# Patient Record
Sex: MALE | Race: WHITE | URBAN - METROPOLITAN AREA
[De-identification: names, ages, dates, MRNs, and addresses within clinical notes are randomized per-mention and may not be internally consistent; named-entity substitution may affect disease eponyms.]

---

## 2018-06-08 PROBLEM — L40.8 INVERSE PSORIASIS: Status: ACTIVE | Noted: 2018-06-08

## 2018-12-03 ENCOUNTER — OFFICE VISIT (OUTPATIENT)
Dept: PRIMARY CARE CLINIC | Age: 68
End: 2018-12-03
Payer: COMMERCIAL

## 2018-12-03 VITALS
HEART RATE: 99 BPM | OXYGEN SATURATION: 96 % | SYSTOLIC BLOOD PRESSURE: 138 MMHG | BODY MASS INDEX: 29.15 KG/M2 | DIASTOLIC BLOOD PRESSURE: 62 MMHG | WEIGHT: 180.6 LBS

## 2018-12-03 DIAGNOSIS — E11.9 TYPE 2 DIABETES MELLITUS WITHOUT COMPLICATION, WITHOUT LONG-TERM CURRENT USE OF INSULIN (HCC): ICD-10-CM

## 2018-12-03 DIAGNOSIS — E11.9 TYPE 2 DIABETES MELLITUS WITHOUT COMPLICATION, WITHOUT LONG-TERM CURRENT USE OF INSULIN (HCC): Primary | ICD-10-CM

## 2018-12-03 DIAGNOSIS — E78.49 OTHER HYPERLIPIDEMIA: ICD-10-CM

## 2018-12-03 DIAGNOSIS — M17.10 ARTHRITIS OF KNEE: Primary | ICD-10-CM

## 2018-12-03 DIAGNOSIS — R19.5 POSITIVE FIT (FECAL IMMUNOCHEMICAL TEST): ICD-10-CM

## 2018-12-03 DIAGNOSIS — I10 ESSENTIAL HYPERTENSION: ICD-10-CM

## 2018-12-03 LAB
CREATININE URINE POCT: 100
HBA1C MFR BLD: 6.3 %
MICROALBUMIN/CREAT 24H UR: 10 MG/G{CREAT}
MICROALBUMIN/CREAT UR-RTO: <30

## 2018-12-03 PROCEDURE — 82044 UR ALBUMIN SEMIQUANTITATIVE: CPT | Performed by: FAMILY MEDICINE

## 2018-12-03 PROCEDURE — 83037 HB GLYCOSYLATED A1C HOME DEV: CPT | Performed by: FAMILY MEDICINE

## 2018-12-03 PROCEDURE — 99214 OFFICE O/P EST MOD 30 MIN: CPT | Performed by: FAMILY MEDICINE

## 2018-12-03 PROCEDURE — 36416 COLLJ CAPILLARY BLOOD SPEC: CPT | Performed by: FAMILY MEDICINE

## 2018-12-03 RX ORDER — METFORMIN HYDROCHLORIDE 500 MG/1
500 TABLET, EXTENDED RELEASE ORAL DAILY
Qty: 90 TABLET | Refills: 2 | Status: SHIPPED | OUTPATIENT
Start: 2018-12-03 | End: 2019-09-24 | Stop reason: SDUPTHER

## 2018-12-03 RX ORDER — NAPROXEN 500 MG/1
500 TABLET ORAL 2 TIMES DAILY PRN
Qty: 180 TABLET | Refills: 2 | Status: SHIPPED | OUTPATIENT
Start: 2018-12-03 | End: 2021-07-12 | Stop reason: SDUPTHER

## 2018-12-03 RX ORDER — SIMVASTATIN 40 MG
TABLET ORAL
Qty: 90 TABLET | Refills: 2 | Status: SHIPPED | OUTPATIENT
Start: 2018-12-03 | End: 2019-10-09 | Stop reason: SDUPTHER

## 2018-12-03 ASSESSMENT — ENCOUNTER SYMPTOMS: RESPIRATORY NEGATIVE: 1

## 2018-12-03 NOTE — PATIENT INSTRUCTIONS
shingles. Shingles vaccine (recombinant)  Recombinant shingles vaccine was approved by FDA in 2017 for the prevention of shingles. In clinical trials, it was more than 90% effective in preventing shingles. It can also reduce the likelihood of PHN. Two doses, 2 to 6 months apart, are recommended for adults 48 and older. This vaccine is also recommended for people who have already gotten the live shingles vaccine (Zostavax). There is no live virus in this vaccine. Some people should not get this vaccine  Tell your vaccine provider if you:  · Have any severe, life-threatening allergies. A person who has ever had a life-threatening allergic reaction after a dose of recombinant shingles vaccine, or has a severe allergy to any component of this vaccine, may be advised not to be vaccinated. Ask your health care provider if you want information about vaccine components. · Are pregnant or breastfeeding. There is not much information about use of recombinant shingles vaccine in pregnant or nursing women. Your healthcare provider might recommend delaying vaccination. · Are not feeling well. If you have a mild illness, such as a cold, you can probably get the vaccine today. If you are moderately or severely ill, you should probably wait until you recover. Your doctor can advise you. Risks of a vaccine reaction  With any medicine, including vaccines, there is a chance of reactions. After recombinant shingles vaccination, a person might experience:  · Pain, redness, soreness, or swelling at the site of the injection  · Headache, muscle aches, fever, shivering, fatigue  In clinical trials, most people got a sore arm with mild or moderate pain after vaccination, and some also had redness and swelling where they got the shot. Some people felt tired, had muscle pain, a headache, shivering, fever, stomach pain, or nausea.  About 1 out of 6 people who got recombinant zoster vaccine experienced side effects that prevented them from doing regular activities. Symptoms went away on their own in about 2 to 3 days. Side effects were more common in younger people. You should still get the second dose of recombinant zoster vaccine even if you had one of these reactions after the first dose. Other things that could happen after this vaccine:  · People sometimes faint after medical procedures, including vaccination. Sitting or lying down for about 15 minutes can help prevent fainting and injuries caused by a fall. Tell your provider if you feel dizzy or have vision changes or ringing in the ears. · Some people get shoulder pain that can be more severe and longer-lasting than routine soreness that can follow injections. This happens very rarely. · Any medication can cause a severe allergic reaction. Such reactions to a vaccine are estimated at about 1 in a million doses, and would happen within a few minutes to a few hours after the vaccination. As with any medicine, there is a very remote chance of a vaccine causing a serious injury or death. The safety of vaccines is always being monitored. For more information, visit: www.cdc.gov/vaccinesafety/  What if there is a serious problem? What should I look for? · Look for anything that concerns you, such as signs of a severe allergic reaction, very high fever, or unusual behavior. Signs of a severe allergic reaction can include hives, swelling of the face and throat, difficulty breathing, a fast heartbeat, dizziness, and weakness. These would usually start a few minutes to a few hours after the vaccination. What should I do? · If you think it is a severe allergic reaction or other emergency that can't wait, call 9-1-1 or get to the nearest hospital. Otherwise, call your health care provider. · Afterward, the reaction should be reported to the Vaccine Adverse Event Reporting System (VAERS).  Your doctor should file this report, or you can do it yourself through the VAERS website at

## 2018-12-03 NOTE — PROGRESS NOTES
[address] [place] [address] PRIMARY CARE  [id number] [facility]  Dept: [phone]  Dept Fax: [phone]    Halley Woodruff is a 80 y.o. male who presents today for his medical conditions/complaintsas noted below. Halley Woodruff is c/o of   Chief Complaint   Patient presents with    Diabetes     Last a1c 7/30/18 6.1, microalbumin 12/13/17, lipid 9/1/18. No vision changes/neuropathy         HPI:     Diabetes   He presents for his follow-up diabetic visit. He has type 2 diabetes mellitus. His disease course has been stable. There are no hypoglycemic associated symptoms. There are no diabetic associated symptoms. There are no hypoglycemic complications. There are no diabetic complications. Risk factors for coronary artery disease include diabetes mellitus and male sex. Current diabetic treatment includes oral agent (monotherapy). He is compliant with treatment all of the time. His weight is stable. He is following a generally healthy diet. He never participates in exercise. There is no change in his home blood glucose trend. His breakfast blood glucose range is generally 130-140 mg/dl. (130-150) An ACE inhibitor/angiotensin II receptor blocker is being taken.        Hemoglobin A1C (%)   Date Value   07/30/2018 6.1   03/29/2018 6.1   12/13/2017 7.0             ( goal A1C is < 7)   No results found for: LABMICR  LDL Calculated (mg/dL)   Date Value   09/01/2018 89   12/05/2017 81   12/03/2016 98       (goal LDL is <100)   No results found for: AST, ALT, BUN  BP Readings from Last 3 Encounters:   12/03/18 138/62   07/30/18 126/62   06/08/18 (!) 150/76          (goal 140/90)    Past Medical History:   Diagnosis Date    H/O hepatitis         Social History   Substance Use Topics    Smoking status: Former Smoker     Packs/day: 0.10     Years: 1.00     Quit date: 1/1/1981    Smokeless tobacco: Never Used    Alcohol use No      Current Outpatient Prescriptions   Medication Sig

## 2019-03-06 DIAGNOSIS — E11.9 TYPE 2 DIABETES MELLITUS WITHOUT COMPLICATION (HCC): ICD-10-CM

## 2019-03-08 RX ORDER — GLUCOSAM/CHON-MSM1/C/MANG/BOSW 500-416.6
TABLET ORAL
Qty: 100 EACH | Refills: 3 | Status: SHIPPED | OUTPATIENT
Start: 2019-03-08 | End: 2020-04-06

## 2019-04-03 ENCOUNTER — OFFICE VISIT (OUTPATIENT)
Dept: PRIMARY CARE CLINIC | Age: 69
End: 2019-04-03
Payer: COMMERCIAL

## 2019-04-03 VITALS
DIASTOLIC BLOOD PRESSURE: 80 MMHG | WEIGHT: 182.2 LBS | HEART RATE: 94 BPM | SYSTOLIC BLOOD PRESSURE: 160 MMHG | BODY MASS INDEX: 29.28 KG/M2 | HEIGHT: 66 IN | OXYGEN SATURATION: 96 %

## 2019-04-03 DIAGNOSIS — E11.9 TYPE 2 DIABETES MELLITUS WITHOUT COMPLICATION, WITHOUT LONG-TERM CURRENT USE OF INSULIN (HCC): ICD-10-CM

## 2019-04-03 DIAGNOSIS — E78.49 OTHER HYPERLIPIDEMIA: ICD-10-CM

## 2019-04-03 DIAGNOSIS — I10 ESSENTIAL HYPERTENSION: ICD-10-CM

## 2019-04-03 LAB — HBA1C MFR BLD: 6.9 %

## 2019-04-03 PROCEDURE — 99214 OFFICE O/P EST MOD 30 MIN: CPT | Performed by: FAMILY MEDICINE

## 2019-04-03 PROCEDURE — 83036 HEMOGLOBIN GLYCOSYLATED A1C: CPT | Performed by: FAMILY MEDICINE

## 2019-04-03 RX ORDER — BENAZEPRIL HYDROCHLORIDE 20 MG/1
20 TABLET ORAL DAILY
Qty: 30 TABLET | Refills: 3 | Status: SHIPPED | OUTPATIENT
Start: 2019-04-03 | End: 2019-07-03 | Stop reason: SDUPTHER

## 2019-04-03 ASSESSMENT — ENCOUNTER SYMPTOMS: RESPIRATORY NEGATIVE: 1

## 2019-04-03 NOTE — PROGRESS NOTES
Outpatient Medications   Medication Sig Dispense Refill    benazepril (LOTENSIN) 20 MG tablet Take 1 tablet by mouth daily 30 tablet 3    TRUEPLUS LANCETS 30G MISC USE AS DIRECTED TO  TEST  DAILY 100 each 3    metFORMIN (GLUCOPHAGE-XR) 500 MG extended release tablet Take 1 tablet by mouth daily 90 tablet 2    simvastatin (ZOCOR) 40 MG tablet TAKE ONE TABLET BY MOUTH NIGHTLY 90 tablet 2    naproxen (NAPROSYN) 500 MG tablet Take 1 tablet by mouth 2 times daily as needed for Pain 180 tablet 2    TRUE METRIX BLOOD GLUCOSE TEST strip USE  STRIP TO CHECK GLUCOSE ONCE DAILY 50 strip 5    amLODIPine-benazepril (LOTREL) 10-20 MG per capsule TAKE ONE CAPSULE BY MOUTH ONCE DAILY 90 capsule 3    Multiple Vitamin (MULTI VITAMIN DAILY) TABS Take 1 tablet by mouth daily       No current facility-administered medications for this visit. Allergies   Allergen Reactions    Cialis [Tadalafil]      Leg pain/cramps    Ranitidine      Leg spasms       Health Maintenance   Topic Date Due    AAA screen  1950    DTaP/Tdap/Td vaccine (1 - Tdap) 01/01/1970    Shingles Vaccine (1 of 2) 01/01/2001    Diabetic retinal exam  04/05/2018    Diabetic foot exam  03/29/2019    Colon Cancer Screen FIT/FOBT  08/15/2019    Lipid screen  09/01/2019    Potassium monitoring  09/01/2019    Creatinine monitoring  09/01/2019    A1C test (Diabetic or Prediabetic)  12/03/2019    Flu vaccine  Completed    Pneumococcal 65+ years Vaccine  Completed    Hepatitis C screen  Completed       Subjective:     Review of Systems   Respiratory: Negative. Cardiovascular: Negative. Gastrointestinal:        Uses metamucil fiber in am for bowels   works part time. Had colonoscopy.      Objective:     BP (!) 160/80 (Site: Left Upper Arm, Position: Sitting, Cuff Size: Medium Adult)   Pulse 94   Ht 5' 5.5\" (1.664 m)   Wt 182 lb 3.2 oz (82.6 kg)   SpO2 96%   BMI 29.86 kg/m²   Physical Exam   Constitutional: He is oriented to person,

## 2019-04-03 NOTE — PATIENT INSTRUCTIONS
Patient Education        Tdap (Tetanus, Diphtheria, Pertussis) Vaccine: What You Need to Know  Why get vaccinated? Tetanus, diphtheria, and pertussis are very serious diseases. Tdap vaccine can protect us from these diseases. And Tdap vaccine given to pregnant women can protect  babies against pertussis. Tetanus (lockjaw) is rare in the [facility] today. It causes painful muscle tightening and stiffness, usually all over the body. · It can lead to tightening of muscles in the head and neck so you can't open your mouth, swallow, or sometimes even breathe. Tetanus kills about 1 out of 10 people who are infected even after receiving the best medical care. Diphtheria is also rare in the [place] today. It can cause a thick coating to form in the back of the throat. · It can lead to breathing problems, heart failure, paralysis, and death. Pertussis (whooping cough) causes severe coughing spells, which can cause difficulty breathing, vomiting, and disturbed sleep. · It can also lead to weight loss, incontinence, and rib fractures. Up to 2 in 100 adolescents and 5 in 100 adults with pertussis are hospitalized or have complications, which could include pneumonia or death. These diseases are caused by bacteria. Diphtheria and pertussis are spread from person to person through secretions from coughing or sneezing. Tetanus enters the body through cuts, scratches, or wounds. Before vaccines, as many as 200,000 cases of diphtheria, 200,000 cases of pertussis, and hundreds of cases of tetanus were reported in the [place] each year. Since vaccination began, reports of cases for tetanus and diphtheria have dropped by about 99% and for pertussis by about 80%. Tdap vaccine  The Tdap vaccine can protect adolescents and adults from tetanus, diphtheria, and pertussis. One dose of Tdap is routinely given at age 6 or 15. People who did not get Tdap at that age should get it as soon as possible.   Tdap is especially important for health care professionals and anyone having close contact with a baby younger than 12 months. Pregnant women should get a dose of Tdap during every pregnancy, to protect the  from pertussis. Infants are most at risk for severe, life-threatening complications from pertussis. Another vaccine, called Td, protects against tetanus and diphtheria, but not pertussis. A Td booster should be given every 10 years. Tdap may be given as one of these boosters if you have never gotten Tdap before. Tdap may also be given after a severe cut or burn to prevent tetanus infection. Your doctor or the person giving you the vaccine can give you more information. Tdap may safely be given at the same time as other vaccines. Some people should not get this vaccine  · A person who has ever had a life-threatening allergic reaction after a previous dose of any diphtheria-, tetanus-, or pertussis-containing vaccine, OR has a severe allergy to any part of this vaccine, should not get Tdap vaccine. Tell the person giving the vaccine about any severe allergies. · Anyone who had coma or long repeated seizures within 7 days after a childhood dose of DTP or DTaP, or a previous dose of Tdap, should not get Tdap, unless a cause other than the vaccine was found. They can still get Td. · Talk to your doctor if you:  ? Have seizures or another nervous system problem. ? Had severe pain or swelling after any vaccine containing diphtheria, tetanus, or pertussis. ? Ever had a condition called Guillain-Barré Syndrome (GBS). ? Aren't feeling well on the day the shot is scheduled. Risks  With any medicine, including vaccines, there is a chance of side effects. These are usually mild and go away on their own. Serious reactions are also possible but are rare. Most people who get Tdap vaccine do not have any problems with it.   Mild problems following Tdap  (Did not interfere with activities)  · Pain where the shot was given (about 3 in 4 adolescents or 2 in 3 adults)  · Redness or swelling where the shot was given (about 1 person in 5)  · Mild fever of at least 100.4°F (up to about 1 in 25 adolescents or 1 in 100 adults)  · Headache (about 3 or 4 people in 10)  · Tiredness (about 1 person in 3 or 4)  · Nausea, vomiting, diarrhea, stomachache (up to 1 in 4 adolescents or 1 in 10 adults)  · Chills, sore joints (about 1 person in 10)  · Body aches (about 1 person in 3 or 4)  · Rash, swollen glands (uncommon)  Moderate problems following Tdap  (Interfered with activities, but did not require medical attention)  · Pain where the shot was given (up to 1 in 5 or 6)  · Redness or swelling where the shot was given (up to about 1 in 16 adolescents or 1 in 12 adults)  · Fever over 102°F (about 1 in 100 adolescents or 1 in 250 adults)  · Headache (about 1 in 7 adolescents or 1 in 10 adults)  · Nausea, vomiting, diarrhea, stomachache (up to 1 to 3 people in 100)  · Swelling of the entire arm where the shot was given (up to about 1 in 500)  Severe problems following Tdap  (Unable to perform usual activities; required medical attention)  · Swelling, severe pain, bleeding and redness in the arm where the shot was given (rare)  Problems that could happen after any vaccine:  · People sometimes faint after a medical procedure, including vaccination. Sitting or lying down for about 15 minutes can help prevent fainting, and injuries caused by a fall. Tell your doctor if you feel dizzy or have vision changes or ringing in the ears. · Some people get severe pain in the shoulder and have difficulty moving the arm where a shot was given. This happens very rarely. · Any medication can cause a severe allergic reaction. Such reactions from a vaccine are very rare, estimated at fewer than 1 in a million doses, and would happen within a few minutes to a few hours after the vaccination.   As with any medicine, there is a very remote chance of a vaccine Human Services  Centers for Disease Control and Prevention  Many Vaccine Information Statements are available in [place] and other languages. See www.immunize.org/vis. Muchas hojas de información sobre vacunas están disponibles en español y en otros idiomas. Visite www.immunize.org/vis. Care instructions adapted under license by [facility] ([facility]). If you have questions about a medical condition or this instruction, always ask your healthcare professional. [place] [address] any warranty or liability for your use of this information. Patient Education        Recombinant Zoster (Shingles) Vaccine, RZV: What you need to know  Why get vaccinated? Shingles (also called herpes zoster, or just zoster) is a painful skin rash, often with blisters. Shingles is caused by the varicella zoster virus, the same virus that causes chickenpox. After you have chickenpox, the virus stays in your body and can cause shingles later in life. You can't catch shingles from another person. However, a person who has never had chickenpox (or chickenpox vaccine) could get chickenpox from someone with shingles. A shingles rash usually appears on one side of the face or body and heals within 2 to 4 weeks. Its main symptom is pain, which can be severe. Other symptoms can include fever, headache, chills, and upset stomach. Very rarely, a shingles infection can lead to pneumonia, hearing problems, blindness, brain inflammation (encephalitis), or death. For about 1 person in 5, severe pain can continue even long after the rash has cleared up. This long-lasting pain is called post-herpetic neuralgia (PHN). Shingles is far more common in people 48years of age and older than in younger people, and the risk increases with age. It is also more common in people whose immune system is weakened because of a disease such as cancer, or by drugs such as steroids or chemotherapy.  At least 1 million people a year in the [place] get shingles. Shingles vaccine (recombinant)  Recombinant shingles vaccine was approved by FDA in 2017 for the prevention of shingles. In clinical trials, it was more than 90% effective in preventing shingles. It can also reduce the likelihood of PHN. Two doses, 2 to 6 months apart, are recommended for adults 48 and older. This vaccine is also recommended for people who have already gotten the live shingles vaccine (Zostavax). There is no live virus in this vaccine. Some people should not get this vaccine  Tell your vaccine provider if you:  · Have any severe, life-threatening allergies. A person who has ever had a life-threatening allergic reaction after a dose of recombinant shingles vaccine, or has a severe allergy to any component of this vaccine, may be advised not to be vaccinated. Ask your health care provider if you want information about vaccine components. · Are pregnant or breastfeeding. There is not much information about use of recombinant shingles vaccine in pregnant or nursing women. Your healthcare provider might recommend delaying vaccination. · Are not feeling well. If you have a mild illness, such as a cold, you can probably get the vaccine today. If you are moderately or severely ill, you should probably wait until you recover. Your doctor can advise you. Risks of a vaccine reaction  With any medicine, including vaccines, there is a chance of reactions. After recombinant shingles vaccination, a person might experience:  · Pain, redness, soreness, or swelling at the site of the injection  · Headache, muscle aches, fever, shivering, fatigue  In clinical trials, most people got a sore arm with mild or moderate pain after vaccination, and some also had redness and swelling where they got the shot. Some people felt tired, had muscle pain, a headache, shivering, fever, stomach pain, or nausea.  About 1 out of 6 people who got recombinant zoster vaccine experienced side effects that prevented them from doing regular activities. Symptoms went away on their own in about 2 to 3 days. Side effects were more common in younger people. You should still get the second dose of recombinant zoster vaccine even if you had one of these reactions after the first dose. Other things that could happen after this vaccine:  · People sometimes faint after medical procedures, including vaccination. Sitting or lying down for about 15 minutes can help prevent fainting and injuries caused by a fall. Tell your provider if you feel dizzy or have vision changes or ringing in the ears. · Some people get shoulder pain that can be more severe and longer-lasting than routine soreness that can follow injections. This happens very rarely. · Any medication can cause a severe allergic reaction. Such reactions to a vaccine are estimated at about 1 in a million doses, and would happen within a few minutes to a few hours after the vaccination. As with any medicine, there is a very remote chance of a vaccine causing a serious injury or death. The safety of vaccines is always being monitored. For more information, visit: www.cdc.gov/vaccinesafety/  What if there is a serious problem? What should I look for? · Look for anything that concerns you, such as signs of a severe allergic reaction, very high fever, or unusual behavior. Signs of a severe allergic reaction can include hives, swelling of the face and throat, difficulty breathing, a fast heartbeat, dizziness, and weakness. These would usually start a few minutes to a few hours after the vaccination. What should I do? · If you think it is a severe allergic reaction or other emergency that can't wait, call 9-1-1 or get to the nearest hospital. Otherwise, call your health care provider. · Afterward, the reaction should be reported to the Vaccine Adverse Event Reporting System (VAERS).  Your doctor should file this report, or you can do it yourself through the VAERS website at www.[employer]. [facility].gov, or by calling 6-453.747.6271. [employer] does not give medical advice. .  How can I learn more? · Ask your health care provider. He or she can give you the vaccine package insert or suggest other sources of information. · Call your local or state health department. · Contact the Centers for Disease Control and Prevention (CDC):  ? Call 2-976.324.5814 (1-800-CDC-INFO) or  ? Visit CDC's website at www.cdc.gov/vaccines  Vaccine Information Statement (Interim)  Recombinant Zoster Vaccine  2/12/2018  [facility] for Disease Control and Prevention  Many Vaccine Information Statements are available in French and other languages. See www.immunize.org/vis. Hojas de Información Sobre Vacunas están disponibles en Español y en muchos otros idiomas. Visite Mad[place].si   Care instructions adapted under license by [facility] ([facility]). If you have questions about a medical condition or this instruction, always ask your healthcare professional. [address] any warranty or liability for your use of this information.

## 2019-05-09 ENCOUNTER — TELEPHONE (OUTPATIENT)
Dept: PRIMARY CARE CLINIC | Age: 69
End: 2019-05-09

## 2019-06-05 DIAGNOSIS — E11.9 TYPE 2 DIABETES MELLITUS WITHOUT COMPLICATION (HCC): ICD-10-CM

## 2019-06-05 RX ORDER — CALCIUM CITRATE/VITAMIN D3 200MG-6.25
TABLET ORAL
Qty: 50 STRIP | Refills: 5 | Status: SHIPPED | OUTPATIENT
Start: 2019-06-05 | End: 2020-03-06

## 2019-06-05 RX ORDER — NAPROXEN 500 MG/1
TABLET ORAL
Qty: 180 TABLET | Refills: 2 | Status: SHIPPED | OUTPATIENT
Start: 2019-06-05 | End: 2019-07-03

## 2019-06-05 RX ORDER — AMLODIPINE BESYLATE AND BENAZEPRIL HYDROCHLORIDE 10; 20 MG/1; MG/1
CAPSULE ORAL
Qty: 90 CAPSULE | Refills: 3 | Status: SHIPPED
Start: 2019-06-05 | End: 2020-03-09 | Stop reason: SDUPTHER

## 2019-07-03 ENCOUNTER — OFFICE VISIT (OUTPATIENT)
Dept: PRIMARY CARE CLINIC | Age: 69
End: 2019-07-03
Payer: COMMERCIAL

## 2019-07-03 VITALS
BODY MASS INDEX: 29.99 KG/M2 | WEIGHT: 183 LBS | HEART RATE: 76 BPM | DIASTOLIC BLOOD PRESSURE: 60 MMHG | OXYGEN SATURATION: 97 % | SYSTOLIC BLOOD PRESSURE: 134 MMHG

## 2019-07-03 DIAGNOSIS — E11.9 TYPE 2 DIABETES MELLITUS WITHOUT COMPLICATION, WITHOUT LONG-TERM CURRENT USE OF INSULIN (HCC): Primary | ICD-10-CM

## 2019-07-03 DIAGNOSIS — I10 ESSENTIAL HYPERTENSION: ICD-10-CM

## 2019-07-03 LAB — HBA1C MFR BLD: 7 %

## 2019-07-03 PROCEDURE — 99214 OFFICE O/P EST MOD 30 MIN: CPT | Performed by: FAMILY MEDICINE

## 2019-07-03 PROCEDURE — 83036 HEMOGLOBIN GLYCOSYLATED A1C: CPT | Performed by: FAMILY MEDICINE

## 2019-07-03 RX ORDER — BENAZEPRIL HYDROCHLORIDE 20 MG/1
20 TABLET ORAL DAILY
Qty: 30 TABLET | Refills: 5 | Status: SHIPPED | OUTPATIENT
Start: 2019-07-03 | End: 2020-01-31

## 2019-07-03 ASSESSMENT — PATIENT HEALTH QUESTIONNAIRE - PHQ9
SUM OF ALL RESPONSES TO PHQ QUESTIONS 1-9: 0
2. FEELING DOWN, DEPRESSED OR HOPELESS: 0
SUM OF ALL RESPONSES TO PHQ QUESTIONS 1-9: 0
1. LITTLE INTEREST OR PLEASURE IN DOING THINGS: 0
SUM OF ALL RESPONSES TO PHQ9 QUESTIONS 1 & 2: 0

## 2019-07-03 ASSESSMENT — ENCOUNTER SYMPTOMS: RESPIRATORY NEGATIVE: 1

## 2019-07-03 NOTE — PATIENT INSTRUCTIONS
(lockjaw) causes painful tightening of the muscles, usually all over the body. It can lead to \"locking\" of the jaw so the victim cannot open the mouth or swallow. Tetanus leads to death in about 1 out of 10 cases. Diphtheria causes a thick coating in the nose, throat, and airways. It can lead to breathing problems, paralysis, heart failure, or death. Pertussis (whooping cough) causes coughing so severe that it interferes with eating, drinking, or breathing. These spells can last for weeks and can lead to pneumonia, seizures (convulsions), brain damage, and death. Diphtheria and pertussis are spread from person to person. Tetanus enters the body through a cut or wound. The diphtheria, tetanus acellular, and pertussis adult vaccine (also called Tdap) is used to help prevent these diseases in people who are at least 8years old. Most people in this age group require only one Tdap shot for protection against these diseases. Tdap vaccine is especially important for healthcare workers or people who have close contact with a baby younger than 13 months old. This vaccine works by exposing you to a small dose of the bacteria or a protein from the bacteria, which causes the body to develop immunity to the disease. This vaccine will not treat an active infection that has already developed in the body. Like any vaccine, the Tdap vaccine may not provide protection from disease in every person. What should I discuss with my healthcare provider before receiving this vaccine? You should not receive this vaccine if:  · you had a life-threatening allergic reaction to any vaccine that contains tetanus, diphtheria, or pertussis; or  · you had a neurologic disorder affecting your brain (such as loss of consciousness or a prolonged seizure) within 7 days after having a previous pertussis vaccine.   You may not be able to receive a Tdap vaccine if you have ever received a similar vaccine that caused any of the following:  · a is usually given as a one-time injection. Unless your doctor's tells you otherwise, you will not need a booster vaccine. Tdap vaccine is usually given once every 10 years. What happens if I miss a dose? Since the Tdap vaccine is usually given only once, you are not likely to miss a dose. What happens if I overdose? An overdose of this vaccine is unlikely to occur. What should I avoid before or after receiving this vaccine? Follow your doctor's instructions about any restrictions on food, beverages, or activity after receiving a Tdap vaccine. What are the possible side effects of this vaccine? Keep track of any and all side effects you have after receiving this vaccine. If you ever need to receive a booster dose, you will need to tell your doctor if the previous shot caused any side effects. You should not receive a booster vaccine if you had a life threatening allergic reaction after the first shot. Becoming infected with diphtheria, pertussis, or tetanus is much more dangerous to your health than receiving this vaccine. However, like any medicine, this vaccine can cause side effects but the risk of serious side effects is extremely low. Get emergency medical help if you have signs of an allergic reaction: hives; difficult breathing; swelling of your face, lips, tongue, or throat. Call your doctor at once if you have any of these side effects within 7 days after receiving Tdap vaccine:  · numbness, weakness, or tingling in your feet and legs;  · problems with walking or coordination;  · sudden pain in your arms or shoulders;  · a light-headed feeling, like you might pass out;  · vision problems, ringing in your ears;  · seizure (black-out or convulsions); or  · redness, swelling, bleeding, or severe pain where the shot was given. Common side effects may include:  · mild pain or tenderness where the shot was given;  · headache or tiredness;  · body aches; or  · mild nausea, diarrhea, or vomiting.   This is not a complete list of side effects and others may occur. Call your doctor for medical advice about side effects. You may report vaccine side effects to the [address] [address] and Human Services at [phone]. What other drugs will affect tetanus, diphtheria, acellular pertussis vaccine? Before receiving this vaccine, tell your doctor about all other vaccines you have recently received. Also tell the doctor if you have recently received drugs or treatments that can weaken the immune system, including:  · an oral, nasal, inhaled, or injectable steroid medicine;  · medications to treat psoriasis, rheumatoid arthritis, or other autoimmune disorders; or  · medicines to treat or prevent organ transplant rejection. If you are using any of these medications, you may not be able to receive the vaccine, or may need to wait until the other treatments are finished. This list is not complete. Other drugs may interact with this vaccine, including prescription and over-the-counter medicines, vitamins, and herbal products. Not all possible interactions are listed in this medication guide. Where can I get more information? Your doctor or pharmacist can provide more information about this vaccine. Additional information is available from your local health department or the Centers for Disease Control and Prevention. Remember, keep this and all other medicines out of the reach of children, never share your medicines with others, and use this medication only for the indication prescribed. Every effort has been made to ensure that the information provided by [address] is accurate, up-to-date, and complete, but no guarantee is made to that effect. Drug information contained herein may be time sensitive.  [facility] information has been compiled for use by healthcare practitioners and consumers in the [place] and therefore Christine does not warrant that uses outside of the [place] are appropriate, unless specifically indicated otherwise. [facility] drug information does not endorse drugs, diagnose patients or recommend therapy. [facility] drug information is an informational resource designed to assist licensed healthcare practitioners in caring for their patients and/or to serve consumers viewing this service as a supplement to, and not a substitute for, the expertise, skill, knowledge and judgment of healthcare practitioners. The absence of a warning for a given drug or drug combination in no way should be construed to indicate that the drug or drug combination is safe, effective or appropriate for any given patient. [facility] does not assume any responsibility for any aspect of healthcare administered with the aid of information [facility] provides. The information contained herein is not intended to cover all possible uses, directions, precautions, warnings, drug interactions, allergic reactions, or adverse effects. If you have questions about the drugs you are taking, check with your doctor, nurse or pharmacist.  Copyright 5380-2419 [address]. Version: 3.01. Revision date: 8/19/2016. Care instructions adapted under license by [facility] ([facility]). If you have questions about a medical condition or this instruction, always ask your healthcare professional. [place] [address] any warranty or liability for your use of this information. Patient Education        Recombinant Zoster (Shingles) Vaccine, RZV: What you need to know  Why get vaccinated? Shingles (also called herpes zoster, or just zoster) is a painful skin rash, often with blisters. Shingles is caused by the varicella zoster virus, the same virus that causes chickenpox. After you have chickenpox, the virus stays in your body and can cause shingles later in life. You can't catch shingles from another person.  However, a person who has never had chickenpox (or chickenpox vaccine) could get chickenpox from someone with have a mild illness, such as a cold, you can probably get the vaccine today. If you are moderately or severely ill, you should probably wait until you recover. Your doctor can advise you. Risks of a vaccine reaction  With any medicine, including vaccines, there is a chance of reactions. After recombinant shingles vaccination, a person might experience:  · Pain, redness, soreness, or swelling at the site of the injection  · Headache, muscle aches, fever, shivering, fatigue  In clinical trials, most people got a sore arm with mild or moderate pain after vaccination, and some also had redness and swelling where they got the shot. Some people felt tired, had muscle pain, a headache, shivering, fever, stomach pain, or nausea. About 1 out of 6 people who got recombinant zoster vaccine experienced side effects that prevented them from doing regular activities. Symptoms went away on their own in about 2 to 3 days. Side effects were more common in younger people. You should still get the second dose of recombinant zoster vaccine even if you had one of these reactions after the first dose. Other things that could happen after this vaccine:  · People sometimes faint after medical procedures, including vaccination. Sitting or lying down for about 15 minutes can help prevent fainting and injuries caused by a fall. Tell your provider if you feel dizzy or have vision changes or ringing in the ears. · Some people get shoulder pain that can be more severe and longer-lasting than routine soreness that can follow injections. This happens very rarely. · Any medication can cause a severe allergic reaction. Such reactions to a vaccine are estimated at about 1 in a million doses, and would happen within a few minutes to a few hours after the vaccination. As with any medicine, there is a very remote chance of a vaccine causing a serious injury or death. The safety of vaccines is always being monitored.  For more information, visit: www.cdc.gov/vaccinesafety/  What if there is a serious problem? What should I look for? · Look for anything that concerns you, such as signs of a severe allergic reaction, very high fever, or unusual behavior. Signs of a severe allergic reaction can include hives, swelling of the face and throat, difficulty breathing, a fast heartbeat, dizziness, and weakness. These would usually start a few minutes to a few hours after the vaccination. What should I do? · If you think it is a severe allergic reaction or other emergency that can't wait, call 9-1-1 or get to the nearest hospital. Otherwise, call your health care provider. · Afterward, the reaction should be reported to the Vaccine Adverse Event Reporting System (VAERS). Your doctor should file this report, or you can do it yourself through the VAERS website at www.vaers. [facility].gov, or by calling 9-778.995.6238. VAERS does not give medical advice. .  How can I learn more? · Ask your health care provider. He or she can give you the vaccine package insert or suggest other sources of information. · Call your local or state health department. · Contact the Centers for Disease Control and Prevention (CDC):  ? Call 9-295.697.6121 (0-437-XWO-INFO) or  ? Visit CDC's website at www.cdc.gov/vaccines  Vaccine Information Statement (Interim)  Recombinant Zoster Vaccine  2/12/2018  [facility] for Disease Control and Prevention  Many Vaccine Information Statements are available in [place] and other languages. See www.immunize.org/vis. Hojas de Información Sobre Vacunas están disponibles en Español y en muchos otros idiomas. Visite Madelin.si   Care instructions adapted under license by [facility] ([facility]). If you have questions about a medical condition or this instruction, always ask your healthcare professional. [place] [address] any warranty or liability for your use of this information.

## 2019-07-03 NOTE — PROGRESS NOTES
[address] [place] [address] PRIMARY CARE  [id number] [facility]  Dept: [phone]  Dept Fax: [phone]    Candis Jones is a 77 y.o. male who presents today for his medical conditions/complaintsas noted below. Candis Jones is c/o of   Chief Complaint   Patient presents with    Diabetes    Hypertension         HPI:     Diabetes   He presents for his follow-up diabetic visit. He has type 2 diabetes mellitus. His disease course has been stable. Pertinent negatives for hypoglycemia include no dizziness. Pertinent negatives for diabetes include no chest pain. There are no hypoglycemic complications. There are no diabetic complications. Risk factors for coronary artery disease include diabetes mellitus, male sex and hypertension. Current diabetic treatment includes oral agent (monotherapy). He is compliant with treatment all of the time. His weight is stable. He is following a generally healthy diet. He participates in exercise intermittently. There is no change in his home blood glucose trend. His breakfast blood glucose range is generally 110-130 mg/dl. An ACE inhibitor/angiotensin II receptor blocker is being taken. Eye exam is not current.        Hemoglobin A1C (%)   Date Value   2019 7.0   2019 6.9   2018 6.3         ( goal A1C is < 7)   No results found for: LABMICR  LDL Calculated (mg/dL)   Date Value   2018 89   2017 81   2016 98       (goal LDL is <100)   No results found for: AST, ALT, BUN  BP Readings from Last 3 Encounters:   19 134/60   19 (!) 160/80   18 138/62          (snay709/[id number])    Past Medical History:   Diagnosis Date    H/O hepatitis         Social History     Tobacco Use    Smoking status: Former Smoker     Packs/day: 0.10     Years: 1.00     Pack years: 0.10     Last attempt to quit: 1981     Years since quittin.5    Smokeless tobacco: Never Used   Substance Use Topics    Alcohol Procedures    Comprehensive Metabolic Panel, Fasting     Standing Status:   Future     Standing Expiration Date:   7/3/2020    Lipid Panel     Standing Status:   Future     Standing Expiration Date:   7/3/2020     Order Specific Question:   Is Patient Fasting?/# of Hours     Answer:   yes    POCT glycosylated hemoglobin (Hb A1C)    IN COLLECTION CAPILLARY BLOOD SPECIMEN     No orders of the defined types were placed in this encounter. Patient given educational materials - see patient instructions. Do diabetic foot checks at home. Discussed use, benefit, and side effects of prescribed medications. All patient questions answered. Pt voiced understanding. Reviewed health maintenance. Instructed to continue current medications, diet and exercise. Patient agreed with treatment plan. Follow up as directed.        Electronically signed by Ronni Gibson MD on 7/3/2019 at 11:39 AM

## 2019-07-16 LAB
ALBUMIN SERPL-MCNC: NORMAL G/DL
ALP BLD-CCNC: NORMAL U/L
ALT SERPL-CCNC: NORMAL U/L
AST SERPL-CCNC: NORMAL U/L
BILIRUB SERPL-MCNC: NORMAL MG/DL (ref 0.1–1.4)
BUN BLDV-MCNC: NORMAL MG/DL
CALCIUM SERPL-MCNC: NORMAL MG/DL
CHLORIDE BLD-SCNC: NORMAL MMOL/L
CHOLESTEROL, TOTAL: 149 MG/DL
CHOLESTEROL/HDL RATIO: 3.7
CO2: NORMAL MMOL/L
CREAT SERPL-MCNC: NORMAL MG/DL
GLUCOSE FASTING: 122 MG/DL
HDLC SERPL-MCNC: 40 MG/DL (ref 35–70)
LDL CHOLESTEROL CALCULATED: 83 MG/DL (ref 0–160)
POTASSIUM SERPL-SCNC: NORMAL MMOL/L
SODIUM BLD-SCNC: NORMAL MMOL/L
TOTAL PROTEIN: NORMAL G/DL (ref 6.4–8.2)
TRIGL SERPL-MCNC: 131 MG/DL
VLDLC SERPL CALC-MCNC: 26 MG/DL

## 2019-07-18 DIAGNOSIS — E11.9 TYPE 2 DIABETES MELLITUS WITHOUT COMPLICATION, WITHOUT LONG-TERM CURRENT USE OF INSULIN (HCC): ICD-10-CM

## 2019-07-18 DIAGNOSIS — I10 ESSENTIAL HYPERTENSION: ICD-10-CM

## 2019-09-06 ENCOUNTER — TELEPHONE (OUTPATIENT)
Dept: PRIMARY CARE CLINIC | Age: 69
End: 2019-09-06

## 2019-09-12 ENCOUNTER — OFFICE VISIT (OUTPATIENT)
Dept: PRIMARY CARE CLINIC | Age: 69
End: 2019-09-12
Payer: COMMERCIAL

## 2019-09-12 VITALS
DIASTOLIC BLOOD PRESSURE: 58 MMHG | HEART RATE: 94 BPM | SYSTOLIC BLOOD PRESSURE: 136 MMHG | OXYGEN SATURATION: 98 % | WEIGHT: 175.8 LBS | BODY MASS INDEX: 28.81 KG/M2

## 2019-09-12 DIAGNOSIS — R39.12 WEAK URINARY STREAM: Primary | ICD-10-CM

## 2019-09-12 DIAGNOSIS — R35.0 URINARY FREQUENCY: ICD-10-CM

## 2019-09-12 DIAGNOSIS — Z12.5 SCREENING FOR PROSTATE CANCER: ICD-10-CM

## 2019-09-12 DIAGNOSIS — R10.9 LEFT SIDED ABDOMINAL PAIN: ICD-10-CM

## 2019-09-12 PROCEDURE — 99214 OFFICE O/P EST MOD 30 MIN: CPT | Performed by: PHYSICIAN ASSISTANT

## 2019-09-12 RX ORDER — TAMSULOSIN HYDROCHLORIDE 0.4 MG/1
0.4 CAPSULE ORAL DAILY
Qty: 30 CAPSULE | Refills: 11 | Status: CANCELLED | OUTPATIENT
Start: 2019-09-12

## 2019-09-12 RX ORDER — TAMSULOSIN HYDROCHLORIDE 0.4 MG/1
CAPSULE ORAL
Refills: 0 | COMMUNITY
Start: 2019-09-06 | End: 2019-10-07 | Stop reason: SDUPTHER

## 2019-09-12 NOTE — PROGRESS NOTES
Patient given educationalmaterials - see patient instructions. Discussed use, benefit, and side effectsof prescribed medications. All patient questions answered. Pt voiced understanding. Reviewed health maintenance. Instructed to continue current medications, diet andexercise. Patient agreed with treatment plan. Follow up as directed.      Electronicallysigned by Radha Ingarm PA-C on 9/13/2019 at 1:07 AM

## 2019-09-13 ASSESSMENT — ENCOUNTER SYMPTOMS
NAUSEA: 0
CONSTIPATION: 0
DIARRHEA: 0
ABDOMINAL PAIN: 1
VOMITING: 0
BLOOD IN STOOL: 0

## 2019-09-23 DIAGNOSIS — R39.12 WEAK URINARY STREAM: ICD-10-CM

## 2019-09-23 DIAGNOSIS — Z12.5 SCREENING FOR PROSTATE CANCER: ICD-10-CM

## 2019-09-24 DIAGNOSIS — E11.9 TYPE 2 DIABETES MELLITUS WITHOUT COMPLICATION, WITHOUT LONG-TERM CURRENT USE OF INSULIN (HCC): ICD-10-CM

## 2019-09-24 RX ORDER — METFORMIN HYDROCHLORIDE 500 MG/1
TABLET, EXTENDED RELEASE ORAL
Qty: 90 TABLET | Refills: 2 | Status: SHIPPED | OUTPATIENT
Start: 2019-09-24 | End: 2020-06-19

## 2019-10-07 ENCOUNTER — OFFICE VISIT (OUTPATIENT)
Dept: UROLOGY | Age: 69
End: 2019-10-07
Payer: COMMERCIAL

## 2019-10-07 VITALS
DIASTOLIC BLOOD PRESSURE: 72 MMHG | HEIGHT: 66 IN | WEIGHT: 175.71 LBS | HEART RATE: 99 BPM | SYSTOLIC BLOOD PRESSURE: 138 MMHG | TEMPERATURE: 98.1 F | BODY MASS INDEX: 28.24 KG/M2

## 2019-10-07 DIAGNOSIS — N40.1 HYPERTROPHY OF PROSTATE WITH URINARY OBSTRUCTION: Primary | ICD-10-CM

## 2019-10-07 DIAGNOSIS — R35.1 NOCTURIA: ICD-10-CM

## 2019-10-07 DIAGNOSIS — N13.8 HYPERTROPHY OF PROSTATE WITH URINARY OBSTRUCTION: Primary | ICD-10-CM

## 2019-10-07 DIAGNOSIS — R39.12 WEAK URINARY STREAM: ICD-10-CM

## 2019-10-07 DIAGNOSIS — R35.0 FREQUENCY OF URINATION: ICD-10-CM

## 2019-10-07 PROCEDURE — 99204 OFFICE O/P NEW MOD 45 MIN: CPT | Performed by: UROLOGY

## 2019-10-07 RX ORDER — TAMSULOSIN HYDROCHLORIDE 0.4 MG/1
0.4 CAPSULE ORAL DAILY
Qty: 30 CAPSULE | Refills: 5 | Status: SHIPPED | OUTPATIENT
Start: 2019-10-07 | End: 2020-04-14

## 2019-10-07 ASSESSMENT — ENCOUNTER SYMPTOMS
SHORTNESS OF BREATH: 0
EYE REDNESS: 0
BACK PAIN: 1
ABDOMINAL PAIN: 0
EYE PAIN: 0
VOMITING: 0
COUGH: 0
CONSTIPATION: 0
WHEEZING: 0
NAUSEA: 0
DIARRHEA: 0

## 2019-10-09 DIAGNOSIS — E11.9 TYPE 2 DIABETES MELLITUS WITHOUT COMPLICATION, WITHOUT LONG-TERM CURRENT USE OF INSULIN (HCC): ICD-10-CM

## 2019-10-09 DIAGNOSIS — E78.49 OTHER HYPERLIPIDEMIA: ICD-10-CM

## 2019-10-09 RX ORDER — SIMVASTATIN 40 MG
TABLET ORAL
Qty: 90 TABLET | Refills: 2 | Status: SHIPPED | OUTPATIENT
Start: 2019-10-09 | End: 2020-07-21

## 2019-11-05 ENCOUNTER — OFFICE VISIT (OUTPATIENT)
Dept: PRIMARY CARE CLINIC | Age: 69
End: 2019-11-05
Payer: COMMERCIAL

## 2019-11-05 VITALS
BODY MASS INDEX: 28.5 KG/M2 | WEIGHT: 176.6 LBS | OXYGEN SATURATION: 97 % | DIASTOLIC BLOOD PRESSURE: 62 MMHG | HEART RATE: 93 BPM | SYSTOLIC BLOOD PRESSURE: 124 MMHG

## 2019-11-05 DIAGNOSIS — Z23 NEEDS FLU SHOT: ICD-10-CM

## 2019-11-05 DIAGNOSIS — I10 ESSENTIAL HYPERTENSION: ICD-10-CM

## 2019-11-05 DIAGNOSIS — E11.21 TYPE 2 DIABETES WITH NEPHROPATHY (HCC): Primary | ICD-10-CM

## 2019-11-05 LAB — HBA1C MFR BLD: 6.8 %

## 2019-11-05 PROCEDURE — 83036 HEMOGLOBIN GLYCOSYLATED A1C: CPT | Performed by: FAMILY MEDICINE

## 2019-11-05 PROCEDURE — 90653 IIV ADJUVANT VACCINE IM: CPT | Performed by: FAMILY MEDICINE

## 2019-11-05 PROCEDURE — G0008 ADMIN INFLUENZA VIRUS VAC: HCPCS | Performed by: FAMILY MEDICINE

## 2019-11-05 PROCEDURE — 99214 OFFICE O/P EST MOD 30 MIN: CPT | Performed by: FAMILY MEDICINE

## 2019-11-05 ASSESSMENT — ENCOUNTER SYMPTOMS: RESPIRATORY NEGATIVE: 1

## 2019-11-08 ENCOUNTER — TELEPHONE (OUTPATIENT)
Dept: PRIMARY CARE CLINIC | Age: 69
End: 2019-11-08

## 2019-12-30 ENCOUNTER — OFFICE VISIT (OUTPATIENT)
Dept: PRIMARY CARE CLINIC | Age: 69
End: 2019-12-30
Payer: COMMERCIAL

## 2019-12-30 VITALS
WEIGHT: 179.8 LBS | OXYGEN SATURATION: 97 % | HEART RATE: 86 BPM | SYSTOLIC BLOOD PRESSURE: 140 MMHG | BODY MASS INDEX: 28.9 KG/M2 | HEIGHT: 66 IN | DIASTOLIC BLOOD PRESSURE: 80 MMHG

## 2019-12-30 DIAGNOSIS — Z00.00 ROUTINE GENERAL MEDICAL EXAMINATION AT A HEALTH CARE FACILITY: Primary | ICD-10-CM

## 2019-12-30 PROCEDURE — G0439 PPPS, SUBSEQ VISIT: HCPCS | Performed by: NURSE PRACTITIONER

## 2019-12-30 ASSESSMENT — PATIENT HEALTH QUESTIONNAIRE - PHQ9
SUM OF ALL RESPONSES TO PHQ QUESTIONS 1-9: 0
SUM OF ALL RESPONSES TO PHQ QUESTIONS 1-9: 0

## 2020-03-09 RX ORDER — AMLODIPINE BESYLATE AND BENAZEPRIL HYDROCHLORIDE 10; 40 MG/1; MG/1
1 CAPSULE ORAL DAILY
Qty: 90 CAPSULE | Refills: 1 | Status: SHIPPED | OUTPATIENT
Start: 2020-03-09 | End: 2020-09-17

## 2020-04-06 RX ORDER — GLUCOSAM/CHON-MSM1/C/MANG/BOSW 500-416.6
TABLET ORAL
Qty: 100 EACH | Refills: 0 | Status: SHIPPED | OUTPATIENT
Start: 2020-04-06 | End: 2020-06-08 | Stop reason: SDUPTHER

## 2020-04-14 RX ORDER — TAMSULOSIN HYDROCHLORIDE 0.4 MG/1
CAPSULE ORAL
Qty: 90 CAPSULE | Refills: 0 | Status: SHIPPED | OUTPATIENT
Start: 2020-04-14 | End: 2020-07-27

## 2020-06-08 ENCOUNTER — OFFICE VISIT (OUTPATIENT)
Dept: PRIMARY CARE CLINIC | Age: 70
End: 2020-06-08
Payer: COMMERCIAL

## 2020-06-08 VITALS
HEIGHT: 66 IN | HEART RATE: 96 BPM | SYSTOLIC BLOOD PRESSURE: 146 MMHG | TEMPERATURE: 98.2 F | WEIGHT: 178.6 LBS | RESPIRATION RATE: 16 BRPM | DIASTOLIC BLOOD PRESSURE: 74 MMHG | OXYGEN SATURATION: 96 % | BODY MASS INDEX: 28.7 KG/M2

## 2020-06-08 LAB
CREATININE URINE POCT: NORMAL
HBA1C MFR BLD: 6.6 %
MICROALBUMIN/CREAT 24H UR: NORMAL MG/G{CREAT}
MICROALBUMIN/CREAT UR-RTO: NORMAL

## 2020-06-08 PROCEDURE — 82044 UR ALBUMIN SEMIQUANTITATIVE: CPT | Performed by: FAMILY MEDICINE

## 2020-06-08 PROCEDURE — 99214 OFFICE O/P EST MOD 30 MIN: CPT | Performed by: FAMILY MEDICINE

## 2020-06-08 PROCEDURE — 83036 HEMOGLOBIN GLYCOSYLATED A1C: CPT | Performed by: FAMILY MEDICINE

## 2020-06-08 RX ORDER — GLUCOSAM/CHON-MSM1/C/MANG/BOSW 500-416.6
TABLET ORAL
Qty: 100 EACH | Refills: 3 | Status: SHIPPED | OUTPATIENT
Start: 2020-06-08

## 2020-06-08 ASSESSMENT — PATIENT HEALTH QUESTIONNAIRE - PHQ9
SUM OF ALL RESPONSES TO PHQ QUESTIONS 1-9: 0
SUM OF ALL RESPONSES TO PHQ9 QUESTIONS 1 & 2: 0
2. FEELING DOWN, DEPRESSED OR HOPELESS: 0
1. LITTLE INTEREST OR PLEASURE IN DOING THINGS: 0
SUM OF ALL RESPONSES TO PHQ QUESTIONS 1-9: 0

## 2020-06-08 ASSESSMENT — ENCOUNTER SYMPTOMS: RESPIRATORY NEGATIVE: 1

## 2020-06-08 NOTE — PROGRESS NOTES
Medications   Medication Sig Dispense Refill    TRUEplus Lancets 30G MISC USE AS DIRECTED ONCE DAILY 100 each 3    tamsulosin (FLOMAX) 0.4 MG capsule Take 1 capsule by mouth once daily 90 capsule 0    amLODIPine-benazepril (LOTREL) 10-40 MG per capsule Take 1 capsule by mouth daily 90 capsule 1    TRUE METRIX BLOOD GLUCOSE TEST strip USE  STRIP TO CHECK GLUCOSE ONCE DAILY 50 each 3    simvastatin (ZOCOR) 40 MG tablet TAKE 1 TABLET BY MOUTH NIGHTLY 90 tablet 2    metFORMIN (GLUCOPHAGE-XR) 500 MG extended release tablet TAKE 1 TABLET BY MOUTH ONCE DAILY 90 tablet 2    naproxen (NAPROSYN) 500 MG tablet Take 1 tablet by mouth 2 times daily as needed for Pain 180 tablet 2    Multiple Vitamin (MULTI VITAMIN DAILY) TABS Take 1 tablet by mouth daily       No current facility-administered medications for this visit. Allergies   Allergen Reactions    Cialis [Tadalafil]      Leg pain/cramps    Ranitidine      Leg spasms       Health Maintenance   Topic Date Due    DTaP/Tdap/Td vaccine (1 - Tdap) 01/01/1970    Shingles Vaccine (1 of 2) 01/01/2001    Diabetic foot exam  04/03/2020    Lipid screen  07/16/2020    Potassium monitoring  07/16/2020    Creatinine monitoring  07/16/2020    A1C test (Diabetic or Prediabetic)  11/05/2020    Diabetic retinal exam  12/09/2020    Annual Wellness Visit (AWV)  12/30/2020    Colon cancer screen colonoscopy  02/07/2029    Flu vaccine  Completed    Pneumococcal 65+ years Vaccine  Completed    AAA screen  Completed    Hepatitis C screen  Completed    Hepatitis A vaccine  Aged Out    Hib vaccine  Aged Out    Meningococcal (ACWY) vaccine  Aged Out       Subjective:      Review of Systems   Constitutional: Negative. HENT: Positive for postnasal drip. Respiratory: Negative. Cardiovascular: Negative.        Works part time for [employer]      Objective:     BP (!) 146/74   Pulse 96   Temp 98.2 °F (36.8 °C)   Resp 16   Ht 5' 5.5\" (1.664 m)   Wt 178 lb

## 2020-06-19 ENCOUNTER — TELEPHONE (OUTPATIENT)
Dept: PRIMARY CARE CLINIC | Age: 70
End: 2020-06-19

## 2020-06-19 NOTE — TELEPHONE ENCOUNTER
Patient calling and states he received a letter about Metformin XR being recalled. Please send in alternative for this.      WM in [place]
Pt notified.
Will send in regular metformin
hgb 6.8 hct 23.6

## 2020-06-20 LAB
ALBUMIN SERPL-MCNC: NORMAL G/DL
ALP BLD-CCNC: NORMAL U/L
ALT SERPL-CCNC: NORMAL U/L
AST SERPL-CCNC: NORMAL U/L
BILIRUB SERPL-MCNC: NORMAL MG/DL
BUN BLDV-MCNC: NORMAL MG/DL
CALCIUM SERPL-MCNC: NORMAL MG/DL
CHLORIDE BLD-SCNC: NORMAL MMOL/L
CHOLESTEROL, TOTAL: 125 MG/DL
CHOLESTEROL/HDL RATIO: 2.9
CO2: NORMAL
CREAT SERPL-MCNC: NORMAL MG/DL
GLUCOSE FASTING: 128 MG/DL
HDLC SERPL-MCNC: 43 MG/DL (ref 35–70)
LDL CHOLESTEROL CALCULATED: 61 MG/DL (ref 0–160)
POTASSIUM SERPL-SCNC: NORMAL MMOL/L
SODIUM BLD-SCNC: NORMAL MMOL/L
TOTAL PROTEIN: NORMAL
TRIGL SERPL-MCNC: 106 MG/DL
VLDLC SERPL CALC-MCNC: 21 MG/DL

## 2020-07-27 RX ORDER — TAMSULOSIN HYDROCHLORIDE 0.4 MG/1
CAPSULE ORAL
Qty: 90 CAPSULE | Refills: 0 | Status: SHIPPED | OUTPATIENT
Start: 2020-07-27 | End: 2020-11-02

## 2020-08-10 ENCOUNTER — OFFICE VISIT (OUTPATIENT)
Dept: UROLOGY | Age: 70
End: 2020-08-10
Payer: COMMERCIAL

## 2020-08-10 VITALS — TEMPERATURE: 97.8 F

## 2020-08-10 PROCEDURE — 99214 OFFICE O/P EST MOD 30 MIN: CPT | Performed by: UROLOGY

## 2020-08-10 ASSESSMENT — ENCOUNTER SYMPTOMS
NAUSEA: 0
EYE REDNESS: 0
VOMITING: 0
EYE PAIN: 0
SHORTNESS OF BREATH: 0
ABDOMINAL PAIN: 0
DIARRHEA: 0
BACK PAIN: 0
WHEEZING: 0
COUGH: 0
CONSTIPATION: 0

## 2020-08-10 NOTE — PROGRESS NOTES
[facility]  [facility] UROLOGY SPECIALISTS - [facility]  [address] [address]  Dept:  Kayleigh Wilkinson [facility] Urology Office Note - Established    Patient:  Odelia Schaumann  YOB: 1950  Date: 8/10/2020    The patient is a 72 y.o. male who presents todayfor evaluation of the following problems:   Chief Complaint   Patient presents with    6 Month Follow-Up       HPI  BPH/LUTS:  Patient is here today for lower urinary tract symptoms which started a few year(s) ago. Recently the urinary symptoms are: show no change  Current medical Rx for BPH/LUTS: flomax  Lower urinary tract symptoms: decreased urinary stream and nocturia, 2 times per night  Pt reasonably content with symptom control with flomax. Summary of old records: N/A    Additional History: N/A    Procedures Today: N/A    Urinalysis today:  No results found for this visit on 08/10/20.   Last several PSA's:  No results found for: PSA  Last total testosterone:  No results found for: TESTOSTERONE    AUA Symptom Score (8/10/2020):  INCOMPLETE EMPTYING: How often have you had the sensation of not emptying your bladder?: Not at all  FREQUENCY: How often do you have to urinate less than every two hours?: Less than 1 to 5 times  INTERMITTENCY: How often have you found you stopped and started again several times when you urinated?: Not at all  URGENCY: How often have you found it difficult to postpone urination?: Not at all  WEAK STREAM: How often have you had a weak urinary stream?: Not at all  STRAINING: How often have you had to strain to start  urination?: Not at all  NOCTURIA: How many times did you typically get up at night to uriniate?: 2 Times  TOTAL I-PSS SCORE[de-identified] 3  How would you feel if you were to spend the rest of your life with your urinary condition?: Mostly Satisfied    Last BUN and creatinine:  No results found for: BUN  No results found for: CREATININE    Additional Lab/Culture results: none    Imaging Reviewed during this Office Visit: none  (results were independently reviewed by physician and radiology report verified)    PAST MEDICAL, FAMILY AND SOCIAL HISTORY UPDATE:  Past Medical History:   Diagnosis Date    H/O hepatitis      Past Surgical History:   Procedure Laterality Date    EYE SURGERY Bilateral 2011    cataract    TONSILLECTOMY       Family History   Problem Relation Age of Onset    Diabetes Mother     Other Mother         renal disease    Diabetes Father     Diabetes Sister     Diabetes Maternal Aunt     Kidney stones Brother      Outpatient Medications Marked as Taking for the 8/10/20 encounter (Office Visit) with Livia Granado MD   Medication Sig Dispense Refill    tamsulosin (FLOMAX) 0.4 MG capsule Take 1 capsule by mouth once daily 90 capsule 0    simvastatin (ZOCOR) 40 MG tablet Take 1 tablet by mouth nightly 90 tablet 1    metFORMIN (GLUCOPHAGE) 500 MG tablet Take 1 tablet by mouth daily (with breakfast) 90 tablet 1    TRUEplus Lancets 30G MISC USE AS DIRECTED ONCE DAILY 100 each 3    amLODIPine-benazepril (LOTREL) 10-40 MG per capsule Take 1 capsule by mouth daily 90 capsule 1    TRUE METRIX BLOOD GLUCOSE TEST strip USE  STRIP TO CHECK GLUCOSE ONCE DAILY 50 each 3    naproxen (NAPROSYN) 500 MG tablet Take 1 tablet by mouth 2 times daily as needed for Pain 180 tablet 2    Multiple Vitamin (MULTI VITAMIN DAILY) TABS Take 1 tablet by mouth daily         Cialis [tadalafil] and Ranitidine  Social History     Tobacco Use   Smoking Status Former Smoker    Packs/day: 0.10    Years: 1.00    Pack years: 0.10    Last attempt to quit: 1981    Years since quittin.6   Smokeless Tobacco Never Used     (Ifpatient a smoker, smoking cessation counseling offered)    Social History     Substance and Sexual Activity   Alcohol Use No    Alcohol/week: 0.0 standard drinks       REVIEW OF SYSTEMS:  Review of Systems    Physical Exam:      Vitals:    08/10/20 0817   Temp: 97.8 °F (36.6 °C)     There is no height or weight on file to calculate BMI. Patient is a 72 y.o. male in no acute distress and alert and oriented to person, place and time. Physical Exam  Constitutional: Patient in no acute distress. Neuro: Alert and oriented to person, place and time. Psych: Mood normal, affect normal  Skin: No rash noted  HEENT: Head: Normocephalic andatraumatic  Conjunctivae and EOM are normal. Pupils are equal, round  Nose:Normal  Right External Ear: Normal; Left External Ear: Normal  Mouth: Mucosa Moist  Neck: Supple  Lungs: Respiratory effort is normal  Cardiovascular: Warm & Pink  Abdomen: Soft, non-tender, non-distended with no CVA,  No flank tenderness,  Or hepatosplenomegaly       Assessment and Plan      1. Hypertrophy of prostate with urinary obstruction    2. Weak urinary stream    3. Nocturia    4. Prostate cancer screening           Plan:   F/u 6 mo with psa  Cont flomax      Return in about 6 months (around 2/10/2021) for psa. Prescriptions Ordered:  No orders of the defined types were placed in this encounter. Orders Placed:  Orders Placed This Encounter   Procedures    PSA, Diagnostic     Standing Status:   Future     Standing Expiration Date:   8/10/2021           Haroon Cervantes MD    Agree with the ROS entered by the MA.

## 2020-09-14 ENCOUNTER — OFFICE VISIT (OUTPATIENT)
Dept: PRIMARY CARE CLINIC | Age: 70
End: 2020-09-14
Payer: COMMERCIAL

## 2020-09-14 VITALS
WEIGHT: 177 LBS | SYSTOLIC BLOOD PRESSURE: 120 MMHG | OXYGEN SATURATION: 98 % | HEART RATE: 65 BPM | DIASTOLIC BLOOD PRESSURE: 60 MMHG | TEMPERATURE: 97.2 F | BODY MASS INDEX: 29.01 KG/M2

## 2020-09-14 PROCEDURE — G0008 ADMIN INFLUENZA VIRUS VAC: HCPCS | Performed by: FAMILY MEDICINE

## 2020-09-14 PROCEDURE — 99214 OFFICE O/P EST MOD 30 MIN: CPT | Performed by: FAMILY MEDICINE

## 2020-09-14 PROCEDURE — 90694 VACC AIIV4 NO PRSRV 0.5ML IM: CPT | Performed by: FAMILY MEDICINE

## 2020-09-14 ASSESSMENT — PATIENT HEALTH QUESTIONNAIRE - PHQ9
SUM OF ALL RESPONSES TO PHQ QUESTIONS 1-9: 0
2. FEELING DOWN, DEPRESSED OR HOPELESS: 0
SUM OF ALL RESPONSES TO PHQ QUESTIONS 1-9: 0
SUM OF ALL RESPONSES TO PHQ9 QUESTIONS 1 & 2: 0
1. LITTLE INTEREST OR PLEASURE IN DOING THINGS: 0

## 2020-09-14 ASSESSMENT — ENCOUNTER SYMPTOMS: RESPIRATORY NEGATIVE: 1

## 2020-09-14 NOTE — PROGRESS NOTES
Duane T Bancroftis a 72 y.o. male who presents today for his medical conditions/complaints as notedbelow. Chief Complaint   Patient presents with    Diabetes     3mth DM, HA1C, last A1c 6/8/20 6.6    Injections     Flu Vaccine    Medication Refill         HPI:   Diabetes   He presents for his follow-up diabetic visit. He has type 2 diabetes mellitus. His disease course has been stable. There are no hypoglycemic associated symptoms. Pertinent negatives for hypoglycemia include no dizziness. There are no diabetic associated symptoms. There are no hypoglycemic complications. Symptoms are stable. Diabetic complications include nephropathy. Risk factors for coronary artery disease include male sex, diabetes mellitus and hypertension. Current diabetic treatment includes oral agent (monotherapy). He is compliant with treatment all of the time. His weight is stable. He is following a generally healthy diet. There is no change in his home blood glucose trend. His breakfast blood glucose range is generally 130-140 mg/dl. (110-130-140) An ACE inhibitor/angiotensin II receptor blocker is being taken.        LDL Calculated (mg/dL)   Date Value   06/20/2020 61   07/16/2019 83   09/01/2018 89       (goal LDL is <100)   No results found for: AST, ALT, BUN  BP Readings from Last 3 Encounters:   09/14/20 120/60   06/08/20 (!) 146/74   12/30/19 (!) 140/80          (goal 120/80)    Past Medical History:   Diagnosis Date    H/O hepatitis       Past Surgical History:   Procedure Laterality Date    EYE SURGERY Bilateral 2011    cataract    TONSILLECTOMY         Family History   Problem Relation Age of Onset    Diabetes Mother     Other Mother         renal disease    Diabetes Father     Diabetes Sister     Diabetes Maternal Aunt     Kidney stones Brother        Social History     Tobacco Use    Smoking status: Former Smoker     Packs/day: 0.10     Years: 1.00     Pack years: 0.10     Last attempt to quit: 1/1/1981 Years since quittin.7    Smokeless tobacco: Never Used   Substance Use Topics    Alcohol use: No     Alcohol/week: 0.0 standard drinks      Current Outpatient Medications   Medication Sig Dispense Refill    tamsulosin (FLOMAX) 0.4 MG capsule Take 1 capsule by mouth once daily 90 capsule 0    simvastatin (ZOCOR) 40 MG tablet Take 1 tablet by mouth nightly 90 tablet 1    metFORMIN (GLUCOPHAGE) 500 MG tablet Take 1 tablet by mouth daily (with breakfast) 90 tablet 1    TRUEplus Lancets 30G MISC USE AS DIRECTED ONCE DAILY 100 each 3    amLODIPine-benazepril (LOTREL) 10-40 MG per capsule Take 1 capsule by mouth daily 90 capsule 1    TRUE METRIX BLOOD GLUCOSE TEST strip USE  STRIP TO CHECK GLUCOSE ONCE DAILY 50 each 3    naproxen (NAPROSYN) 500 MG tablet Take 1 tablet by mouth 2 times daily as needed for Pain 180 tablet 2    Multiple Vitamin (MULTI VITAMIN DAILY) TABS Take 1 tablet by mouth daily       No current facility-administered medications for this visit. Allergies   Allergen Reactions    Cialis [Tadalafil]      Leg pain/cramps    Ranitidine      Leg spasms       Health Maintenance   Topic Date Due    DTaP/Tdap/Td vaccine (1 - Tdap) 1970    Shingles Vaccine (1 of 2) 2001    Diabetic retinal exam  2020    Annual Wellness Visit (AWV)  2020    Diabetic foot exam  2021    A1C test (Diabetic or Prediabetic)  2021    Lipid screen  2021    Potassium monitoring  2021    Creatinine monitoring  2021    Colon cancer screen colonoscopy  2029    Flu vaccine  Completed    Pneumococcal 65+ years Vaccine  Completed    AAA screen  Completed    Hepatitis C screen  Completed    Hepatitis A vaccine  Aged Out    Hib vaccine  Aged Out    Meningococcal (ACWY) vaccine  Aged Out       Subjective:      Review of Systems   Respiratory: Negative. Cardiovascular: Negative. Neurological: Negative for dizziness and light-headedness. hypertension     3. Immunization due  INFLUENZA, QUADV, ADJUVANTED, 72 YRS =, IM, PF, PREFILL SYR, 0.5ML (FLUAD)        Plan:      Return in about 5 months (around 2/14/2021) for diabetes mellitus. Orders Placed This Encounter   Procedures    INFLUENZA, QUADV, ADJUVANTED, 72 YRS =, IM, PF, PREFILL SYR, 0.5ML (FLUAD)     No orders of the defined types were placed in this encounter. Patient given educational materials - see patient instructions. Discussed use, benefit, and side effects of prescribed medications. All patient questions answered. Pt voiced understanding. Reviewed health maintenance. Instructed to continue current medications, diet. Patient agreed with treatment plan. Follow up as directed.      Electronicallysigned by Marco Mckenna MD on 9/14/2020 at 6:18 PM

## 2020-11-02 RX ORDER — TAMSULOSIN HYDROCHLORIDE 0.4 MG/1
CAPSULE ORAL
Qty: 90 CAPSULE | Refills: 1 | Status: SHIPPED | OUTPATIENT
Start: 2020-11-02 | End: 2021-05-07

## 2021-01-11 ENCOUNTER — OFFICE VISIT (OUTPATIENT)
Dept: PRIMARY CARE CLINIC | Age: 71
End: 2021-01-11
Payer: COMMERCIAL

## 2021-01-11 VITALS
BODY MASS INDEX: 29.6 KG/M2 | WEIGHT: 184.2 LBS | HEART RATE: 88 BPM | DIASTOLIC BLOOD PRESSURE: 60 MMHG | HEIGHT: 66 IN | OXYGEN SATURATION: 97 % | TEMPERATURE: 98.3 F | SYSTOLIC BLOOD PRESSURE: 130 MMHG

## 2021-01-11 DIAGNOSIS — E11.9 TYPE 2 DIABETES MELLITUS WITHOUT COMPLICATION, UNSPECIFIED WHETHER LONG TERM INSULIN USE (HCC): ICD-10-CM

## 2021-01-11 DIAGNOSIS — Z00.00 ROUTINE GENERAL MEDICAL EXAMINATION AT A HEALTH CARE FACILITY: Primary | ICD-10-CM

## 2021-01-11 DIAGNOSIS — E11.21 TYPE 2 DIABETES WITH NEPHROPATHY (HCC): ICD-10-CM

## 2021-01-11 DIAGNOSIS — G89.29 CHRONIC PAIN OF LEFT KNEE: ICD-10-CM

## 2021-01-11 DIAGNOSIS — M25.562 CHRONIC PAIN OF LEFT KNEE: ICD-10-CM

## 2021-01-11 LAB — HBA1C MFR BLD: 6.8 %

## 2021-01-11 PROCEDURE — G0439 PPPS, SUBSEQ VISIT: HCPCS | Performed by: FAMILY MEDICINE

## 2021-01-11 PROCEDURE — 83036 HEMOGLOBIN GLYCOSYLATED A1C: CPT | Performed by: FAMILY MEDICINE

## 2021-01-11 SDOH — ECONOMIC STABILITY: FOOD INSECURITY: WITHIN THE PAST 12 MONTHS, YOU WORRIED THAT YOUR FOOD WOULD RUN OUT BEFORE YOU GOT MONEY TO BUY MORE.: NEVER TRUE

## 2021-01-11 SDOH — ECONOMIC STABILITY: FOOD INSECURITY: WITHIN THE PAST 12 MONTHS, THE FOOD YOU BOUGHT JUST DIDN'T LAST AND YOU DIDN'T HAVE MONEY TO GET MORE.: NEVER TRUE

## 2021-01-11 SDOH — ECONOMIC STABILITY: INCOME INSECURITY: HOW HARD IS IT FOR YOU TO PAY FOR THE VERY BASICS LIKE FOOD, HOUSING, MEDICAL CARE, AND HEATING?: NOT VERY HARD

## 2021-01-11 ASSESSMENT — PATIENT HEALTH QUESTIONNAIRE - PHQ9
SUM OF ALL RESPONSES TO PHQ QUESTIONS 1-9: 0
2. FEELING DOWN, DEPRESSED OR HOPELESS: 0
SUM OF ALL RESPONSES TO PHQ QUESTIONS 1-9: 0
SUM OF ALL RESPONSES TO PHQ9 QUESTIONS 1 & 2: 0

## 2021-01-11 NOTE — PATIENT INSTRUCTIONS
Personalized Preventive Plan for Shane Schwartz - 1/11/2021  Medicare offers a range of preventive health benefits. Some of the tests and screenings are paid in full while other may be subject to a deductible, co-insurance, and/or copay. Some of these benefits include a comprehensive review of your medical history including lifestyle, illnesses that may run in your family, and various assessments and screenings as appropriate. After reviewing your medical record and screening and assessments performed today your provider may have ordered immunizations, labs, imaging, and/or referrals for you. A list of these orders (if applicable) as well as your Preventive Care list are included within your After Visit Summary for your review. Other Preventive Recommendations:    · A preventive eye exam performed by an eye specialist is recommended every 1-2 years to screen for glaucoma; cataracts, macular degeneration, and other eye disorders. · A preventive dental visit is recommended every 6 months. · Try to get at least 150 minutes of exercise per week or 10,000 steps per day on a pedometer . · Order or download the FREE \"Exercise & Physical Activity: Your Everyday Guide\" from [employer]. Call 2-941.599.8804 or search [employer] online. · You need 4288-9525 mg of calcium and 7487-1699 IU of vitamin D per day. It is possible to meet your calcium requirement with diet alone, but a vitamin D supplement is usually necessary to meet this goal.  · When exposed to the sun, use a sunscreen that protects against both UVA and UVB radiation with an SPF of 30 or greater. Reapply every 2 to 3 hours or after sweating, drying off with a towel, or swimming. · Always wear a seat belt when traveling in a car. Always wear a helmet when riding a bicycle or motorcycle.   Patient Education        Diabetes Foot Health: Care Instructions  Your Care Instructions When you have diabetes, your feet need extra care and attention. Diabetes can damage the nerve endings and blood vessels in your feet, making you less likely to notice when your feet are injured. Diabetes also limits your body's ability to fight infection and get blood to areas that need it. If you get a minor foot injury, it could become an ulcer or a serious infection. With good foot care, you can prevent most of these problems. Caring for your feet can be quick and easy. Most of the care can be done when you are bathing or getting ready for bed. Follow-up care is a key part of your treatment and safety. Be sure to make and go to all appointments, and call your doctor if you are having problems. It's also a good idea to know your test results and keep a list of the medicines you take. How can you care for yourself at home? Keep your blood sugar close to normal by watching what and how much you eat, monitoring blood sugar, taking medicines if prescribed, and getting regular exercise. Do not smoke. Smoking affects blood flow and can make foot problems worse. If you need help quitting, talk to your doctor about stop-smoking programs and medicines. These can increase your chances of quitting for good. Eat a diet that is low in fats. High fat intake can cause fat to build up in your blood vessels and decrease blood flow. Inspect your feet daily for blisters, cuts, cracks, or sores. If you cannot see well, use a mirror or have someone help you. Take care of your feet:  Wash your feet every day. Use warm (not hot) water. Check the water temperature with your wrists or other part of your body, not your feet. Dry your feet well. Pat them dry. Do not rub the skin on your feet too hard. Dry well between your toes. If the skin on your feet stays moist, bacteria or a fungus can grow, which can lead to infection. Keep your skin soft. Use moisturizing skin cream to keep the skin on your feet soft and prevent calluses and cracks. But do not put the cream between your toes, and stop using any cream that causes a rash. Clean underneath your toenails carefully. Do not use a sharp object to clean underneath your toenails. Use the blunt end of a nail file or other rounded tool. Trim and file your toenails straight across to prevent ingrown toenails. Use a nail clipper, not scissors. Use an emery board to smooth the edges. Change socks daily. Socks without seams are best, because seams often rub the feet. You can find socks for people with diabetes from specialty catalogs. Look inside your shoes every day for things like gravel or torn linings, which could cause blisters or sores. Buy shoes that fit well:  Look for shoes that have plenty of space around the toes. This helps prevent bunions and blisters. Try on shoes while wearing the kind of socks you will usually wear with the shoes. Avoid plastic shoes. They may rub your feet and cause blisters. Good shoes should be made of materials that are flexible and breathable, such as leather or cloth. Break in new shoes slowly by wearing them for no more than an hour a day for several days. Take extra time to check your feet for red areas, blisters, or other problems after you wear new shoes. Do not go barefoot. Do not wear sandals, and do not wear shoes with very thin soles. Thin soles are easy to puncture. They also do not protect your feet from hot pavement or cold weather. Have your doctor check your feet during each visit. If you have a foot problem, see your doctor. Do not try to treat an early foot problem at home. Home remedies or treatments that you can buy without a prescription (such as corn removers) can be harmful. Always get early treatment for foot problems. A minor irritation can lead to a major problem if not properly cared for early. When should you call for help? Call your doctor now or seek immediate medical care if:    You have a foot sore, an ulcer or break in the skin that is not healing after 4 days, bleeding corns or calluses, or an ingrown toenail.     You have blue or black areas, which can mean bruising or blood flow problems.     You have peeling skin or tiny blisters between your toes or cracking or oozing of the skin.     You have a fever for more than 24 hours and a foot sore.     You have new numbness or tingling in your feet that does not go away after you move your feet or change positions.     You have unexplained or unusual swelling of the foot or ankle. Watch closely for changes in your health, and be sure to contact your doctor if:    You cannot do proper foot care. Where can you learn more? Go to https://ConteXtreampeaniyaheb.BombBomb. org and sign in to your Max[employer]te account. Enter A739 in the [employer] box to learn more about \"Diabetes Foot Health: Care Instructions. \"     If you do not have an account, please click on the \"Sign Up Now\" link. Current as of: December 20, 2019               Content Version: 12.6  © 5624-4191 [employer]. Care instructions adapted under license by [facility] ([facility]). If you have questions about a medical condition or this instruction, always ask your healthcare professional. [place] [address] any warranty or liability for your use of this information. Patient Education        Well Visit, Over 72: Care Instructions  Your Care Instructions     Physical exams can help you stay healthy. Your doctor has checked your overall health and may have suggested ways to take good care of yourself. He or she also may have recommended tests. At home, you can help prevent illness with healthy eating, regular exercise, and other steps. Hearing. Tell your doctor if you notice any change in your hearing. You can have tests to find out how well you hear. Colon cancer tests. Keep having colon cancer tests as your doctor recommends. You can have one of several types of tests. Heart attack and stroke risk. At least every 4 to 6 years, you should have your risk for heart attack and stroke assessed. Your doctor uses factors such as your age, blood pressure, cholesterol, and whether you smoke or have diabetes to show what your risk for a heart attack or stroke is over the next 10 years. Osteoporosis. Talk to your doctor about whether you should have a bone density test to find out whether you have thinning bones. Ask your doctor if you need to take a calcium plus vitamin D supplement. You may be able to get enough calcium and vitamin D through your diet. For women  Pap test and pelvic exam. You may no longer need a Pap test. Talk with your doctor about whether to stop or continue to have Pap tests. Breast exam and mammogram. Ask how often you should have a mammogram, which is an X-ray of your breasts. A mammogram can spot breast cancer before it can be felt and when it is easiest to treat. Thyroid disease. Talk to your doctor about whether to have your thyroid checked as part of a regular physical exam. Women have an increased chance of a thyroid problem. For men  Prostate exam. Talk to your doctor about whether you should have a blood test (called a PSA test) for prostate cancer. Experts recommend that you discuss the benefits and risks of the test with your doctor before you decide whether to have this test. Some experts say that men ages 79 and older no longer need testing. Abdominal aortic aneurysm. Ask your doctor whether you should have a test to check for an aneurysm. You may need a test if you ever smoked or if your parent, brother, sister, or child has had an aneurysm. When should you call for help? Watch closely for changes in your health, and be sure to contact your doctor if you have any problems or symptoms that concern you. Where can you learn more? Go to https://chpeguilhermeewbob.Kore Virtual [email or web address] org and sign in to your Infinancials account. Enter E299 in the [facility] box to learn more about \"Well Visit, Over 65: Care Instructions. \"     If you do not have an account, please click on the \"Sign Up Now\" link. Current as of: May 27, 2020               Content Version: 12.6  © 2623-8525 [employer]. Care instructions adapted under license by [address] If you have questions about a medical condition or this instruction, always ask your healthcare professional. [address] any warranty or liability for your use of this information. Patient Education        Learning About Living Mayra Oneill  What is a living will? A living will, also called a declaration, is a legal form. It tells your family and your doctor your wishes when you can't speak for yourself. It's used by the health professionals who will treat you as you near the end of your life or if you get seriously hurt or ill. If you put your wishes in writing, your loved ones and others will know what kind of care you want. They won't need to guess. This can ease your mind and be helpful to others. And you can change or cancel your living will at any time. A living will is not the same as an estate or property will. An estate will explains what you want to happen with your money and property after you die. How do you use it? A living will is used to describe the kinds of treatment or life support you want as you near the end of your life or if you get seriously hurt or ill. Keep these facts in mind about living vargas. Your living will is used only if you can't speak or make decisions for yourself. Most often, one or more doctors must certify that you can't speak or decide for yourself before your living will takes effect. If you get better and can speak for yourself again, you can accept or refuse any treatment. It doesn't matter what you said in your living will. Some states may limit your right to refuse treatment in certain cases. For example, you may need to clearly state in your living will that you don't want artificial hydration and nutrition, such as being fed through a tube. Is a living will a legal document? A living will is a legal document. Each state has its own laws about living vargas. And a living will may be called something else in your state. Here are some things to know about living vargas. You don't need an  to complete a living will. But legal advice can be helpful if your state's laws are unclear. It can also help if your health history is complicated or your family can't agree on what should be in your living will. You can change your living will at any time. Some people find that their wishes about end-of-life care change as their health changes. If you make big changes to your living will, complete a new form. If you move to another state, make sure that your living will is legal in the state where you now live. In most cases, doctors will respect your wishes even if you have a form from a different state. You might use a universal form that has been approved by many states. This kind of form can sometimes be filled out and stored online. Your digital copy will then be available wherever you have a connection to the internet. The doctors and nurses who need to treat you can find it right away. Your state may offer an online registry. This is another place where you can store your living will online. It's a good idea to get your living will notarized. This means using a person called a  to watch two people sign, or witness, your living will. What should you know when you create a living will? Here are some questions to ask yourself as you make your living will:  Do you know enough about life support methods that might be used? If not, talk to your doctor so you know what might be done if you can't breathe on your own, your heart stops, or you can't swallow. What things would you still want to be able to do after you receive life-support methods? Would you want to be able to walk? To speak? To eat on your own? To live without the help of machines? Do you want certain Restorationism practices performed if you become very ill? If you have a choice, where do you want to be cared for? In your home? At a hospital or nursing home? If you have a choice at the end of your life, where would you prefer to die? At home? In a hospital or nursing home? Somewhere else? Would you prefer to be buried or cremated? Do you want your organs to be donated after you die? What should you do with your living will? Make sure that your family members and your health care agent have copies of your living will (also called a declaration). Give your doctor a copy of your living will. Ask him or her to keep it as part of your medical record. If you have more than one doctor, make sure that each one has a copy. Put a copy of your living will where it can be easily found. For example, some people may put a copy on their refrigerator door. If you are using a digital copy, be sure your doctor, family members, and health care agent know how to find and access it. Where can you learn more? Go to https://chpeguilhermeewbob.[email or web address] org and sign in to your [employer] account. Enter Z954 in the [employer]See box to learn more about \"Learning About Living Perroabbe. \" If you do not have an account, please click on the \"Sign Up Now\" link. Current as of: December 9, 2019               Content Version: 12.6  © 2006-2020 [employer], [employer]. Care instructions adapted under license by [facility] ([facility]). If you have questions about a medical condition or this instruction, always ask your healthcare professional. [address] any warranty or liability for your use of this information. Patient Education        Knee Arthritis: Exercises  Introduction  Here are some examples of exercises for you to try. The exercises may be suggested for a condition or for rehabilitation. Start each exercise slowly. Ease off the exercises if you start to have pain. You will be told when to start these exercises and which ones will work best for you. How to do the exercises  Knee flexion with heel slide   Lie on your back with your knees bent. Slide your heel back by bending your affected knee as far as you can. Then hook your other foot around your ankle to help pull your heel even farther back. Hold for about 6 seconds, then rest for up to 10 seconds. Repeat 8 to 12 times. Switch legs and repeat steps 1 through 4, even if only one knee is sore. Quad [employer] with your affected leg straight and supported on the floor or a firm bed. Place a small, rolled-up towel under your knee. Your other leg should be bent, with that foot flat on the floor. Tighten the thigh muscles of your affected leg by pressing the back of your knee down into the towel. Hold for about 6 seconds, then rest for up to 10 seconds. Repeat 8 to 12 times. Switch legs and repeat steps 1 through 4, even if only one knee is sore.     Straight-leg raises to the front Lie on your back with your good knee bent so that your foot rests flat on the floor. Your affected leg should be straight. Make sure that your low back has a normal curve. You should be able to slip your hand in between the floor and the small of your back, with your palm touching the floor and your back touching the back of your hand. Tighten the thigh muscles in your affected leg by pressing the back of your knee flat down to the floor. Hold your knee straight. Keeping the thigh muscles tight and your leg straight, lift your affected leg up so that your heel is about 12 inches off the floor. Hold for about 6 seconds, then lower slowly. Relax for up to 10 seconds between repetitions. Repeat 8 to 12 times. Switch legs and repeat steps 1 through 5, even if only one knee is sore. Active knee flexion   Lie on your stomach with your knees straight. If your kneecap is uncomfortable, roll up a washcloth and put it under your leg just above your kneecap. Lift the foot of your affected leg by bending the knee so that you bring the foot up toward your buttock. If this motion hurts, try it without bending your knee quite as far. This may help you avoid any painful motion. Slowly move your leg up and down. Repeat 8 to 12 times. Switch legs and repeat steps 1 through 4, even if only one knee is sore. Quadriceps stretch (facedown)   Lie flat on your stomach, and rest your face on the floor. Wrap a towel or belt strap around the lower part of your affected leg. Then use the towel or belt strap to slowly pull your heel toward your buttock until you feel a stretch. Hold for about 15 to 30 seconds, then relax your leg against the towel or belt strap. Repeat 2 to 4 times. Switch legs and repeat steps 1 through 4, even if only one knee is sore. Stationary exercise bike   If you do not have a stationary exercise bike at home, you can find one to ride at your local health club or community center. Adjust the height of the bike seat so that your knee is slightly bent when your leg is extended downward. If your knee hurts when the pedal reaches the top, you can raise the seat so that your knee does not bend as much. Start slowly. At first, try to do 5 to 10 minutes of cycling with little to no resistance. Then increase your time and the resistance bit by bit until you can do 20 to 30 minutes without pain. If you start to have pain, rest your knee until your pain gets back to the level that is normal for you. Or cycle for less time or with less effort. Follow-up care is a key part of your treatment and safety. Be sure to make and go to all appointments, and call your doctor if you are having problems. It's also a good idea to know your test results and keep a list of the medicines you take. Where can you learn more? Go to [email or web address]://[email or web address].Cylon [email or web address] org and sign in to your Pricing Assistant account. Enter C159 in the [employer] box to learn more about \"Knee Arthritis: Exercises. \"     If you do not have an account, please click on the \"Sign Up Now\" link. Current as of: March 2, 2020               Content Version: 12.6  © 9308-3866 [employer]orporated. Care instructions adapted under license by [facility] ([facility]). If you have questions about a medical condition or this instruction, always ask your healthcare professional. [place] [address] any warranty or liability for your use of this information.

## 2021-01-11 NOTE — PROGRESS NOTES
renal disease    Diabetes Father     Diabetes Sister     Diabetes Maternal Aunt     Kidney stones Brother        CareTeam (Including outside providers/suppliers regularly involved in providing care):   Patient Care Team:  Shelli Solis MD as PCP - General (Family Medicine)  Shelli Solis MD as PCP - [facility] Empaneled Provider    Wt Readings from Last 3 Encounters:   01/11/21 184 lb 3.2 oz (83.6 kg)   09/14/20 177 lb (80.3 kg)   06/08/20 178 lb 9.6 oz (81 kg)     Vitals:    01/11/21 1046   BP: (!) 144/62   Pulse: 88   Temp: 98.3 °F (36.8 °C)   SpO2: 97%   Weight: 184 lb 3.2 oz (83.6 kg)   Height: 5' 5.5\" (1.664 m)     Body mass index is 30.19 kg/m². Based upon direct observation of the patient, evaluation of cognition reveals recent and remote memory intact. Physical Exam  Vitals signs and nursing note reviewed. Constitutional:       General: He is not in acute distress. Appearance: He is well-developed. He is not ill-appearing. HENT:      Head: Normocephalic and atraumatic. Right Ear: External ear normal.      Left Ear: External ear normal.   Eyes:      General: No scleral icterus. Right eye: No discharge. Left eye: No discharge. Conjunctiva/sclera: Conjunctivae normal.      Pupils: Pupils are equal, round, and reactive to light. Neck:      Thyroid: No thyromegaly. Trachea: No tracheal deviation. Cardiovascular:      Rate and Rhythm: Normal rate and regular rhythm. Heart sounds: Normal heart sounds. Pulmonary:      Effort: Pulmonary effort is normal. No respiratory distress. Breath sounds: Normal breath sounds. No wheezing. Musculoskeletal:      Right lower leg: No edema. Left lower leg: No edema. Lymphadenopathy:      Cervical: No cervical adenopathy. Skin:     General: Skin is warm. Findings: No rash. Neurological:      Mental Status: He is alert and oriented to person, place, and time.    Psychiatric:

## 2021-01-11 NOTE — TELEPHONE ENCOUNTER
OV 1/11/21    metFORMIN (GLUCOPHAGE) 500 MG tablet     [facility], [address] [facility]

## 2021-01-26 DIAGNOSIS — G89.29 CHRONIC PAIN OF LEFT KNEE: ICD-10-CM

## 2021-01-26 DIAGNOSIS — M25.562 CHRONIC PAIN OF LEFT KNEE: ICD-10-CM

## 2021-02-15 ENCOUNTER — OFFICE VISIT (OUTPATIENT)
Dept: UROLOGY | Age: 71
End: 2021-02-15
Payer: COMMERCIAL

## 2021-02-15 VITALS — HEART RATE: 68 BPM | SYSTOLIC BLOOD PRESSURE: 128 MMHG | DIASTOLIC BLOOD PRESSURE: 58 MMHG | TEMPERATURE: 98.2 F

## 2021-02-15 DIAGNOSIS — Z12.5 PROSTATE CANCER SCREENING: ICD-10-CM

## 2021-02-15 DIAGNOSIS — N40.1 HYPERTROPHY OF PROSTATE WITH URINARY OBSTRUCTION: Primary | ICD-10-CM

## 2021-02-15 DIAGNOSIS — R35.1 NOCTURIA: ICD-10-CM

## 2021-02-15 DIAGNOSIS — N13.8 HYPERTROPHY OF PROSTATE WITH URINARY OBSTRUCTION: Primary | ICD-10-CM

## 2021-02-15 DIAGNOSIS — R39.12 WEAK URINARY STREAM: ICD-10-CM

## 2021-02-15 PROCEDURE — 99214 OFFICE O/P EST MOD 30 MIN: CPT | Performed by: UROLOGY

## 2021-02-15 ASSESSMENT — ENCOUNTER SYMPTOMS
COUGH: 0
ABDOMINAL PAIN: 0
COLOR CHANGE: 0
EYE PAIN: 0
VOMITING: 0
NAUSEA: 0
SHORTNESS OF BREATH: 0
BACK PAIN: 0
WHEEZING: 0
EYE REDNESS: 0

## 2021-02-15 NOTE — PROGRESS NOTES
[address] [address]  Dept: [facility] Urology Office Note - Established    Patient:  Hayder Mckee  YOB: 1950  Date: 2/15/2021    The patient is a 80 y.o. male who presents todayfor evaluation of the following problems:   Chief Complaint   Patient presents with    Benign Prostatic Hypertrophy     6 mo psa care everywhere        HPI  This is a very pleasant 20-year-old gentleman who has a history of BPH. He does have some lower urinary tract symptoms including a weak stream, frequency and nocturia 2-3 times per night. His symptoms are well controlled with Flomax. His PSA 0.47. This is stable over the last several years. He has no family history of prostate cancer. Presently he is very satisfied with his urinary symptom control. Summary of old records: N/A    Additional History: N/A    Procedures Today: N/A    Urinalysis today:  No results found for this visit on 02/15/21. Last several PSA's:  No results found for: PSA  Last total testosterone:  No results found for: TESTOSTERONE    AUA Symptom Score (2/15/2021):   INCOMPLETE EMPTYING: How often have you had the sensation of not emptying your bladder?: Not at all  FREQUENCY: How often do you have to urinate less than every two hours?: Not at all  INTERMITTENCY: How often have you found you stopped and started again several times when you urinated?: Not at all  URGENCY: How often have you found it difficult to postpone urination?: Not at all  WEAK STREAM: How often have you had a weak urinary stream?: Not at all  STRAINING: How often have you had to strain to start  urination?: Not at all  NOCTURIA: How many times did you typically get up at night to uriniate?: 2 Times  TOTAL I-PSS SCORE[de-identified] 2  How would you feel if you were to spend the rest of your life with your urinary condition?: Mostly Satisfied    Last BUN and creatinine: No results found for: BUN  No results found for: CREATININE    Additional Lab/Culture results: none    Imaging Reviewed during this Office Visit: none  (results were independently reviewed by physician and radiology report verified)    PAST MEDICAL, FAMILY AND SOCIAL HISTORY UPDATE:  Past Medical History:   Diagnosis Date    H/O hepatitis      Past Surgical History:   Procedure Laterality Date    EYE SURGERY Bilateral 2011    cataract    TONSILLECTOMY       Family History   Problem Relation Age of Onset    Diabetes Mother     Other Mother         renal disease    Diabetes Father     Diabetes Sister     Diabetes Maternal Aunt     Kidney stones Brother      Outpatient Medications Marked as Taking for the 2/15/21 encounter (Office Visit) with Nidhi Naqvi MD   Medication Sig Dispense Refill    simvastatin (ZOCOR) 40 MG tablet Take 1 tablet by mouth nightly 90 tablet 1    metFORMIN (GLUCOPHAGE) 500 MG tablet Take 1 tablet by mouth daily (with breakfast) 90 tablet 2    tamsulosin (FLOMAX) 0.4 MG capsule Take 1 capsule by mouth once daily 90 capsule 1    TRUE METRIX BLOOD GLUCOSE TEST strip USE  STRIP TO CHECK GLUCOSE ONCE DAILY 50 each 5    amLODIPine-benazepril (LOTREL) 10-40 MG per capsule Take 1 capsule by mouth once daily 90 capsule 1    TRUEplus Lancets 30G MISC USE AS DIRECTED ONCE DAILY 100 each 3    naproxen (NAPROSYN) 500 MG tablet Take 1 tablet by mouth 2 times daily as needed for Pain 180 tablet 2    Multiple Vitamin (MULTI VITAMIN DAILY) TABS Take 1 tablet by mouth daily         Cialis [tadalafil] and Ranitidine  Social History     Tobacco Use   Smoking Status Former Smoker    Packs/day: 0.10    Years: 1.00    Pack years: 0.10    Quit date: 1981    Years since quittin.1   Smokeless Tobacco Never Used     (Ifpatient a smoker, smoking cessation counseling offered)    Social History     Substance and Sexual Activity   Alcohol Use No    Alcohol/week: 0.0 standard drinks REVIEW OF SYSTEMS:  Review of Systems    Physical Exam:      Vitals:    02/15/21 0813   BP: (!) 128/58   Pulse: 68   Temp: 98.2 °F (36.8 °C)     There is no height or weight on file to calculate BMI. Patient is a 80 y.o. male in no acute distress and alert and oriented to person, place and time. Physical Exam  Constitutional: Patient in no acute distress. Neuro: Alert and oriented to person, place and time. Psych: Mood normal, affect normal  Skin: No rash noted  HEENT: Head: Normocephalic andatraumatic  Conjunctivae and EOM are normal. Pupils are equal, round  Nose:Normal  Right External Ear: Normal; Left External Ear: Normal  Mouth: Mucosa Moist  Neck: Supple  Lungs: Respiratory effort is normal  Cardiovascular: Warm & Pink  Abdomen: Soft, non-tender, non-distended with no CVA,  No flank tenderness,  Or hepatosplenomegaly       Assessment and Plan      1. Hypertrophy of prostate with urinary obstruction    2. Weak urinary stream    3. Nocturia    4. Prostate cancer screening           Plan:   Cont flomax  F/u 1 year psa      No follow-ups on file. Prescriptions Ordered:  No orders of the defined types were placed in this encounter. Orders Placed:  No orders of the defined types were placed in this encounter. Fritz Lawrence MD    Agree with the ROS entered by the MA.

## 2021-05-06 DIAGNOSIS — R35.0 FREQUENCY OF URINATION: ICD-10-CM

## 2021-05-06 DIAGNOSIS — N13.8 HYPERTROPHY OF PROSTATE WITH URINARY OBSTRUCTION: ICD-10-CM

## 2021-05-06 DIAGNOSIS — N40.1 HYPERTROPHY OF PROSTATE WITH URINARY OBSTRUCTION: ICD-10-CM

## 2021-05-06 DIAGNOSIS — R39.12 WEAK URINARY STREAM: ICD-10-CM

## 2021-05-06 DIAGNOSIS — R35.1 NOCTURIA: ICD-10-CM

## 2021-05-07 RX ORDER — TAMSULOSIN HYDROCHLORIDE 0.4 MG/1
CAPSULE ORAL
Qty: 90 CAPSULE | Refills: 3 | Status: SHIPPED | OUTPATIENT
Start: 2021-05-07 | End: 2022-03-07 | Stop reason: SDUPTHER

## 2021-07-12 ENCOUNTER — OFFICE VISIT (OUTPATIENT)
Dept: PRIMARY CARE CLINIC | Age: 71
End: 2021-07-12
Payer: COMMERCIAL

## 2021-07-12 VITALS
DIASTOLIC BLOOD PRESSURE: 58 MMHG | WEIGHT: 180.6 LBS | HEART RATE: 77 BPM | BODY MASS INDEX: 29.6 KG/M2 | SYSTOLIC BLOOD PRESSURE: 132 MMHG | OXYGEN SATURATION: 97 %

## 2021-07-12 DIAGNOSIS — M25.562 CHRONIC PAIN OF LEFT KNEE: ICD-10-CM

## 2021-07-12 DIAGNOSIS — I10 ESSENTIAL HYPERTENSION: ICD-10-CM

## 2021-07-12 DIAGNOSIS — G89.29 CHRONIC PAIN OF LEFT KNEE: ICD-10-CM

## 2021-07-12 DIAGNOSIS — M17.12 ARTHRITIS OF LEFT KNEE: ICD-10-CM

## 2021-07-12 DIAGNOSIS — M17.10 ARTHRITIS OF KNEE: ICD-10-CM

## 2021-07-12 DIAGNOSIS — E11.21 TYPE 2 DIABETES WITH NEPHROPATHY (HCC): Primary | ICD-10-CM

## 2021-07-12 LAB
CREATININE URINE POCT: 100
HBA1C MFR BLD: 6.6 %
MICROALBUMIN/CREAT 24H UR: 10 MG/G{CREAT}
MICROALBUMIN/CREAT UR-RTO: <30

## 2021-07-12 PROCEDURE — 83036 HEMOGLOBIN GLYCOSYLATED A1C: CPT | Performed by: FAMILY MEDICINE

## 2021-07-12 PROCEDURE — 82044 UR ALBUMIN SEMIQUANTITATIVE: CPT | Performed by: FAMILY MEDICINE

## 2021-07-12 PROCEDURE — 99214 OFFICE O/P EST MOD 30 MIN: CPT | Performed by: FAMILY MEDICINE

## 2021-07-12 RX ORDER — NAPROXEN 500 MG/1
500 TABLET ORAL 2 TIMES DAILY PRN
Qty: 180 TABLET | Refills: 2 | Status: SHIPPED | OUTPATIENT
Start: 2021-07-12 | End: 2022-06-15

## 2021-07-12 RX ORDER — LANCETS 30 GAUGE
1 EACH MISCELLANEOUS DAILY
Qty: 100 EACH | Refills: 5 | Status: SHIPPED | OUTPATIENT
Start: 2021-07-12

## 2021-07-12 RX ORDER — GLUCOSAMINE HCL/CHONDROITIN SU 500-400 MG
CAPSULE ORAL
Qty: 50 STRIP | Refills: 3 | Status: SHIPPED | OUTPATIENT
Start: 2021-07-12 | End: 2021-07-19 | Stop reason: SDUPTHER

## 2021-07-12 ASSESSMENT — ENCOUNTER SYMPTOMS: RESPIRATORY NEGATIVE: 1

## 2021-07-12 NOTE — PROGRESS NOTES
[address] [facility] PRIMARY CARE  [address] [facility] [address]  Dept: [phone]    Joel Ferreira is a 80 y.o. male Established patient, who presents today for his medical conditions/complaintsas noted below. Chief Complaint   Patient presents with    Diabetes       HPI:     HPI  Sugars 120-180  No low sugar reactions  Glucometer is wearing out. Left knee pain tre medially    Reviewed prior notes None  Reviewed previous Labs    LDL Calculated (mg/dL)   Date Value   2020 61   2019 83   2018 89       (goal LDL is <100)   Hemoglobin A1C (%)   Date Value   2021 6.6     BP Readings from Last 3 Encounters:   21 (!) 132/58   02/15/21 (!) 128/58   21 130/60          (goal 120/80)    Past Medical History:   Diagnosis Date    H/O hepatitis       Past Surgical History:   Procedure Laterality Date    EYE SURGERY Bilateral     cataract    TONSILLECTOMY         Family History   Problem Relation Age of Onset    Diabetes Mother     Other Mother         renal disease    Diabetes Father     Diabetes Sister     Diabetes Maternal Aunt     Kidney stones Brother        Social History     Tobacco Use    Smoking status: Former Smoker     Packs/day: 0.10     Years: 1.00     Pack years: 0.10     Quit date: 1981     Years since quittin.5    Smokeless tobacco: Never Used   Substance Use Topics    Alcohol use: No     Alcohol/week: 0.0 standard drinks      Current Outpatient Medications   Medication Sig Dispense Refill    Lancets MISC 1 each by Does not apply route daily 100 each 5    blood glucose monitor strips Test one times a day & as needed for symptoms of irregular blood glucose.  Type 2 DM not on insulin 50 strip 3    naproxen (NAPROSYN) 500 MG tablet Take 1 tablet by mouth 2 times daily as needed for Pain 180 tablet 2    blood glucose test strips (TRUE METRIX BLOOD GLUCOSE TEST) strip USE AS DIRECTED ONCE DAILY 50 each scleral icterus. Right eye: No discharge. Left eye: No discharge. Conjunctiva/sclera: Conjunctivae normal.      Pupils: Pupils are equal, round, and reactive to light. Neck:      Thyroid: No thyromegaly. Trachea: No tracheal deviation. Cardiovascular:      Rate and Rhythm: Normal rate and regular rhythm. Pulses:           Dorsalis pedis pulses are 2+ on the right side and 2+ on the left side. Posterior tibial pulses are 2+ on the right side and 2+ on the left side. Heart sounds: Normal heart sounds. Pulmonary:      Effort: Pulmonary effort is normal. No respiratory distress. Breath sounds: Normal breath sounds. No wheezing. Musculoskeletal:      Right lower leg: No edema. Left lower leg: No edema. Comments: Left knee is swollen especially medially. No effusion, OA changes   Feet:      Right foot:      Protective Sensation: 6 sites tested. 6 sites sensed. Skin integrity: Skin integrity normal.      Left foot:      Protective Sensation: 6 sites tested. 6 sites sensed. Skin integrity: Skin integrity normal.   Lymphadenopathy:      Cervical: No cervical adenopathy. Skin:     General: Skin is warm. Findings: No rash. Neurological:      Mental Status: He is alert and oriented to person, place, and time. Psychiatric:         Mood and Affect: Mood normal.         Behavior: Behavior normal.         Thought Content: Thought content normal.         Assessment:       Diagnosis Orders   1. Type 2 diabetes with nephropathy (HCC)  [place] COLLECTION CAPILLARY BLOOD SPECIMEN    POCT glycosylated hemoglobin (Hb A1C)    POCT microalbumin    Lipid Panel    Comprehensive Metabolic Panel, Fasting     DIABETES FOOT EXAM    DME Order for Glucometer as OP    Lancets MISC    blood glucose monitor strips   2. Essential hypertension  Lipid Panel    Comprehensive Metabolic Panel, Fasting   3.  Chronic pain of left knee  External Referral To Orthopedic Surgery 4. Arthritis of left knee  External Referral To Orthopedic Surgery   5. Arthritis of knee  naproxen (NAPROSYN) 500 MG tablet        Plan:      Return in about 4 months (around 2021) for diabetes mellitus. Orders Placed This Encounter   Procedures    Lipid Panel     Standing Status:   Future     Standing Expiration Date:   2022     Order Specific Question:   Is Patient Fasting?/# of Hours     Answer:   yes    Comprehensive Metabolic Panel, Fasting     Standing Status:   Future     Standing Expiration Date:   2022    External Referral To Orthopedic Surgery     Referral Priority:   Routine     Referral Type:   Consult for Advice and Opinion     Referral Reason:   Specialty Services Required     Referred to Provider:   Tayla Steele     Requested Specialty:   Orthopedic Surgery     Number of Visits Requested:   1    POCT glycosylated hemoglobin (Hb A1C)    POCT microalbumin    HM DIABETES FOOT EXAM    [place] COLLECTION CAPILLARY BLOOD SPECIMEN    DME Order for Glucometer as OP     You must complete the order parameters below and add the medical necessity documentation for this DME in a separate note. Home blood glucose monitor    Diagnosis: Type 2 DM not on insulin    Testing frequency: Check blood sugars and record one times daily    Duration: purchase     Orders Placed This Encounter   Medications    Lancets MISC     Si each by Does not apply route daily     Dispense:  100 each     Refill:  5    blood glucose monitor strips     Sig: Test one times a day & as needed for symptoms of irregular blood glucose. Type 2 DM not on insulin     Dispense:  50 strip     Refill:  3     Brand per patient preference. May round up to next available package size.  naproxen (NAPROSYN) 500 MG tablet     Sig: Take 1 tablet by mouth 2 times daily as needed for Pain     Dispense:  180 tablet     Refill:  2       Patient given educationalmaterials - see patient instructions.   Discussed use, benefit, and side effectsof prescribed medications. All patient questions answered. Pt voiced understanding. Reviewed health maintenance. Instructed to continue current medications, diet andexercise. Patient agreed with treatment plan. Follow up as directed.      Electronicallysigned by Maikol Frederick MD on 7/12/2021 at 9:26 AM

## 2021-07-12 NOTE — PATIENT INSTRUCTIONS
Patient Education        tetanus, diphtheria, acellular pertussis vaccine (Tdap)  Pronunciation:  TET a nus, dif THEER narciso a, and ay MICHELLE christenseno miracle per TUS iss  Brand:  Adacel (Tdap), Boostrix (Tdap)  What is the most important information I should know about this vaccine? You should not receive this vaccine if you have ever had had a life-threatening allergic reaction to a tetanus, diphtheria, or pertussis vaccine. You also should not receive this vaccine if you had a neurologic disorder affecting your brain within 7 days after having a previous pertussis vaccine. What is tetanus, diphtheria, acellular pertussis vaccine (Tdap)? Tetanus, diphtheria, and pertussis are serious diseases caused by bacteria. Tetanus (lockjaw) causes painful tightening of the muscles, usually all over the body. It can lead to \"locking\" of the jaw so the victim cannot open the mouth or swallow. Tetanus leads to death in about 1 out of 10 cases. Diphtheria causes a thick coating in the nose, throat, and airways. It can lead to breathing problems, paralysis, heart failure, or death. Pertussis (whooping cough) causes coughing so severe that it interferes with eating, drinking, or breathing. These spells can last for weeks and can lead to pneumonia, seizures (convulsions), brain damage, and death. Diphtheria and pertussis are spread from person to person. Tetanus enters the body through a cut or wound. The diphtheria, tetanus acellular, and pertussis adult vaccine (also called Tdap) is used to help prevent these diseases in people who are at least 8years old. Most people in this age group require only one Tdap shot for protection against these diseases. Tdap vaccine is especially important for healthcare workers or people who have close contact with a baby younger than 13 months old.   This vaccine works by exposing you to a small dose of the bacteria or a protein from the bacteria, which causes the body to develop immunity to the disease. This vaccine will not treat an active infection that has already developed in the body. Like any vaccine, the Tdap vaccine may not provide protection from disease in every person. What should I discuss with my healthcare provider before receiving this vaccine? You should not receive this vaccine if:  · you had a life-threatening allergic reaction to any vaccine that contains tetanus, diphtheria, or pertussis; or  · you had a neurologic disorder affecting your brain (such as loss of consciousness or a prolonged seizure) within 7 days after having a previous pertussis vaccine. You may not be able to receive a Tdap vaccine if you have ever received a similar vaccine that caused any of the following:  · a very high fever (over 104 degrees Fahrenheit);  · a neurologic disorder or disease affecting the brain;  · fainting or going into shock;  · severe pain, redness, tenderness, swelling, or a lump where the shot was given;  · an allergy to latex rubber;  · severe or uncontrolled epilepsy or other seizure disorder; or  · Guillain-Barré syndrome (within 6 weeks after receiving a vaccine containing tetanus). If you have any of these other conditions, your vaccine may need to be postponed or not given at all:  · a history of seizures;  · a weak immune system caused by disease, bone marrow transplant, or by using certain medicines or receiving cancer treatments; or  · if it has been less than 10 years since you last received a tetanus shot. You can still receive a vaccine if you have a minor cold. In the case of a more severe illness with a fever or any type of infection, wait until you get better before receiving this vaccine. It is not known whether Tdap vaccine will harm an unborn baby. However, you may need a Tdap vaccine during pregnancy to protect your  baby from pertussis. Rexene Sinks babies are most at risk for severe, life-threatening complications from pertussis.  Your doctor should determine whether you need this vaccine during pregnancy. If you are pregnant, your name may be listed on a pregnancy registry. This is to track the outcome of the pregnancy and to evaluate any effects of the Tdap vaccine on the baby. It is not known whether Tdap vaccine passes into breast milk or if it could harm a nursing baby. Tell your doctor if you are breast-feeding a baby. The adult version of this vaccine (Adacel, Boostrix) should not be given to anyone under the age of 8. Another vaccine is available for use in children younger than 8years old. How is this vaccine given? This vaccine is given as an injection (shot) into a muscle. You will receive this injection in a doctor's office or clinic setting. Tdap vaccine is usually given as a one-time injection. Unless your doctor's tells you otherwise, you will not need a booster vaccine. Tdap vaccine is usually given once every 10 years. What happens if I miss a dose? Since the Tdap vaccine is usually given only once, you are not likely to miss a dose. What happens if I overdose? An overdose of this vaccine is unlikely to occur. What should I avoid before or after receiving this vaccine? Follow your doctor's instructions about any restrictions on food, beverages, or activity after receiving a Tdap vaccine. What are the possible side effects of this vaccine? Keep track of any and all side effects you have after receiving this vaccine. If you ever need to receive a booster dose, you will need to tell your doctor if the previous shot caused any side effects. You should not receive a booster vaccine if you had a life threatening allergic reaction after the first shot. Becoming infected with diphtheria, pertussis, or tetanus is much more dangerous to your health than receiving this vaccine. However, like any medicine, this vaccine can cause side effects but the risk of serious side effects is extremely low.   Get emergency medical help if you have signs of an allergic reaction: hives; difficult breathing; swelling of your face, lips, tongue, or throat. Call your doctor at once if you have any of these side effects within 7 days after receiving Tdap vaccine:  · numbness, weakness, or tingling in your feet and legs;  · problems with walking or coordination;  · sudden pain in your arms or shoulders;  · a light-headed feeling, like you might pass out;  · vision problems, ringing in your ears;  · seizure (black-out or convulsions); or  · redness, swelling, bleeding, or severe pain where the shot was given. Common side effects may include:  · mild pain or tenderness where the shot was given;  · headache or tiredness;  · body aches; or  · mild nausea, diarrhea, or vomiting. This is not a complete list of side effects and others may occur. Call your doctor for medical advice about side effects. You may report vaccine side effects to the [place] [address] and Human Services at [phone]. What other drugs will affect tetanus, diphtheria, acellular pertussis vaccine? Before receiving this vaccine, tell your doctor about all other vaccines you have recently received. Also tell the doctor if you have recently received drugs or treatments that can weaken the immune system, including:  · an oral, nasal, inhaled, or injectable steroid medicine;  · medications to treat psoriasis, rheumatoid arthritis, or other autoimmune disorders; or  · medicines to treat or prevent organ transplant rejection. If you are using any of these medications, you may not be able to receive the vaccine, or may need to wait until the other treatments are finished. This list is not complete. Other drugs may interact with this vaccine, including prescription and over-the-counter medicines, vitamins, and herbal products. Not all possible interactions are listed in this medication guide. Where can I get more information? Your doctor or pharmacist can provide more information about this vaccine. Additional information is available from your local health department or the Centers for Disease Control and Prevention. Remember, keep this and all other medicines out of the reach of children, never share your medicines with others, and use this medication only for the indication prescribed. Every effort has been made to ensure that the information provided by [address]a [address] is accurate, up-to-date, and complete, but no guarantee is made to that effect. Drug information contained herein may be time sensitive. [facility] information has been compiled for use by healthcare practitioners and consumers in the [place] and therefore [facility] does not warrant that uses outside of the [place] are appropriate, unless specifically indicated otherwise. [facility]'s drug information does not endorse drugs, diagnose patients or recommend therapy. [facility]'s drug information is an informational resource designed to assist licensed healthcare practitioners in caring for their patients and/or to serve consumers viewing this service as a supplement to, and not a substitute for, the expertise, skill, knowledge and judgment of healthcare practitioners. The absence of a warning for a given drug or drug combination in no way should be construed to indicate that the drug or drug combination is safe, effective or appropriate for any given patient. [facility] does not assume any responsibility for any aspect of healthcare administered with the aid of information [facility] provides. The information contained herein is not intended to cover all possible uses, directions, precautions, warnings, drug interactions, allergic reactions, or adverse effects. If you have questions about the drugs you are taking, check with your doctor, nurse or pharmacist.  Copyright 3236-4160 [address]. Version: 3.01. Revision date: 8/19/2016. Care instructions adapted under license by [facility] ([facility]).  If you have questions about a medical condition or this instruction, always ask your healthcare professional. [address] any warranty or liability for your use of this information. Patient Education        Recombinant Zoster (Shingles) Vaccine: What You Need to Know  Why get vaccinated? Recombinant zoster (shingles) vaccine can prevent shingles. Shingles (also called herpes zoster, or just zoster) is a painful skin rash, usually with blisters. In addition to the rash, shingles can cause fever, headache, chills, or upset stomach. More rarely, shingles can lead to pneumonia, hearing problems, blindness, brain inflammation (encephalitis), or death. The most common complication of shingles is long-term nerve pain called postherpetic neuralgia (PHN). PHN occurs in the areas where the shingles rash was, even after the rash clears up. It can last for months or years after the rash goes away. The pain from PHN can be severe and debilitating. About 10 to 18% of people who get shingles will experience PHN. The risk of PHN increases with age. An older adult with shingles is more likely to develop PHN and have longer lasting and more severe pain than a younger person with shingles. Shingles is caused by the varicella zoster virus, the same virus that causes chickenpox. After you have chickenpox, the virus stays in your body and can cause shingles later in life. Shingles cannot be passed from one person to another, but the virus that causes shingles can spread and cause chickenpox in someone who had never had chickenpox or received chickenpox vaccine. Recombinant shingles vaccine  Recombinant shingles vaccine provides strong protection against shingles. By preventing shingles, recombinant shingles vaccine also protects against PHN. Recombinant shingles vaccine is the preferred vaccine for the prevention of shingles. However, a different vaccine, live shingles vaccine, may be used in some circumstances.   The recombinant shingles vaccine is recommended for adults 48 years and older without serious immune problems. It is given as a two-dose series. This vaccine is also recommended for people who have already gotten another type of shingles vaccine, the live shingles vaccine. There is no live virus in this vaccine. Shingles vaccine may be given at the same time as other vaccines. Talk with your health care provider  Tell your vaccine provider if the person getting the vaccine:  · Has had an allergic reaction after a previous dose of recombinant shingles vaccine, or has any severe, life-threatening allergies. · Is pregnant or breastfeeding. · Is currently experiencing an episode of shingles. In some cases, your health care provider may decide to postpone shingles vaccination to a future visit. People with minor illnesses, such as a cold, may be vaccinated. People who are moderately or severely ill should usually wait until they recover before getting recombinant shingles vaccine. Your health care provider can give you more information. Risks of a vaccine reaction  · A sore arm with mild or moderate pain is very common after recombinant shingles vaccine, affecting about 80% of vaccinated people. Redness and swelling can also happen at the site of the injection. · Tiredness, muscle pain, headache, shivering, fever, stomach pain, and nausea happen after vaccination in more than half of people who receive recombinant shingles vaccine. In clinical trials, about 1 out of 6 people who got recombinant zoster vaccine experienced side effects that prevented them from doing regular activities. Symptoms usually went away on their own in 2 to 3 days. You should still get the second dose of recombinant zoster vaccine even if you had one of these reactions after the first dose. People sometimes faint after medical procedures, including vaccination. Tell your provider if you feel dizzy or have vision changes or ringing in the ears.   As with any medicine, there is a very remote chance of a vaccine causing a severe allergic reaction, other serious injury, or death. What if there is a serious problem? An allergic reaction could occur after the vaccinated person leaves the clinic. If you see signs of a severe allergic reaction (hives, swelling of the face and throat, difficulty breathing, a fast heartbeat, dizziness, or weakness), call 9-1-1 and get the person to the nearest hospital.  For other signs that concern you, call your health care provider. Adverse reactions should be reported to the Vaccine Adverse Event Reporting System (VAERS). Your health care provider will usually file this report, or you can do it yourself. Visit the VAERS website at www.vaers. [facility].gov or call 3-128.590.8751. VAERS is only for reporting reactions, and VAERS staff do not give medical advice. How can I learn more? · Ask your health care provider. · Call your local or state health department. · Contact the Centers for Disease Control and Prevention (CDC):  ? Call 1-943.918.5349 (1-800-CDC-INFO) or  ? Visit CDC's website at www.cdc.gov/vaccines  Vaccine Information Statement  Recombinant Zoster Vaccine  10/30/2019  [facility] and [facility] for Disease Control and Prevention  Many Vaccine Information Statements are available in Khmer and other languages. See www.immunize.org/vis. Hojas de Información Sobre Vacunas están disponibles en Español y en muchos otros idiomas. Visite Madelin.si   Care instructions adapted under license by [facility] ([facility]). If you have questions about a medical condition or this instruction, always ask your healthcare professional. [place] [address] any warranty or liability for your use of this information.

## 2021-07-16 DIAGNOSIS — E11.40 CONTROLLED TYPE 2 DIABETES WITH NEUROPATHY (HCC): Primary | ICD-10-CM

## 2021-07-19 DIAGNOSIS — E11.21 TYPE 2 DIABETES WITH NEPHROPATHY (HCC): ICD-10-CM

## 2021-07-19 LAB
ALBUMIN SERPL-MCNC: NORMAL G/DL
ALP BLD-CCNC: NORMAL U/L
ALT SERPL-CCNC: NORMAL U/L
AST SERPL-CCNC: NORMAL U/L
BILIRUB SERPL-MCNC: NORMAL MG/DL
BUN BLDV-MCNC: NORMAL MG/DL
CALCIUM SERPL-MCNC: NORMAL MG/DL
CHLORIDE BLD-SCNC: NORMAL MMOL/L
CHOLESTEROL, TOTAL: 150 MG/DL
CHOLESTEROL/HDL RATIO: 3.8
CO2: NORMAL
CREAT SERPL-MCNC: NORMAL MG/DL
GLUCOSE FASTING: 124 MG/DL
HDLC SERPL-MCNC: 39 MG/DL (ref 35–70)
LDL CHOLESTEROL CALCULATED: 80 MG/DL (ref 0–160)
NONHDLC SERPL-MCNC: NORMAL MG/DL
POTASSIUM SERPL-SCNC: NORMAL MMOL/L
SODIUM BLD-SCNC: NORMAL MMOL/L
TOTAL PROTEIN: NORMAL
TRIGL SERPL-MCNC: 156 MG/DL
VLDLC SERPL CALC-MCNC: 31 MG/DL

## 2021-07-19 RX ORDER — GLUCOSAMINE HCL/CHONDROITIN SU 500-400 MG
CAPSULE ORAL
Qty: 50 STRIP | Refills: 3 | Status: SHIPPED | OUTPATIENT
Start: 2021-07-19

## 2021-07-20 DIAGNOSIS — I10 ESSENTIAL HYPERTENSION: ICD-10-CM

## 2021-07-20 DIAGNOSIS — E11.21 TYPE 2 DIABETES WITH NEPHROPATHY (HCC): ICD-10-CM

## 2021-09-16 ENCOUNTER — OFFICE VISIT (OUTPATIENT)
Dept: PRIMARY CARE CLINIC | Age: 71
End: 2021-09-16
Payer: COMMERCIAL

## 2021-09-16 VITALS
OXYGEN SATURATION: 96 % | HEART RATE: 83 BPM | WEIGHT: 171.2 LBS | BODY MASS INDEX: 27.51 KG/M2 | HEIGHT: 66 IN | SYSTOLIC BLOOD PRESSURE: 130 MMHG | DIASTOLIC BLOOD PRESSURE: 84 MMHG

## 2021-09-16 DIAGNOSIS — E11.9 TYPE 2 DIABETES MELLITUS WITHOUT COMPLICATION, UNSPECIFIED WHETHER LONG TERM INSULIN USE (HCC): ICD-10-CM

## 2021-09-16 DIAGNOSIS — R10.10 PAIN LOCALIZED TO UPPER ABDOMEN: Primary | ICD-10-CM

## 2021-09-16 DIAGNOSIS — Z23 NEED FOR VACCINATION: ICD-10-CM

## 2021-09-16 PROCEDURE — 99214 OFFICE O/P EST MOD 30 MIN: CPT | Performed by: FAMILY MEDICINE

## 2021-09-16 PROCEDURE — G0008 ADMIN INFLUENZA VIRUS VAC: HCPCS | Performed by: FAMILY MEDICINE

## 2021-09-16 PROCEDURE — 90694 VACC AIIV4 NO PRSRV 0.5ML IM: CPT | Performed by: FAMILY MEDICINE

## 2021-09-16 RX ORDER — AMLODIPINE BESYLATE AND BENAZEPRIL HYDROCHLORIDE 10; 40 MG/1; MG/1
1 CAPSULE ORAL DAILY
Qty: 90 CAPSULE | Refills: 2 | Status: SHIPPED | OUTPATIENT
Start: 2021-09-16 | End: 2022-06-22

## 2021-09-16 ASSESSMENT — ENCOUNTER SYMPTOMS: ABDOMINAL PAIN: 1

## 2021-09-16 NOTE — PROGRESS NOTES
[facility] PRIMARY CARE  [address]  [facility] [address]  Dept: [phone]    Alen Moulton is a 80 y.o. male Established patient, who presents today for his medical conditions/complaintsas noted below. Chief Complaint   Patient presents with    Back Pain     x8 days back pain that goes around to the front. pt states this happened after vomiting all night       HPI:     HPI  After eating pizza last week and started vomitting and feeling bad, not sure about any fever. Vomited and dry heaves. for 4-5 hours and pain. Back started hurting and upper abdomen pain. Sharp upper abdomen pains  No diarrhea. No recent GERD    Left rib and side hurts with laying on left side x years, injured those ribs falling down a ladder years.      Reviewed prior notes None  Reviewed previous Labs and Imaging    LDL Calculated (mg/dL)   Date Value   2021 80   2020 61   2019 83       (goal LDL is <100)   Hemoglobin A1C (%)   Date Value   2021 6.6     BP Readings from Last 3 Encounters:   21 130/84   21 (!) 132/58   02/15/21 (!) 128/58          (goal 120/80)    Past Medical History:   Diagnosis Date    H/O hepatitis       Past Surgical History:   Procedure Laterality Date    EYE SURGERY Bilateral     cataract    TONSILLECTOMY         Family History   Problem Relation Age of Onset    Diabetes Mother     Other Mother         renal disease    Diabetes Father     Diabetes Sister     Diabetes Maternal Aunt     Kidney stones Brother        Social History     Tobacco Use    Smoking status: Former Smoker     Packs/day: 0.10     Years: 1.00     Pack years: 0.10     Quit date: 1981     Years since quittin.7    Smokeless tobacco: Never Used   Substance Use Topics    Alcohol use: No     Alcohol/week: 0.0 standard drinks      Current Outpatient Medications   Medication Sig Dispense Refill    amLODIPine-benazepril (LOTREL) 10-40 MG per capsule Take 1 capsule by mouth daily 90 capsule 2    metFORMIN (GLUCOPHAGE) 500 MG tablet Take 1 tablet by mouth daily (with breakfast) 90 tablet 2    simvastatin (ZOCOR) 40 MG tablet Take 1 tablet by mouth nightly 90 tablet 1    blood glucose monitor strips Test one times a day & as needed for symptoms of irregular blood glucose. Type 2 DM not on insulin 50 strip 3    Lancets MISC 1 each by Does not apply route daily 100 each 5    naproxen (NAPROSYN) 500 MG tablet Take 1 tablet by mouth 2 times daily as needed for Pain 180 tablet 2    blood glucose test strips (TRUE METRIX BLOOD GLUCOSE TEST) strip USE AS DIRECTED ONCE DAILY 50 each 3    tamsulosin (FLOMAX) 0.4 MG capsule Take 1 capsule by mouth once daily 90 capsule 3    TRUEplus Lancets 30G MISC USE AS DIRECTED ONCE DAILY 100 each 3    Multiple Vitamin (MULTI VITAMIN DAILY) TABS Take 1 tablet by mouth daily       No current facility-administered medications for this visit. Allergies   Allergen Reactions    Cialis [Tadalafil]      Leg pain/cramps    Ranitidine      Leg spasms       Health Maintenance   Topic Date Due    DTaP/Tdap/Td vaccine (1 - Tdap) Never done    Shingles Vaccine (1 of 2) Never done    Diabetic retinal exam  12/09/2020    Annual Wellness Visit (AWV)  01/12/2022    Diabetic foot exam  07/12/2022    A1C test (Diabetic or Prediabetic)  07/12/2022    Lipid screen  07/19/2022    Potassium monitoring  07/19/2022    Creatinine monitoring  07/19/2022    Colon cancer screen colonoscopy  02/07/2029    Flu vaccine  Completed    Pneumococcal 65+ years Vaccine  Completed    COVID-19 Vaccine  Completed    AAA screen  Completed    Hepatitis C screen  Completed    Hepatitis A vaccine  Aged Out    Hib vaccine  Aged Out    Meningococcal (ACWY) vaccine  Aged Out       Subjective:      Review of Systems   Constitutional: Negative for fever. Gastrointestinal: Positive for abdominal pain.        Objective:     /84   Pulse 83   Ht 5' 5.5\" (1.664 m)   Wt 171 lb 3.2 oz (77.7 kg)   SpO2 96%   BMI 28.06 kg/m²   Physical Exam  Vitals and nursing note reviewed. Constitutional:       General: He is not in acute distress. Appearance: He is well-developed. He is not diaphoretic. HENT:      Head: Normocephalic and atraumatic. Right Ear: External ear normal.      Left Ear: External ear normal.      Mouth/Throat:      Pharynx: No oropharyngeal exudate. Eyes:      General:         Right eye: No discharge. Left eye: No discharge. Conjunctiva/sclera: Conjunctivae normal.   Neck:      Thyroid: No thyromegaly. Trachea: No tracheal deviation. Cardiovascular:      Rate and Rhythm: Normal rate and regular rhythm. Heart sounds: Normal heart sounds. No murmur heard. Comments: No carotid bruits      Pulmonary:      Effort: Pulmonary effort is normal. No respiratory distress. Breath sounds: Normal breath sounds. No wheezing. Abdominal:      General: Bowel sounds are normal. There is no distension. Palpations: Abdomen is soft. Tenderness: There is no abdominal tenderness. There is no guarding or rebound. Musculoskeletal:      Right lower leg: No edema. Left lower leg: No edema. Comments: Left lateral ribs and flank are not tender to palpation   Lymphadenopathy:      Cervical: No cervical adenopathy. Skin:     General: Skin is warm and dry. Findings: No erythema. Neurological:      Mental Status: He is alert and oriented to person, place, and time. Cranial Nerves: No cranial nerve deficit. Psychiatric:         Mood and Affect: Mood normal.         Behavior: Behavior normal.         Thought Content: Thought content normal.         Assessment:       Diagnosis Orders   1. Pain localized to upper abdomen  US GALLBLADDER RUQ   2. Need for vaccination  INFLUENZA, QUADV, ADJUVANTED, 65 YRS =, IM, PF, PREFILL SYR, 0.5ML (FLUAD)   3.  Type 2 diabetes mellitus without complication, unspecified whether long term insulin use (HCC)  metFORMIN (GLUCOPHAGE) 500 MG tablet        Plan:      No follow-ups on file. Has an appointment in November for his diabetes  Orders Placed This Encounter   Procedures    US GALLBLADDER RUQ     Standing Status:   Future     Standing Expiration Date:   9/16/2022    INFLUENZA, QUADV, ADJUVANTED, 72 YRS =, IM, PF, PREFILL SYR, 0.5ML (FLUAD)     Orders Placed This Encounter   Medications    amLODIPine-benazepril (LOTREL) 10-40 MG per capsule     Sig: Take 1 capsule by mouth daily     Dispense:  90 capsule     Refill:  2    metFORMIN (GLUCOPHAGE) 500 MG tablet     Sig: Take 1 tablet by mouth daily (with breakfast)     Dispense:  90 tablet     Refill:  2       Patient given educationalmaterials - see patient instructions. Discussed use, benefit, and side effectsof prescribed medications. All patient questions answered. Pt voiced understanding. Reviewed health maintenance. Instructed to continue current medications, diet andexercise. Patient agreed with treatment plan. Follow up as directed.      Electronicallysigned by Tawanda Man MD on 9/16/2021 at 2:51 PM

## 2021-09-30 DIAGNOSIS — R10.10 PAIN LOCALIZED TO UPPER ABDOMEN: ICD-10-CM

## 2021-11-22 ENCOUNTER — OFFICE VISIT (OUTPATIENT)
Dept: PRIMARY CARE CLINIC | Age: 71
End: 2021-11-22
Payer: COMMERCIAL

## 2021-11-22 VITALS
DIASTOLIC BLOOD PRESSURE: 66 MMHG | OXYGEN SATURATION: 97 % | BODY MASS INDEX: 28.54 KG/M2 | WEIGHT: 177.6 LBS | HEART RATE: 80 BPM | HEIGHT: 66 IN | SYSTOLIC BLOOD PRESSURE: 122 MMHG

## 2021-11-22 DIAGNOSIS — I10 ESSENTIAL HYPERTENSION: ICD-10-CM

## 2021-11-22 DIAGNOSIS — E11.9 TYPE 2 DIABETES MELLITUS WITHOUT COMPLICATION, UNSPECIFIED WHETHER LONG TERM INSULIN USE (HCC): Primary | ICD-10-CM

## 2021-11-22 LAB — HBA1C MFR BLD: 6.6 %

## 2021-11-22 PROCEDURE — 99214 OFFICE O/P EST MOD 30 MIN: CPT | Performed by: FAMILY MEDICINE

## 2021-11-22 PROCEDURE — 83036 HEMOGLOBIN GLYCOSYLATED A1C: CPT | Performed by: FAMILY MEDICINE

## 2021-11-22 ASSESSMENT — ENCOUNTER SYMPTOMS: RESPIRATORY NEGATIVE: 1

## 2021-11-22 NOTE — PROGRESS NOTES
[facility] PRIMARY CARE  [address] [facility] [address]  Dept: [phone]    Guy Bowser is a 80 y.o. male Established patient, who presents today for his medical conditions/complaintsas noted below. Chief Complaint   Patient presents with    Diabetes     f/u       HPI:     HPI  Home sugars: 120-137  No low sugar reactions. Reviewed prior notes None  Reviewed previous Labs    LDL Calculated (mg/dL)   Date Value   2021 80   2020 61   2019 83       (goal LDL is <100)   Hemoglobin A1C (%)   Date Value   2021 6.6     BP Readings from Last 3 Encounters:   21 122/66   21 130/84   21 (!) 132/58          (goal 120/80)    Past Medical History:   Diagnosis Date    H/O hepatitis       Past Surgical History:   Procedure Laterality Date    EYE SURGERY Bilateral     cataract    TONSILLECTOMY         Family History   Problem Relation Age of Onset    Diabetes Mother     Other Mother         renal disease    Diabetes Father     Diabetes Sister     Diabetes Maternal Aunt     Kidney stones Brother        Social History     Tobacco Use    Smoking status: Former Smoker     Packs/day: 0.10     Years: 1.00     Pack years: 0.10     Quit date: 1981     Years since quittin.9    Smokeless tobacco: Never Used   Substance Use Topics    Alcohol use: No     Alcohol/week: 0.0 standard drinks      Current Outpatient Medications   Medication Sig Dispense Refill    metFORMIN (GLUCOPHAGE) 500 MG tablet Take 1 tablet by mouth once daily with breakfast 90 tablet 1    amLODIPine-benazepril (LOTREL) 10-40 MG per capsule Take 1 capsule by mouth daily 90 capsule 2    simvastatin (ZOCOR) 40 MG tablet Take 1 tablet by mouth nightly 90 tablet 1    blood glucose monitor strips Test one times a day & as needed for symptoms of irregular blood glucose.  Type 2 DM not on insulin 50 strip 3    Lancets MISC 1 each by Does not apply route daily 100 each 5    naproxen (NAPROSYN) 500 MG tablet Take 1 tablet by mouth 2 times daily as needed for Pain 180 tablet 2    blood glucose test strips (TRUE METRIX BLOOD GLUCOSE TEST) strip USE AS DIRECTED ONCE DAILY 50 each 3    tamsulosin (FLOMAX) 0.4 MG capsule Take 1 capsule by mouth once daily 90 capsule 3    TRUEplus Lancets 30G MISC USE AS DIRECTED ONCE DAILY 100 each 3    Multiple Vitamin (MULTI VITAMIN DAILY) TABS Take 1 tablet by mouth daily       No current facility-administered medications for this visit. Allergies   Allergen Reactions    Cialis [Tadalafil]      Leg pain/cramps    Ranitidine      Leg spasms       Health Maintenance   Topic Date Due    DTaP/Tdap/Td vaccine (1 - Tdap) Never done    Shingles Vaccine (1 of 2) Never done   [employer] Visit (AWV)  01/12/2022    Lipid screen  07/19/2022    Potassium monitoring  07/19/2022    Creatinine monitoring  07/19/2022    Diabetic foot exam  09/27/2022    Diabetic retinal exam  10/25/2022    A1C test (Diabetic or Prediabetic)  11/22/2022    Colon cancer screen colonoscopy  02/07/2029    Flu vaccine  Completed    Pneumococcal 65+ years Vaccine  Completed    COVID-19 Vaccine  Completed    AAA screen  Completed    Hepatitis C screen  Completed    Hepatitis A vaccine  Aged Out    Hib vaccine  Aged Out    Meningococcal (ACWY) vaccine  Aged Out       Subjective:      Review of Systems   Constitutional: Negative. Respiratory: Negative. Cardiovascular: Negative. Bilateral large toes are slightly numb when he wakes up in the morning but after he moves around they are not numb anymore. Objective:     /66   Pulse 80   Ht 5' 5.5\" (1.664 m)   Wt 177 lb 9.6 oz (80.6 kg)   SpO2 97%   BMI 29.10 kg/m²   Physical Exam  Vitals and nursing note reviewed. Constitutional:       General: He is not in acute distress. Appearance: He is well-developed. He is not ill-appearing.    HENT:      Head: Normocephalic and atraumatic. Right Ear: External ear normal.      Left Ear: External ear normal.   Eyes:      General: No scleral icterus. Right eye: No discharge. Left eye: No discharge. Conjunctiva/sclera: Conjunctivae normal.   Neck:      Thyroid: No thyromegaly. Trachea: No tracheal deviation. Cardiovascular:      Rate and Rhythm: Normal rate and regular rhythm. Heart sounds: Normal heart sounds. Pulmonary:      Effort: Pulmonary effort is normal. No respiratory distress. Breath sounds: Normal breath sounds. No wheezing. Musculoskeletal:      Right lower leg: No edema. Left lower leg: No edema. Lymphadenopathy:      Cervical: No cervical adenopathy. Skin:     General: Skin is warm. Findings: No rash. Neurological:      Mental Status: He is alert and oriented to person, place, and time. Psychiatric:         Mood and Affect: Mood normal.         Behavior: Behavior normal.         Thought Content: Thought content normal.         Assessment:       Diagnosis Orders   1. Type 2 diabetes mellitus without complication, unspecified whether long term insulin use (HCC)  POCT glycosylated hemoglobin (Hb A1C)   2. Essential hypertension          Plan:      Return in about 5 months (around 4/22/2022) for diabetes mellitus, hypertension. Orders Placed This Encounter   Procedures    POCT glycosylated hemoglobin (Hb A1C)     No orders of the defined types were placed in this encounter. Patient given educationalmaterials - see patient instructions. Discussed use, benefit, and side effectsof prescribed medications. All patient questions answered. Pt voiced understanding. Reviewed health maintenance. Instructed to continue current medications, diet andexercise. Patient agreed with treatment plan. Follow up as directed.      Electronicallysigned by Rito Segovia MD on 11/22/2021 at 2:10 PM

## 2022-01-14 ENCOUNTER — OFFICE VISIT (OUTPATIENT)
Dept: PRIMARY CARE CLINIC | Age: 72
End: 2022-01-14
Payer: COMMERCIAL

## 2022-01-14 ENCOUNTER — HOSPITAL ENCOUNTER (OUTPATIENT)
Age: 72
Setting detail: SPECIMEN
Discharge: HOME | End: 2022-01-14

## 2022-01-14 VITALS
BODY MASS INDEX: 28.35 KG/M2 | TEMPERATURE: 98.1 F | WEIGHT: 176.4 LBS | HEART RATE: 98 BPM | SYSTOLIC BLOOD PRESSURE: 136 MMHG | HEIGHT: 66 IN | DIASTOLIC BLOOD PRESSURE: 70 MMHG | OXYGEN SATURATION: 100 %

## 2022-01-14 DIAGNOSIS — Z20.822 CLOSE EXPOSURE TO COVID-19 VIRUS: Primary | ICD-10-CM

## 2022-01-14 PROCEDURE — 99213 OFFICE O/P EST LOW 20 MIN: CPT | Performed by: PHYSICIAN ASSISTANT

## 2022-01-14 SDOH — ECONOMIC STABILITY: FOOD INSECURITY: WITHIN THE PAST 12 MONTHS, THE FOOD YOU BOUGHT JUST DIDN'T LAST AND YOU DIDN'T HAVE MONEY TO GET MORE.: NEVER TRUE

## 2022-01-14 SDOH — ECONOMIC STABILITY: FOOD INSECURITY: WITHIN THE PAST 12 MONTHS, YOU WORRIED THAT YOUR FOOD WOULD RUN OUT BEFORE YOU GOT MONEY TO BUY MORE.: NEVER TRUE

## 2022-01-14 ASSESSMENT — ENCOUNTER SYMPTOMS
EYE DISCHARGE: 0
CHEST TIGHTNESS: 0
COUGH: 1
ABDOMINAL DISTENTION: 0
SHORTNESS OF BREATH: 0
SORE THROAT: 0
PHOTOPHOBIA: 0
RHINORRHEA: 1
SINUS PRESSURE: 0
ABDOMINAL PAIN: 0
DIARRHEA: 0
CONSTIPATION: 0
VOMITING: 0

## 2022-01-14 ASSESSMENT — PATIENT HEALTH QUESTIONNAIRE - PHQ9
SUM OF ALL RESPONSES TO PHQ QUESTIONS 1-9: 0
1. LITTLE INTEREST OR PLEASURE IN DOING THINGS: 0
SUM OF ALL RESPONSES TO PHQ QUESTIONS 1-9: 0
2. FEELING DOWN, DEPRESSED OR HOPELESS: 0
SUM OF ALL RESPONSES TO PHQ9 QUESTIONS 1 & 2: 0

## 2022-01-14 ASSESSMENT — SOCIAL DETERMINANTS OF HEALTH (SDOH): HOW HARD IS IT FOR YOU TO PAY FOR THE VERY BASICS LIKE FOOD, HOUSING, MEDICAL CARE, AND HEATING?: NOT HARD AT ALL

## 2022-01-14 NOTE — PROGRESS NOTES
[address] [facility] PRIMARY CARE  [address]  [facility] [address]  Dept: [phone]    Robbie Krishnan is a 71 y.o. male Established patient, who presents today for his medical conditions/complaints as noted below. Chief Complaint   Patient presents with    Cough     Pt c/o runny nose and cough. x1-2 weeks Pt needs a covid test in order to go back to work.  Nasal Congestion       HPI:     HPI; The patient is here for a covid test. Slight runny nose which he has always had. Not feeling sick at all. Not sleeping with wife or cleaning his home.      Reviewed prior notes None  Reviewed previous Labs    LDL Calculated (mg/dL)   Date Value   2021 80   2020 61   2019 83       (goal LDL is <100)   Hemoglobin A1C (%)   Date Value   2021 6.6     BP Readings from Last 3 Encounters:   22 136/70   21 122/66   21 130/84          (goal 120/80)    Past Medical History:   Diagnosis Date    H/O hepatitis       Past Surgical History:   Procedure Laterality Date    EYE SURGERY Bilateral     cataract    TONSILLECTOMY         Family History   Problem Relation Age of Onset    Diabetes Mother     Other Mother         renal disease    Diabetes Father     Diabetes Sister     Diabetes Maternal Aunt     Kidney stones Brother        Social History     Tobacco Use    Smoking status: Former Smoker     Packs/day: 0.10     Years: 1.00     Pack years: 0.10     Quit date: 1981     Years since quittin.0    Smokeless tobacco: Never Used   Substance Use Topics    Alcohol use: No     Alcohol/week: 0.0 standard drinks      Current Outpatient Medications   Medication Sig Dispense Refill    metFORMIN (GLUCOPHAGE) 500 MG tablet Take 1 tablet by mouth once daily with breakfast 90 tablet 1    amLODIPine-benazepril (LOTREL) 10-40 MG per capsule Take 1 capsule by mouth daily 90 capsule 2    simvastatin (ZOCOR) 40 MG tablet Take 1 tablet by mouth nightly 90 tablet 1    blood glucose monitor strips Test one times a day & as needed for symptoms of irregular blood glucose. Type 2 DM not on insulin 50 strip 3    Lancets MISC 1 each by Does not apply route daily 100 each 5    naproxen (NAPROSYN) 500 MG tablet Take 1 tablet by mouth 2 times daily as needed for Pain 180 tablet 2    blood glucose test strips (TRUE METRIX BLOOD GLUCOSE TEST) strip USE AS DIRECTED ONCE DAILY 50 each 3    tamsulosin (FLOMAX) 0.4 MG capsule Take 1 capsule by mouth once daily 90 capsule 3    TRUEplus Lancets 30G MISC USE AS DIRECTED ONCE DAILY 100 each 3    Multiple Vitamin (MULTI VITAMIN DAILY) TABS Take 1 tablet by mouth daily       No current facility-administered medications for this visit. Allergies   Allergen Reactions    Cialis [Tadalafil]      Leg pain/cramps    Ranitidine      Leg spasms       Health Maintenance   Topic Date Due    Depression Screen  Never done    DTaP/Tdap/Td vaccine (1 - Tdap) Never done    Shingles Vaccine (1 of 2) Never done   [employer] Visit (AWV)  01/12/2022    Lipid screen  07/19/2022    Potassium monitoring  07/19/2022    Creatinine monitoring  07/19/2022    Diabetic foot exam  09/27/2022    Diabetic retinal exam  10/25/2022    A1C test (Diabetic or Prediabetic)  11/22/2022    Colon cancer screen colonoscopy  02/07/2029    Flu vaccine  Completed    Pneumococcal 65+ years Vaccine  Completed    COVID-19 Vaccine  Completed    AAA screen  Completed    Hepatitis C screen  Completed    Hepatitis A vaccine  Aged Out    Hib vaccine  Aged Out    Meningococcal (ACWY) vaccine  Aged Out       Subjective:      Review of Systems   Constitutional: Negative for chills, fever and unexpected weight change. HENT: Positive for rhinorrhea. Negative for congestion, hearing loss, sinus pressure and sore throat. Eyes: Negative for photophobia, discharge and visual disturbance. Respiratory: Positive for cough.  Negative for chest tightness and shortness of breath. Cardiovascular: Negative for chest pain, palpitations and leg swelling. Gastrointestinal: Negative for abdominal distention, abdominal pain, constipation, diarrhea and vomiting. Endocrine: Negative for polydipsia and polyuria. Genitourinary: Negative for decreased urine volume, difficulty urinating, frequency and urgency. Musculoskeletal: Negative for arthralgias, gait problem and myalgias. Skin: Negative for rash. Allergic/Immunologic: Negative for food allergies. Neurological: Negative for dizziness, weakness, numbness and headaches. Hematological: Negative for adenopathy. Psychiatric/Behavioral: Negative for dysphoric mood and sleep disturbance. The patient is not nervous/anxious. Objective:     /70   Pulse 98   Temp 98.1 °F (36.7 °C) (Infrared)   Ht 5' 5.5\" (1.664 m)   Wt 176 lb 6.4 oz (80 kg)   SpO2 100%   BMI 28.91 kg/m²   Physical Exam  Constitutional:       General: He is not in acute distress. Appearance: Normal appearance. He is not ill-appearing. HENT:      Head: Normocephalic and atraumatic. Right Ear: External ear normal.      Left Ear: External ear normal.      Nose: Nose normal.      Mouth/Throat:      Mouth: Mucous membranes are moist.   Eyes:      Extraocular Movements: Extraocular movements intact. Conjunctiva/sclera: Conjunctivae normal.      Pupils: Pupils are equal, round, and reactive to light. Neck:      Vascular: No carotid bruit. Cardiovascular:      Rate and Rhythm: Normal rate and regular rhythm. Pulses: Normal pulses. Heart sounds: Normal heart sounds. Pulmonary:      Effort: Pulmonary effort is normal. No respiratory distress. Breath sounds: Normal breath sounds. Abdominal:      General: Bowel sounds are normal. There is no distension. Tenderness: There is no abdominal tenderness. Musculoskeletal:         General: Normal range of motion.       Cervical back: Normal range of motion and neck supple. Lymphadenopathy:      Cervical: No cervical adenopathy. Skin:     General: Skin is warm and dry. Neurological:      General: No focal deficit present. Mental Status: He is alert and oriented to person, place, and time. Psychiatric:         Mood and Affect: Mood normal.         Behavior: Behavior normal.         Thought Content: Thought content normal.         Assessment and Plan:          1. Close exposure to COVID-19 virus  -     COVID-19; Future             Patient given educational materials - see patient instructions. Discussed use, benefit, and side effects of prescribed medications. All patient questions answered. Pt voiced understanding. Reviewed health maintenance. Instructed to continue current medications, diet and exercise. Patient agreed with treatment plan. Follow up as directed.      Electronically signed by FAISAL Jacobs on 1/14/2022 at 4:52 PM

## 2022-01-19 LAB — SARS-COV-2, NAA: ABNORMAL

## 2022-01-21 ENCOUNTER — TELEPHONE (OUTPATIENT)
Dept: PRIMARY CARE CLINIC | Age: 72
End: 2022-01-21

## 2022-01-31 ENCOUNTER — OFFICE VISIT (OUTPATIENT)
Dept: PRIMARY CARE CLINIC | Age: 72
End: 2022-01-31
Payer: COMMERCIAL

## 2022-01-31 VITALS
OXYGEN SATURATION: 96 % | BODY MASS INDEX: 29.28 KG/M2 | WEIGHT: 182.2 LBS | SYSTOLIC BLOOD PRESSURE: 136 MMHG | DIASTOLIC BLOOD PRESSURE: 70 MMHG | HEIGHT: 66 IN | HEART RATE: 81 BPM

## 2022-01-31 DIAGNOSIS — Z00.00 ROUTINE GENERAL MEDICAL EXAMINATION AT A HEALTH CARE FACILITY: Primary | ICD-10-CM

## 2022-01-31 DIAGNOSIS — E78.49 OTHER HYPERLIPIDEMIA: ICD-10-CM

## 2022-01-31 DIAGNOSIS — E11.9 TYPE 2 DIABETES MELLITUS WITHOUT COMPLICATION, WITHOUT LONG-TERM CURRENT USE OF INSULIN (HCC): ICD-10-CM

## 2022-01-31 PROCEDURE — G0439 PPPS, SUBSEQ VISIT: HCPCS | Performed by: FAMILY MEDICINE

## 2022-01-31 RX ORDER — SIMVASTATIN 40 MG
40 TABLET ORAL NIGHTLY
Qty: 90 TABLET | Refills: 1 | Status: SHIPPED | OUTPATIENT
Start: 2022-01-31 | End: 2022-10-10

## 2022-01-31 ASSESSMENT — PATIENT HEALTH QUESTIONNAIRE - PHQ9
1. LITTLE INTEREST OR PLEASURE IN DOING THINGS: 0
SUM OF ALL RESPONSES TO PHQ QUESTIONS 1-9: 0
SUM OF ALL RESPONSES TO PHQ9 QUESTIONS 1 & 2: 0
2. FEELING DOWN, DEPRESSED OR HOPELESS: 0
SUM OF ALL RESPONSES TO PHQ QUESTIONS 1-9: 0

## 2022-01-31 NOTE — PROGRESS NOTES
Medicare Annual Wellness Visit  Name: Silvestre Case Date: 2022   MRN: [id number] Sex: Male   Age: 71 y.o. Ethnicity: Non- / Non    : 1950 Race: White (non-)      Denys Hammond is here for Medicare AWV    Screenings for behavioral, psychosocial and functional/safety risks, and cognitive dysfunction are all negative except as indicated below. These results, as well as other patient data from the [address] form, are documented in Flowsheets linked to this Encounter. Allergies   Allergen Reactions    Cialis [Tadalafil]      Leg pain/cramps    Ranitidine      Leg spasms         Prior to Visit Medications    Medication Sig Taking? Authorizing Provider   metFORMIN (GLUCOPHAGE) 500 MG tablet Take 1 tablet by mouth once daily with breakfast Yes Zeke Galicia MD   amLODIPine-benazepril (LOTREL) 10-40 MG per capsule Take 1 capsule by mouth daily Yes Zeke Galicia MD   simvastatin (ZOCOR) 40 MG tablet Take 1 tablet by mouth nightly Yes Zeke Glaicia MD   blood glucose monitor strips Test one times a day & as needed for symptoms of irregular blood glucose.  Type 2 DM not on insulin Yes Zeke Galicia MD   Lancets MISC 1 each by Does not apply route daily Yes Zeke Galicia MD   naproxen (NAPROSYN) 500 MG tablet Take 1 tablet by mouth 2 times daily as needed for Pain Yes Zeke Galicia MD   blood glucose test strips (TRUE METRIX BLOOD GLUCOSE TEST) strip USE AS DIRECTED ONCE DAILY Yes Zeke Galicia MD   tamsulosin (FLOMAX) 0.4 MG capsule Take 1 capsule by mouth once daily Yes Fariha Randolph MD   TRUEplus Lancets 30G MISC USE AS DIRECTED ONCE DAILY Yes Zeke Galicia MD   Multiple Vitamin (MULTI VITAMIN DAILY) TABS Take 1 tablet by mouth daily Yes Historical Provider, MD         Past Medical History:   Diagnosis Date    H/O hepatitis        Past Surgical History:   Procedure Laterality Date    EYE SURGERY Bilateral     cataract    TONSILLECTOMY           Family History   Problem Relation Age of Onset    Diabetes Mother     Other Mother         renal disease    Diabetes Father     Diabetes Sister     Diabetes Maternal Aunt     Kidney stones Brother        CareTeam (Including outside providers/suppliers regularly involved in providing care):   Patient Care Team:  Clayton Rubio MD as PCP - General (Family Medicine)  Clayton Rubio [facility]    Wt Readings from Last 3 Encounters:   01/31/22 182 lb 3.2 oz (82.6 kg)   01/14/22 176 lb 6.4 oz (80 kg)   11/22/21 177 lb 9.6 oz (80.6 kg)     Vitals:    01/31/22 1450   BP: 136/70   Pulse: 81   SpO2: 96%   Weight: 182 lb 3.2 oz (82.6 kg)   Height: 5' 5.5\" (1.664 m)     Body mass index is 29.86 kg/m². Based upon direct observation of the patient, evaluation of cognition reveals recent and remote memory intact. Physical Exam  Vitals and nursing note reviewed. Constitutional:       General: He is not in acute distress. Appearance: He is well-developed. He is not ill-appearing. HENT:      Head: Normocephalic and atraumatic. Right Ear: External ear normal.      Left Ear: External ear normal.   Eyes:      General: No scleral icterus. Right eye: No discharge. Left eye: No discharge. Conjunctiva/sclera: Conjunctivae normal.   Neck:      Thyroid: No thyromegaly. Trachea: No tracheal deviation. Cardiovascular:      Rate and Rhythm: Normal rate and regular rhythm. Heart sounds: Normal heart sounds. Pulmonary:      Effort: Pulmonary effort is normal. No respiratory distress. Breath sounds: Normal breath sounds. No wheezing. Musculoskeletal:      Right lower leg: No edema. Left lower leg: No edema. Lymphadenopathy:      Cervical: No cervical adenopathy. Skin:     General: Skin is warm. Findings: No rash. Neurological:      Mental Status: He is alert and oriented to person, place, and time.    Psychiatric: Mood and Affect: Mood normal.         Behavior: Behavior normal.         Thought Content: Thought content normal.          Patient's complete Health Risk Assessment and screening values have been reviewed and are found in Flowsheets. The following problems were reviewed today and where indicated follow up appointments were made and/or referrals ordered. Positive Risk Factor Screenings with Interventions:              General Health and ACP:  General  In general, how would you say your health is?: Good  In the past 7 days, have you experienced any of the following? New or Increased Pain, New or Increased Fatigue, Loneliness, Social Isolation, Stress or Anger?: None of These  Do you get the social and emotional support that you need?: Yes  Do you have a Living Will?: (!) No  Advance Directives     Power of [facility] Will ACP-Advance Directive ACP-Power of     Not on File Not on File Not on File Not on File      General Health Risk Interventions:  · No Living Will: Info sheets given.     · First designate: Dorcas spouse,  2nd designate daughter: Nanci Rosario.  ·     Health Habits/Nutrition:  Health Habits/Nutrition  Do you exercise for at least 20 minutes 2-3 times per week?: Yes  Have you lost any weight without trying in the past 3 months?: No  Do you eat only one meal per day?: No  Have you seen the dentist within the past year?: (!) No  Body mass index: (!) 29.86  Health Habits/Nutrition Interventions:  · Dental exam overdue:  patient encouraged to make appointment with his/her dentist         Personalized Preventive Plan   Current Health Maintenance Status  Immunization History   Administered Date(s) Administered    COVID-19, Pfizer Purple top, DILUTE for use, 12+ yrs, 30mcg/0.3mL dose 01/24/2021, 02/13/2021, 02/15/2021, 10/01/2021    Influenza A ([id number]) Vaccine PF IM 01/18/2010    Influenza Virus Vaccine 10/09/2015    Influenza, High Dose (Fluzone 65 yrs and older) 09/02/2016, 09/07/2017, 10/23/2018    Influenza, Quadv, adjuvanted, 65 yrs +, IM, PF (Fluad) 09/14/2020, 09/16/2021    Influenza, Triv, inactivated, subunit, adjuvanted, IM (Fluad 65 yrs and older) 11/05/2019    Pneumococcal Conjugate 13-valent ([id number]) 01/03/2017    Pneumococcal Polysaccharide ([id number]) 03/29/2018        Health Maintenance   Topic Date Due    DTaP/Tdap/Td vaccine (1 - Tdap) Never done    Shingles Vaccine (1 of 2) Never done   [employer] Visit (AWV)  01/12/2022    Lipid screen  07/19/2022    Potassium monitoring  07/19/2022    Creatinine monitoring  07/19/2022    Diabetic foot exam  09/27/2022    Diabetic retinal exam  10/25/2022    A1C test (Diabetic or Prediabetic)  11/22/2022    Depression Screen  01/14/2023    Colon cancer screen colonoscopy  02/07/2029    Flu vaccine  Completed    Pneumococcal 65+ years Vaccine  Completed    COVID-19 Vaccine  Completed    AAA screen  Completed    Hepatitis C screen  Completed    Hepatitis A vaccine  Aged Out    Hib vaccine  Aged Out    Meningococcal (ACWY) vaccine  Aged Out     Recommendations for SchoolOut Due: see orders and patient instructions/AVS.  . Eating mostly healthy  Works part time for [email or web address] etc    Recommended screening schedule for the next 5-10 years is provided to the patient in written form: see Patient Instructions/AVS.    Jessica Diaz was seen today for medicare awv.     Diagnoses and all orders for this visit:    Routine general medical examination at a health care facility

## 2022-01-31 NOTE — PATIENT INSTRUCTIONS
Personalized Preventive Plan for Emi Williamson - 1/31/2022  Medicare offers a range of preventive health benefits. Some of the tests and screenings are paid in full while other may be subject to a deductible, co-insurance, and/or copay. Some of these benefits include a comprehensive review of your medical history including lifestyle, illnesses that may run in your family, and various assessments and screenings as appropriate. After reviewing your medical record and screening and assessments performed today your provider may have ordered immunizations, labs, imaging, and/or referrals for you. A list of these orders (if applicable) as well as your Preventive Care list are included within your After Visit Summary for your review. Other Preventive Recommendations:    · A preventive eye exam performed by an eye specialist is recommended every 1-2 years to screen for glaucoma; cataracts, macular degeneration, and other eye disorders. · A preventive dental visit is recommended every 6 months. · Try to get at least 150 minutes of exercise per week or 10,000 steps per day on a pedometer . · Order or download the FREE \"Exercise & Physical Activity: Your Everyday Guide\" from [employer]. Call 1-786.868.9688 or search [employer] online. · You need 5333-8130 mg of calcium and 6254-8042 IU of vitamin D per day. It is possible to meet your calcium requirement with diet alone, but a vitamin D supplement is usually necessary to meet this goal.  · When exposed to the sun, use a sunscreen that protects against both UVA and UVB radiation with an SPF of 30 or greater. Reapply every 2 to 3 hours or after sweating, drying off with a towel, or swimming. · Always wear a seat belt when traveling in a car. Always wear a helmet when riding a bicycle or motorcycle. Patient Education        Well Visit, Over 72: Care Instructions  Overview     Well visits can help you stay healthy. Your doctor has checked your overall health and may have suggested ways to take good care of yourself. Your doctor also may have recommended tests. At home, you can help prevent illness with healthy eating, regular exercise, and other steps. Follow-up care is a key part of your treatment and safety. Be sure to make and go to all appointments, and call your doctor if you are having problems. It's also a good idea to know your test results and keep a list of the medicines you take. How can you care for yourself at home? Get screening tests that you and your doctor decide on. Screening helps find diseases before any symptoms appear. Eat healthy foods. Choose fruits, vegetables, whole grains, protein, and low-fat dairy foods. Limit fat, especially saturated fat. Reduce salt in your diet. Limit alcohol. If you are a man, have no more than 2 drinks a day or 14 drinks a week. If you are a woman, have no more than 1 drink a day or 7 drinks a week. Since alcohol affects older adults differently, you may want to limit alcohol even more. Or you may not want to drink at all. Get at least 30 minutes of exercise on most days of the week. Walking is a good choice. You also may want to do other activities, such as running, swimming, cycling, or playing tennis or team sports. Reach and stay at a healthy weight. This will lower your risk for many problems, such as obesity, diabetes, heart disease, and high blood pressure. Do not smoke. Smoking can make health problems worse. If you need help quitting, talk to your doctor about stop-smoking programs and medicines. These can increase your chances of quitting for good. Care for your mental health. It is easy to get weighed down by worry and stress. Learn strategies to manage stress, like deep breathing and mindfulness, and stay connected with your family and community. If you find you often feel sad or hopeless, talk with your doctor. Treatment can help.   Talk to your doctor about whether you have any risk factors for sexually transmitted infections (STIs). You can help prevent STIs if you wait to have sex with a new partner (or partners) until you've each been tested for STIs. It also helps if you use condoms (male or female condoms) and if you limit your sex partners to one person who only has sex with you. Vaccines are available for some STIs. If you think you may have a problem with alcohol or drug use, talk to your doctor. This includes prescription medicines (such as amphetamines and opioids) and illegal drugs (such as cocaine and methamphetamine). Your doctor can help you figure out what type of treatment is best for you. Protect your skin from too much sun. When you're outdoors from 10 a.m. to 4 p.m., stay in the shade or cover up with clothing and a hat with a wide brim. Wear sunglasses that block UV rays. Even when it's cloudy, put broad-spectrum sunscreen (SPF 30 or higher) on any exposed skin. See a dentist one or two times a year for checkups and to have your teeth cleaned. Wear a seat belt in the car. When should you call for help? Watch closely for changes in your health, and be sure to contact your doctor if you have any problems or symptoms that concern you. Where can you learn more? Go to https://EnterpriseDBdanny.healthReachDynamicspartners. org and sign in to your [employer] account. Enter T568 in the [facility] box to learn more about \"Well Visit, Over 65: Care Instructions. \"     If you do not have an account, please click on the \"Sign Up Now\" link. Current as of: October 6, 2021               Content Version: 13.1  © 5487-6172 Healthwise, Incorporated. Care instructions adapted under license by [facility] ([facility]). If you have questions about a medical condition or this instruction, always ask your healthcare professional. [place] [address] any warranty or liability for your use of this information.          Patient Education        Learning About Ottoniel Kelly  What is a living will? A living will, also called a declaration, is a legal form. It tells your family and your doctor your wishes when you can't speak for yourself. It's used by the health professionals who will treat you as you near the end of your life or if you get seriously hurt or ill. If you put your wishes in writing, your loved ones and others will know what kind of care you want. They won't need to guess. This can ease your mind and be helpful to others. And you can change or cancel your living will at any time. A living will is not the same as an estate or property will. An estate will explains what you want to happen with your money and property after you die. How do you use it? A living will is used to describe the kinds of treatment or life support you want as you near the end of your life or if you get seriously hurt or ill. Keep these facts in mind about living vargas. Your living will is used only if you can't speak or make decisions for yourself. Most often, one or more doctors must certify that you can't speak or decide for yourself before your living will takes effect. If you get better and can speak for yourself again, you can accept or refuse any treatment. It doesn't matter what you said in your living will. Some states may limit your right to refuse treatment in certain cases. For example, you may need to clearly state in your living will that you don't want artificial hydration and nutrition, such as being fed through a tube. Is a living will a legal document? A living will is a legal document. Each state has its own laws about living vargas. And a living will may be called something else in your state. Here are some things to know about living vargas. You don't need an  to complete a living will. But legal advice can be helpful if your state's laws are unclear.  It can also help if your health history is complicated or your family can't agree on what should be in your living will. You can change your living will at any time. Some people find that their wishes about end-of-life care change as their health changes. If you make big changes to your living will, complete a new form. If you move to another state, make sure that your living will is legal in the state where you now live. In most cases, doctors will respect your wishes even if you have a form from a different state. You might use a universal form that has been approved by many states. This kind of form can sometimes be filled out and stored online. Your digital copy will then be available wherever you have a connection to the internet. The doctors and nurses who need to treat you can find it right away. Your state may offer an online registry. This is another place where you can store your living will online. It's a good idea to get your living will notarized. This means using a person called a Compario to watch two people sign, or witness, your living will. What should you know when you create a living will? Here are some questions to ask yourself as you make your living will:  Do you know enough about life support methods that might be used? If not, talk to your doctor so you know what might be done if you can't breathe on your own, your heart stops, or you can't swallow. What things would you still want to be able to do after you receive life-support methods? Would you want to be able to walk? To speak? To eat on your own? To live without the help of machines? Do you want certain Mu-ism practices performed if you become very ill? If you have a choice, where do you want to be cared for? In your home? At a hospital or nursing home? If you have a choice at the end of your life, where would you prefer to die? At home? In a hospital or nursing home? Somewhere else? Would you prefer to be buried or cremated? Do you want your organs to be donated after you die?   What should you do with your living will? Make sure that your family members and your health care agent have copies of your living will (also called a declaration). Give your doctor a copy of your living will. Ask him or her to keep it as part of your medical record. If you have more than one doctor, make sure that each one has a copy. Put a copy of your living will where it can be easily found. For example, some people may put a copy on their refrigerator door. If you are using a digital copy, be sure your doctor, family members, and health care agent know how to find and access it. Where can you learn more? Go to https://Provenderpepiceweb.[employer]. org and sign in to your [employer] account. Enter G013 in the [employer] box to learn more about \"Learning About Living Con Reeve. \"     If you do not have an account, please click on the \"Sign Up Now\" link. Current as of: March 17, 2021               Content Version: 13.1  © 2006-2021 Healthwise, Incorporated. Care instructions adapted under license by [facility] ([facility]). If you have questions about a medical condition or this instruction, always ask your healthcare professional. [place] [address] any warranty or liability for your use of this information.

## 2022-02-16 DIAGNOSIS — Z12.5 PROSTATE CANCER SCREENING: Primary | ICD-10-CM

## 2022-03-07 ENCOUNTER — OFFICE VISIT (OUTPATIENT)
Dept: UROLOGY | Age: 72
End: 2022-03-07
Payer: COMMERCIAL

## 2022-03-07 VITALS
HEART RATE: 89 BPM | RESPIRATION RATE: 16 BRPM | WEIGHT: 182 LBS | SYSTOLIC BLOOD PRESSURE: 131 MMHG | TEMPERATURE: 97.7 F | BODY MASS INDEX: 30.32 KG/M2 | HEIGHT: 65 IN | DIASTOLIC BLOOD PRESSURE: 68 MMHG

## 2022-03-07 DIAGNOSIS — R35.0 FREQUENCY OF URINATION: ICD-10-CM

## 2022-03-07 DIAGNOSIS — N40.1 HYPERTROPHY OF PROSTATE WITH URINARY OBSTRUCTION: ICD-10-CM

## 2022-03-07 DIAGNOSIS — R35.1 NOCTURIA: ICD-10-CM

## 2022-03-07 DIAGNOSIS — R39.12 WEAK URINARY STREAM: ICD-10-CM

## 2022-03-07 DIAGNOSIS — Z12.5 PROSTATE CANCER SCREENING: Primary | ICD-10-CM

## 2022-03-07 DIAGNOSIS — N13.8 HYPERTROPHY OF PROSTATE WITH URINARY OBSTRUCTION: ICD-10-CM

## 2022-03-07 PROCEDURE — 99214 OFFICE O/P EST MOD 30 MIN: CPT | Performed by: UROLOGY

## 2022-03-07 RX ORDER — TAMSULOSIN HYDROCHLORIDE 0.4 MG/1
0.4 CAPSULE ORAL DAILY
Qty: 90 CAPSULE | Refills: 3 | Status: SHIPPED | OUTPATIENT
Start: 2022-03-07

## 2022-03-07 ASSESSMENT — ENCOUNTER SYMPTOMS
EYE PAIN: 0
CONSTIPATION: 0
DIARRHEA: 0
NAUSEA: 0
VOMITING: 0
WHEEZING: 0
BACK PAIN: 0
ABDOMINAL PAIN: 0
SHORTNESS OF BREATH: 0
COUGH: 0

## 2022-03-07 NOTE — PROGRESS NOTES
[address] [facility] [address]  Dept: [address] [facility] Urology Office Note - Established    Patient:  Brandi Laureano  YOB: 1950  Date: 3/7/2022    The patient is a 71 y.o. male who presents todayfor evaluation of the following problems:   Chief Complaint   Patient presents with    Follow-up     1 yr f/u w/ psa - care everywhere       HPI  This is a very pleasant 71-year-old gentleman who has a history of BPH. He continues to take Flomax and does reasonably well with this. He does have some frequency and wakes up 2-3 times per night. The symptoms are tolerable. His recent PSA was 0.33. Summary of old records: N/A    Additional History: N/A    Procedures Today: N/A    Urinalysis today:  No results found for this visit on 03/07/22. Last several PSA's:  No results found for: PSA  Last total testosterone:  No results found for: TESTOSTERONE    AUA Symptom Score (3/7/2022):   INCOMPLETE EMPTYING: How often have you had the sensation of not emptying your bladder?: Not at all  FREQUENCY: How often do you have to urinate less than every two hours?: Not at all  INTERMITTENCY: How often have you found you stopped and started again several times when you urinated?: Not at all  URGENCY: How often have you found it difficult to postpone urination?: Not at all  WEAK STREAM: How often have you had a weak urinary stream?: Not at all  STRAINING: How often have you had to strain to start  urination?: Not at all  NOCTURIA: How many times did you typically get up at night to uriniate?: NONE  TOTAL I-PSS SCORE[de-identified] 0       Last BUN and creatinine:  No results found for: BUN  No results found for: CREATININE    Additional Lab/Culture results: none    Imaging Reviewed during this Office Visit: none  (results were independently reviewed by physician and radiology report verified)    PAST MEDICAL, FAMILY AND SOCIAL HISTORY UPDATE:  Past Medical History:   Diagnosis Date    H/O hepatitis      Past Surgical History:   Procedure Laterality Date    EYE SURGERY Bilateral 2011    cataract    TONSILLECTOMY       Family History   Problem Relation Age of Onset    Diabetes Mother     Other Mother         renal disease    Diabetes Father     Diabetes Sister     Diabetes Maternal Aunt     Kidney stones Brother      Outpatient Medications Marked as Taking for the 3/7/22 encounter (Office Visit) with Kamille Mishra MD   Medication Sig Dispense Refill    tamsulosin (FLOMAX) 0.4 MG capsule Take 1 capsule by mouth daily 90 capsule 3    simvastatin (ZOCOR) 40 MG tablet Take 1 tablet by mouth nightly 90 tablet 1    metFORMIN (GLUCOPHAGE) 500 MG tablet Take 1 tablet by mouth once daily with breakfast 90 tablet 1    amLODIPine-benazepril (LOTREL) 10-40 MG per capsule Take 1 capsule by mouth daily 90 capsule 2    blood glucose monitor strips Test one times a day & as needed for symptoms of irregular blood glucose.  Type 2 DM not on insulin 50 strip 3    Lancets MISC 1 each by Does not apply route daily 100 each 5    naproxen (NAPROSYN) 500 MG tablet Take 1 tablet by mouth 2 times daily as needed for Pain 180 tablet 2    blood glucose test strips (TRUE METRIX BLOOD GLUCOSE TEST) strip USE AS DIRECTED ONCE DAILY 50 each 3    TRUEplus Lancets 30G MISC USE AS DIRECTED ONCE DAILY 100 each 3    Multiple Vitamin (MULTI VITAMIN DAILY) TABS Take 1 tablet by mouth daily         Cialis [tadalafil] and Ranitidine  Social History     Tobacco Use   Smoking Status Former Smoker    Packs/day: 0.10    Years: 1.00    Pack years: 0.10    Quit date: 1981    Years since quittin.2   Smokeless Tobacco Never Used     (Ifpatient a smoker, smoking cessation counseling offered)    Social History     Substance and Sexual Activity   Alcohol Use No    Alcohol/week: 0.0 standard drinks       REVIEW OF SYSTEMS:  Review of Systems    Physical Exam:      Vitals:    03/07/22 1015   BP: 131/68   Pulse: 89   Resp: 16   Temp: 97.7 °F (36.5 °C)     Body mass index is 30.29 kg/m². Patient is a 71 y.o. male in no acute distress and alert and oriented to person, place and time. Physical Exam  Constitutional: Patient in no acute distress. Neuro: Alert and oriented to person, place and time. Psych: Mood normal, affect normal  Skin: No rash noted      Assessment and Plan      1. Prostate cancer screening    2. Hypertrophy of prostate with urinary obstruction    3. Weak urinary stream    4. Frequency of urination    5. Nocturia           Plan: cont flomax  F/u one year psa      Return in about 1 year (around 3/7/2023) for psa. Prescriptions Ordered:  Orders Placed This Encounter   Medications    tamsulosin (FLOMAX) 0.4 MG capsule     Sig: Take 1 capsule by mouth daily     Dispense:  90 capsule     Refill:  3     Orders Placed:  No orders of the defined types were placed in this encounter. Kenyatta De Leon MD    Agree with the ROS entered by the MA.

## 2022-05-06 DIAGNOSIS — E11.9 TYPE 2 DIABETES MELLITUS WITHOUT COMPLICATION, UNSPECIFIED WHETHER LONG TERM INSULIN USE (HCC): ICD-10-CM

## 2022-06-15 DIAGNOSIS — M17.10 ARTHRITIS OF KNEE: ICD-10-CM

## 2022-06-15 RX ORDER — NAPROXEN 500 MG/1
TABLET ORAL
Qty: 180 TABLET | Refills: 0 | Status: SHIPPED | OUTPATIENT
Start: 2022-06-15

## 2022-06-22 RX ORDER — AMLODIPINE BESYLATE AND BENAZEPRIL HYDROCHLORIDE 10; 40 MG/1; MG/1
CAPSULE ORAL
Qty: 90 CAPSULE | Refills: 0 | Status: SHIPPED | OUTPATIENT
Start: 2022-06-22 | End: 2022-08-01 | Stop reason: DRUGHIGH

## 2022-07-05 DIAGNOSIS — E11.9 TYPE 2 DIABETES MELLITUS WITHOUT COMPLICATION (HCC): ICD-10-CM

## 2022-07-05 RX ORDER — CALCIUM CITRATE/VITAMIN D3 200MG-6.25
TABLET ORAL
Qty: 50 EACH | Refills: 5 | Status: SHIPPED | OUTPATIENT
Start: 2022-07-05 | End: 2022-07-06 | Stop reason: SDUPTHER

## 2022-07-06 DIAGNOSIS — E11.9 TYPE 2 DIABETES MELLITUS WITHOUT COMPLICATION (HCC): ICD-10-CM

## 2022-07-06 RX ORDER — CALCIUM CITRATE/VITAMIN D3 200MG-6.25
1 TABLET ORAL DAILY
Qty: 50 EACH | Refills: 5 | Status: SHIPPED | OUTPATIENT
Start: 2022-07-06 | End: 2022-07-11 | Stop reason: SDUPTHER

## 2022-07-11 ENCOUNTER — TELEPHONE (OUTPATIENT)
Dept: PRIMARY CARE CLINIC | Age: 72
End: 2022-07-11

## 2022-07-11 DIAGNOSIS — E11.9 TYPE 2 DIABETES MELLITUS WITHOUT COMPLICATION (HCC): ICD-10-CM

## 2022-07-11 RX ORDER — CALCIUM CITRATE/VITAMIN D3 200MG-6.25
1 TABLET ORAL DAILY
Qty: 50 EACH | Refills: 5 | Status: SHIPPED | OUTPATIENT
Start: 2022-07-11

## 2022-07-11 NOTE — TELEPHONE ENCOUNTER
Per Pharmacy. Pt's test strips needs re sent. Can't have as needed on it. Needs to be that pt test once daily and dx needs to be on rx.     [facility] [address] listed

## 2022-07-19 ENCOUNTER — OFFICE VISIT (OUTPATIENT)
Dept: PRIMARY CARE CLINIC | Age: 72
End: 2022-07-19
Payer: COMMERCIAL

## 2022-07-19 VITALS
SYSTOLIC BLOOD PRESSURE: 136 MMHG | BODY MASS INDEX: 29.16 KG/M2 | HEIGHT: 65 IN | HEART RATE: 96 BPM | WEIGHT: 175 LBS | DIASTOLIC BLOOD PRESSURE: 76 MMHG | OXYGEN SATURATION: 96 %

## 2022-07-19 DIAGNOSIS — G44.1 OTHER VASCULAR HEADACHE: Primary | ICD-10-CM

## 2022-07-19 DIAGNOSIS — R01.1 HEART MURMUR: ICD-10-CM

## 2022-07-19 DIAGNOSIS — R09.89 BILATERAL CAROTID BRUITS: ICD-10-CM

## 2022-07-19 DIAGNOSIS — E78.49 OTHER HYPERLIPIDEMIA: ICD-10-CM

## 2022-07-19 DIAGNOSIS — I10 ESSENTIAL HYPERTENSION: ICD-10-CM

## 2022-07-19 DIAGNOSIS — R53.83 FATIGUE, UNSPECIFIED TYPE: ICD-10-CM

## 2022-07-19 DIAGNOSIS — R51.9 NEW ONSET HEADACHE: ICD-10-CM

## 2022-07-19 DIAGNOSIS — R42 LIGHTHEADED: ICD-10-CM

## 2022-07-19 DIAGNOSIS — E11.9 TYPE 2 DIABETES MELLITUS WITHOUT COMPLICATION, WITHOUT LONG-TERM CURRENT USE OF INSULIN (HCC): ICD-10-CM

## 2022-07-19 PROCEDURE — 1123F ACP DISCUSS/DSCN MKR DOCD: CPT | Performed by: PHYSICIAN ASSISTANT

## 2022-07-19 PROCEDURE — 99214 OFFICE O/P EST MOD 30 MIN: CPT | Performed by: PHYSICIAN ASSISTANT

## 2022-07-19 ASSESSMENT — ENCOUNTER SYMPTOMS: SHORTNESS OF BREATH: 0

## 2022-07-19 NOTE — PROGRESS NOTES
[facility]  [address]  Phone: [phone]  Fax: [phone]    Emi Dorantes is a 71 y.o. male who presents today for his medical conditions/complaintsas noted below. Chief Complaint   Patient presents with    Dizziness     Dizziness and lightheaded. Patient feels dizzy when standing, comes and goes. Headache     Headache in the back of his head, on and off. HA started last week when he was cutting grass. Patient said he went home after cutting grass and went to sleep due to having no energy. HPI:     Dizziness  Associated symptoms include fatigue and headaches. Pertinent negatives include no chest pain. Headache   Associated symptoms include dizziness (no vertigo). HA feels like a pulsation in back of head and neck  Feels body pulsing --chest feels \"funny\", no a pain or pressure. Heart will race when exertiing himself. NO SOB or CP or nausea. No arm or jaw pain. Feels lightheaded  This HA is not like any other HA he has had. Located in back of head  Also much more fatigued.      Current Outpatient Medications   Medication Sig Dispense Refill    blood glucose test strips (TRUE METRIX BLOOD GLUCOSE TEST) strip 1 each by Other route daily Type 2 DM not on insulin 50 each 5    amLODIPine-benazepril (LOTREL) 10-40 MG per capsule Take 1 capsule by mouth once daily 90 capsule 0    naproxen (NAPROSYN) 500 MG tablet Take 1 tablet by mouth twice daily as needed for pain 180 tablet 0    metFORMIN (GLUCOPHAGE) 500 MG tablet Take 1 tablet by mouth daily (with breakfast) 90 tablet 1    tamsulosin (FLOMAX) 0.4 MG capsule Take 1 capsule by mouth daily 90 capsule 3    simvastatin (ZOCOR) 40 MG tablet Take 1 tablet by mouth nightly 90 tablet 1    Lancets MISC 1 each by Does not apply route daily 100 each 5    TRUEplus Lancets 30G MISC USE AS DIRECTED ONCE DAILY 100 each 3    Multiple Vitamin (MULTI VITAMIN DAILY) TABS Take 1 tablet by mouth daily      blood glucose monitor strips Test one times a day & as needed for symptoms of irregular blood glucose. Type 2 DM not on insulin 50 strip 3     No current facility-administered medications for this visit. Allergies   Allergen Reactions    Cialis [Tadalafil]      Leg pain/cramps    Ranitidine      Leg spasms       Subjective:      Review of Systems   Constitutional:  Positive for fatigue. Respiratory:  Negative for shortness of breath. Cardiovascular:  Negative for chest pain. Genitourinary:  Positive for flank pain (left). Negative for hematuria. Nocturia   Neurological:  Positive for dizziness (no vertigo), light-headedness and headaches. Negative for syncope. Objective:     /76   Pulse 96   Ht 5' 5\" (1.651 m)   Wt 175 lb (79.4 kg)   SpO2 96%   BMI 29.12 kg/m²   Physical Exam  Vitals and nursing note reviewed. Constitutional:       Appearance: Normal appearance. He is not ill-appearing. Eyes:      Extraocular Movements: Extraocular movements intact. Pupils: Pupils are equal, round, and reactive to light. Neck:      Vascular: Carotid bruit present. Cardiovascular:      Rate and Rhythm: Normal rate and regular rhythm. Heart sounds: Murmur heard. No friction rub. No gallop. Pulmonary:      Effort: Pulmonary effort is normal.      Breath sounds: Normal breath sounds. No wheezing, rhonchi or rales. Musculoskeletal:      Cervical back: Normal range of motion. No tenderness. Right lower leg: No edema. Left lower leg: No edema. Neurological:      General: No focal deficit present. Mental Status: He is alert and oriented to person, place, and time. Motor: No weakness. Coordination: Coordination normal.      Gait: Gait normal.      Deep Tendon Reflexes: Reflexes normal.       Assessment:       Diagnosis Orders   1. Other vascular headache  CTA HEAD W CONTRAST      2. New onset headache  CTA HEAD W CONTRAST      3.  Heart murmur ECHO Complete 2D W Doppler W Color      4. Type 2 diabetes mellitus without complication, without long-term current use of insulin (HCC)  ECHO Complete 2D W Doppler W Color      5. Essential hypertension  ECHO Complete 2D W Doppler W Color    VL DUP CAROTID BILATERAL      6. Other hyperlipidemia        7. Bilateral carotid bruits  VL DUP CAROTID BILATERAL      8. Fatigue, unspecified type  ECHO Complete 2D W Doppler W Color      9. Lightheaded             Plan:    CTA  Carotid dopplers  Echocardiogram  BW ordered  Hydrate well  Use Tylenol or Exedrine for the HA      Return in about 3 weeks (around 8/9/2022) for Imaging results. Orders Placed This Encounter   Procedures    VL DUP CAROTID BILATERAL     Standing Status:   Future     Standing Expiration Date:   7/19/2023    CTA HEAD W CONTRAST     Standing Status:   Future     Standing Expiration Date:   7/19/2023     Order Specific Question:   STAT Creatinine as needed:     Answer:   Yes     Order Specific Question:   Reason for exam:     Answer:   r/o aneurysm    ECHO Complete 2D W Doppler W Color     Standing Status:   Future     Standing Expiration Date:   7/19/2023     Order Specific Question:   Reason for exam:     Answer:   r/o aortic stenosis       No orders of the defined types were placed in this encounter.           Electronically signed by Christine Cuba 7/19/2022 at 11:36 AM

## 2022-07-20 ENCOUNTER — TELEPHONE (OUTPATIENT)
Dept: PRIMARY CARE CLINIC | Age: 72
End: 2022-07-20

## 2022-07-20 NOTE — TELEPHONE ENCOUNTER
JULISA  Called and notified patient lab work was ordered at [facility] yesterday and he should have it done the the day before or the day of CTA.  Faxing BW to [facility] per patinets request.

## 2022-08-01 ENCOUNTER — OFFICE VISIT (OUTPATIENT)
Dept: PRIMARY CARE CLINIC | Age: 72
End: 2022-08-01
Payer: COMMERCIAL

## 2022-08-01 VITALS
SYSTOLIC BLOOD PRESSURE: 128 MMHG | WEIGHT: 176.8 LBS | BODY MASS INDEX: 29.46 KG/M2 | HEIGHT: 65 IN | DIASTOLIC BLOOD PRESSURE: 66 MMHG | HEART RATE: 85 BPM | OXYGEN SATURATION: 97 %

## 2022-08-01 DIAGNOSIS — I10 ESSENTIAL HYPERTENSION: Primary | ICD-10-CM

## 2022-08-01 DIAGNOSIS — R05.3 CHRONIC COUGH: ICD-10-CM

## 2022-08-01 DIAGNOSIS — R12 HEARTBURN: ICD-10-CM

## 2022-08-01 DIAGNOSIS — E11.9 TYPE 2 DIABETES MELLITUS WITHOUT COMPLICATION, WITHOUT LONG-TERM CURRENT USE OF INSULIN (HCC): ICD-10-CM

## 2022-08-01 LAB — HBA1C MFR BLD: 6.7 %

## 2022-08-01 PROCEDURE — 99214 OFFICE O/P EST MOD 30 MIN: CPT | Performed by: FAMILY MEDICINE

## 2022-08-01 PROCEDURE — 83036 HEMOGLOBIN GLYCOSYLATED A1C: CPT | Performed by: FAMILY MEDICINE

## 2022-08-01 PROCEDURE — 1123F ACP DISCUSS/DSCN MKR DOCD: CPT | Performed by: FAMILY MEDICINE

## 2022-08-01 RX ORDER — AMLODIPINE BESYLATE AND BENAZEPRIL HYDROCHLORIDE 5; 20 MG/1; MG/1
1 CAPSULE ORAL DAILY
Qty: 30 CAPSULE | Refills: 1 | Status: SHIPPED | OUTPATIENT
Start: 2022-08-01 | End: 2022-09-28

## 2022-08-01 RX ORDER — OMEPRAZOLE 40 MG/1
40 CAPSULE, DELAYED RELEASE ORAL
Qty: 30 CAPSULE | Refills: 5 | Status: SHIPPED | OUTPATIENT
Start: 2022-08-01

## 2022-08-01 ASSESSMENT — PATIENT HEALTH QUESTIONNAIRE - PHQ9
SUM OF ALL RESPONSES TO PHQ QUESTIONS 1-9: 0
SUM OF ALL RESPONSES TO PHQ QUESTIONS 1-9: 0
SUM OF ALL RESPONSES TO PHQ9 QUESTIONS 1 & 2: 0
SUM OF ALL RESPONSES TO PHQ QUESTIONS 1-9: 0
SUM OF ALL RESPONSES TO PHQ QUESTIONS 1-9: 0
2. FEELING DOWN, DEPRESSED OR HOPELESS: 0
1. LITTLE INTEREST OR PLEASURE IN DOING THINGS: 0

## 2022-08-01 ASSESSMENT — ENCOUNTER SYMPTOMS: COUGH: 1

## 2022-08-01 NOTE — PROGRESS NOTES
[facility] PRIMARY CARE  [address] [facility] [address]  Dept: [phone]    Pranay Perera is a 71 y.o. male Established patient, who presents today for his medical conditions/complaintsas noted below. Chief Complaint   Patient presents with    Diabetes     Pt here today for diabetic f/u, last a1c 6.6       HPI:     HPI  113-150  Works part time: lot of physical activity    Dizzy if he turns to fast or gets up to fast, it is NOT off balance but is more lightheaded feeling. Tired a lot. Gets tired faster than usual    Has tests scheduled at [facility]. Reviewed prior notes None  Reviewed previous Labs    LDL Calculated (mg/dL)   Date Value   2021 80   2020 61   2019 83       (goal LDL is <100)   Hemoglobin A1C (%)   Date Value   2021 6.6     BP Readings from Last 3 Encounters:   22 128/66   22 136/76   22 131/68          (goal 120/80)    Past Medical History:   Diagnosis Date    H/O hepatitis       Past Surgical History:   Procedure Laterality Date    EYE SURGERY Bilateral     cataract    TONSILLECTOMY         Family History   Problem Relation Age of Onset    Cancer Mother         breast cancer    Diabetes Mother     Other Mother         renal disease    Diabetes Father     Diabetes Sister     Diabetes Brother     Kidney stones Brother     Diabetes Maternal Aunt        Social History     Tobacco Use    Smoking status: Former     Packs/day: 0.10     Years: 1.00     Pack years: 0.10     Types: Cigarettes     Quit date: 1981     Years since quittin.6    Smokeless tobacco: Never   Substance Use Topics    Alcohol use: No     Alcohol/week: 0.0 standard drinks      Current Outpatient Medications   Medication Sig Dispense Refill    amLODIPine-benazepril (LOTREL) 5-20 MG per capsule Take 1 capsule by mouth in the morning.  30 capsule 1    omeprazole (PRILOSEC) 40 MG delayed release capsule Take 1 capsule by mouth every morning (before breakfast) 30 capsule 5    blood glucose test strips (TRUE METRIX BLOOD GLUCOSE TEST) strip 1 each by Other route daily Type 2 DM not on insulin 50 each 5    naproxen (NAPROSYN) 500 MG tablet Take 1 tablet by mouth twice daily as needed for pain 180 tablet 0    metFORMIN (GLUCOPHAGE) 500 MG tablet Take 1 tablet by mouth daily (with breakfast) 90 tablet 1    tamsulosin (FLOMAX) 0.4 MG capsule Take 1 capsule by mouth daily 90 capsule 3    simvastatin (ZOCOR) 40 MG tablet Take 1 tablet by mouth nightly 90 tablet 1    blood glucose monitor strips Test one times a day & as needed for symptoms of irregular blood glucose. Type 2 DM not on insulin 50 strip 3    Lancets MISC 1 each by Does not apply route daily 100 each 5    TRUEplus Lancets 30G MISC USE AS DIRECTED ONCE DAILY 100 each 3    Multiple Vitamin (MULTI VITAMIN DAILY) TABS Take 1 tablet by mouth daily       No current facility-administered medications for this visit. Allergies   Allergen Reactions    Cialis [Tadalafil]      Leg pain/cramps    Ranitidine      Leg spasms       Health Maintenance   Topic Date Due    DTaP/Tdap/Td vaccine (1 - Tdap) Never done    Shingles vaccine (1 of 2) Never done    Lipids  07/19/2022    Flu vaccine (1) 09/01/2022    Diabetic foot exam  09/27/2022    Diabetic retinal exam  10/25/2022    A1C test (Diabetic or Prediabetic)  11/22/2022    Depression Screen  01/31/2023    Annual Wellness Visit (AWV)  02/01/2023    Colorectal Cancer Screen  02/07/2029    Pneumococcal 65+ years Vaccine  Completed    COVID-19 Vaccine  Completed    AAA screen  Completed    Hepatitis C screen  Completed    Hepatitis A vaccine  Aged Out    Hib vaccine  Aged Out    Meningococcal (ACWY) vaccine  Aged Out       Subjective:      Review of Systems   Respiratory:  Positive for cough.          Chronic cough: worse over the weekend     Objective:     /66   Pulse 85   Ht 5' 5\" (1.651 m)   Wt 176 lb 12.8 oz (80.2 kg)   SpO2 97%   BMI 29.42 kg/m²   Physical Exam  Vitals and nursing note reviewed. Constitutional:       General: He is not in acute distress. Appearance: He is well-developed. He is not ill-appearing. HENT:      Head: Normocephalic and atraumatic. Right Ear: External ear normal.      Left Ear: External ear normal.   Eyes:      General: No scleral icterus. Right eye: No discharge. Left eye: No discharge. Conjunctiva/sclera: Conjunctivae normal.   Neck:      Thyroid: No thyromegaly. Trachea: No tracheal deviation. Cardiovascular:      Rate and Rhythm: Normal rate and regular rhythm. Heart sounds: Normal heart sounds. Pulmonary:      Effort: Pulmonary effort is normal. No respiratory distress. Breath sounds: Normal breath sounds. No wheezing. Lymphadenopathy:      Cervical: No cervical adenopathy. Skin:     General: Skin is warm. Findings: No rash. Neurological:      Mental Status: He is alert and oriented to person, place, and time. Psychiatric:         Mood and Affect: Mood normal.         Behavior: Behavior normal.         Thought Content: Thought content normal.       Assessment:       Diagnosis Orders   1. Essential hypertension  Lipid Panel    Comprehensive Metabolic Panel, Fasting      2. Type 2 diabetes mellitus without complication, without long-term current use of insulin ([facility])  POCT glycosylated hemoglobin (Hb A1C)    Lipid Panel    Comprehensive Metabolic Panel, Fasting      3. Chronic cough  omeprazole (PRILOSEC) 40 MG delayed release capsule      4. Heartburn  omeprazole (PRILOSEC) 40 MG delayed release capsule             Plan:      No follow-ups on file.   Decrease the BP med due to lightheadness  Try prilosec due to chronic cough: may be from GERD or could be from [facility] med  Orders Placed This Encounter   Procedures    Lipid Panel     Standing Status:   Future     Standing Expiration Date:   8/1/2023     Order Specific Question:   Is Patient Fasting?/# of Hours Answer:   yes    Comprehensive Metabolic Panel, Fasting     Standing Status:   Future     Standing Expiration Date:   8/1/2023    POCT glycosylated hemoglobin (Hb A1C)     Orders Placed This Encounter   Medications    amLODIPine-benazepril (LOTREL) 5-20 MG per capsule     Sig: Take 1 capsule by mouth in the morning. Dispense:  30 capsule     Refill:  1    omeprazole (PRILOSEC) 40 MG delayed release capsule     Sig: Take 1 capsule by mouth every morning (before breakfast)     Dispense:  30 capsule     Refill:  5       Patient given educationalmaterials - see patient instructions. Discussed use, benefit, and side effectsof prescribed medications. All patient questions answered. Pt voiced understanding. Reviewed health maintenance. Instructed to continue current medications, diet andexercise. Patient agreed with treatment plan. Follow up as directed.      Electronicallysigned by Kelli Gracia MD on 8/1/2022 at 1:42 PM

## 2022-08-01 NOTE — PATIENT INSTRUCTIONS

## 2022-08-22 LAB
BASOPHILS ABSOLUTE: NORMAL
BASOPHILS RELATIVE PERCENT: NORMAL
BUN BLDV-MCNC: NORMAL MG/DL
CALCIUM SERPL-MCNC: NORMAL MG/DL
CHLORIDE BLD-SCNC: NORMAL MMOL/L
CO2: NORMAL
CREAT SERPL-MCNC: NORMAL MG/DL
EOSINOPHILS ABSOLUTE: NORMAL
EOSINOPHILS RELATIVE PERCENT: NORMAL
GFR CALCULATED: NORMAL
GLUCOSE BLD-MCNC: NORMAL MG/DL
HCT VFR BLD CALC: NORMAL %
HEMOGLOBIN: NORMAL
LYMPHOCYTES ABSOLUTE: NORMAL
LYMPHOCYTES RELATIVE PERCENT: NORMAL
MCH RBC QN AUTO: NORMAL PG
MCHC RBC AUTO-ENTMCNC: NORMAL G/DL
MCV RBC AUTO: NORMAL FL
MONOCYTES ABSOLUTE: NORMAL
MONOCYTES RELATIVE PERCENT: NORMAL
NEUTROPHILS ABSOLUTE: NORMAL
NEUTROPHILS RELATIVE PERCENT: NORMAL
PDW BLD-RTO: NORMAL %
PLATELET # BLD: NORMAL 10*3/UL
PMV BLD AUTO: NORMAL FL
POTASSIUM SERPL-SCNC: NORMAL MMOL/L
RBC # BLD: NORMAL 10*6/UL
SODIUM BLD-SCNC: NORMAL MMOL/L
TSH SERPL DL<=0.05 MIU/L-ACNC: NORMAL M[IU]/L
WBC # BLD: NORMAL 10*3/UL

## 2022-08-24 DIAGNOSIS — I10 ESSENTIAL HYPERTENSION: ICD-10-CM

## 2022-08-24 DIAGNOSIS — R53.83 FATIGUE, UNSPECIFIED TYPE: ICD-10-CM

## 2022-08-24 DIAGNOSIS — R42 LIGHTHEADED: ICD-10-CM

## 2022-08-31 DIAGNOSIS — R53.83 FATIGUE, UNSPECIFIED TYPE: ICD-10-CM

## 2022-08-31 DIAGNOSIS — E11.9 TYPE 2 DIABETES MELLITUS WITHOUT COMPLICATION, WITHOUT LONG-TERM CURRENT USE OF INSULIN (HCC): ICD-10-CM

## 2022-08-31 DIAGNOSIS — I10 ESSENTIAL HYPERTENSION: ICD-10-CM

## 2022-08-31 DIAGNOSIS — R01.1 HEART MURMUR: ICD-10-CM

## 2022-09-02 DIAGNOSIS — R51.9 NEW ONSET HEADACHE: ICD-10-CM

## 2022-09-02 DIAGNOSIS — G44.1 OTHER VASCULAR HEADACHE: ICD-10-CM

## 2022-09-06 ENCOUNTER — OFFICE VISIT (OUTPATIENT)
Dept: PRIMARY CARE CLINIC | Age: 72
End: 2022-09-06
Payer: COMMERCIAL

## 2022-09-06 VITALS
HEART RATE: 78 BPM | HEIGHT: 65 IN | BODY MASS INDEX: 29.32 KG/M2 | WEIGHT: 176 LBS | SYSTOLIC BLOOD PRESSURE: 134 MMHG | DIASTOLIC BLOOD PRESSURE: 78 MMHG | OXYGEN SATURATION: 98 %

## 2022-09-06 DIAGNOSIS — I65.22 ASYMPTOMATIC STENOSIS OF LEFT CAROTID ARTERY: ICD-10-CM

## 2022-09-06 DIAGNOSIS — G44.1 OTHER VASCULAR HEADACHE: ICD-10-CM

## 2022-09-06 DIAGNOSIS — I67.1 BRAIN ANEURYSM: Primary | ICD-10-CM

## 2022-09-06 DIAGNOSIS — I10 ESSENTIAL HYPERTENSION: ICD-10-CM

## 2022-09-06 DIAGNOSIS — R09.89 BILATERAL CAROTID BRUITS: ICD-10-CM

## 2022-09-06 PROCEDURE — 99213 OFFICE O/P EST LOW 20 MIN: CPT | Performed by: PHYSICIAN ASSISTANT

## 2022-09-06 PROCEDURE — 1123F ACP DISCUSS/DSCN MKR DOCD: CPT | Performed by: PHYSICIAN ASSISTANT

## 2022-09-06 NOTE — PROGRESS NOTES
[address] [place] [address] PRIMARY CARE  [address] [address]  [facility] [address]  Dept: [phone]    Ramana Kendrick is a 71 y.o. male who presents today for his medical conditions/complaints as noted below. Chief Complaint   Patient presents with    Discuss Labs     Discuss CT.       HPI:     HPI  Echo is normal. Carotid has some carotid stenosis worse on left. CTA is showing a 3mm aneursym. Having occ dizziness and rare HA now when he overexerts.   NO visual distrubance  LDL Calculated (mg/dL)   Date Value   2021 80   2020 61   2019 83       (goal LDL is <100)   No results found for: AST, ALT, BUN, CR  BP Readings from Last 3 Encounters:   22 134/78   22 128/66   22 136/76          (goal 120/80)    Past Medical History:   Diagnosis Date    H/O hepatitis       Past Surgical History:   Procedure Laterality Date    EYE SURGERY Bilateral     cataract    TONSILLECTOMY         Family History   Problem Relation Age of Onset    Cancer Mother         breast cancer    Diabetes Mother     Other Mother         renal disease    Diabetes Father     Diabetes Sister     Diabetes Brother     Kidney stones Brother     Diabetes Maternal Aunt        Social History     Tobacco Use    Smoking status: Former     Packs/day: 0.10     Years: 1.00     Pack years: 0.10     Types: Cigarettes     Quit date: 1981     Years since quittin.7    Smokeless tobacco: Never   Substance Use Topics    Alcohol use: No     Alcohol/week: 0.0 standard drinks      Current Outpatient Medications   Medication Sig Dispense Refill    omeprazole (PRILOSEC) 40 MG delayed release capsule Take 1 capsule by mouth every morning (before breakfast) 30 capsule 5    blood glucose test strips (TRUE METRIX BLOOD GLUCOSE TEST) strip 1 each by Other route daily Type 2 DM not on insulin 50 each 5    naproxen (NAPROSYN) 500 MG tablet Take 1 tablet by mouth twice daily as needed for pain 180 tablet 0    metFORMIN (GLUCOPHAGE) 500 MG tablet Take 1 tablet by mouth daily (with breakfast) 90 tablet 1    tamsulosin (FLOMAX) 0.4 MG capsule Take 1 capsule by mouth daily 90 capsule 3    simvastatin (ZOCOR) 40 MG tablet Take 1 tablet by mouth nightly 90 tablet 1    blood glucose monitor strips Test one times a day & as needed for symptoms of irregular blood glucose. Type 2 DM not on insulin 50 strip 3    Lancets MISC 1 each by Does not apply route daily 100 each 5    TRUEplus Lancets 30G MISC USE AS DIRECTED ONCE DAILY 100 each 3    Multiple Vitamin (MULTI VITAMIN DAILY) TABS Take 1 tablet by mouth daily      amLODIPine-benazepril (LOTREL) 5-20 MG per capsule Take 1 capsule by mouth in the morning. 30 capsule 1     No current facility-administered medications for this visit. Allergies   Allergen Reactions    Cialis [Tadalafil]      Leg pain/cramps    Ranitidine      Leg spasms       Health Maintenance   Topic Date Due    DTaP/Tdap/Td vaccine (1 - Tdap) Never done    Shingles vaccine (1 of 2) Never done    Diabetic microalbuminuria test  07/12/2022    Lipids  07/19/2022    Flu vaccine (1) 09/01/2022    Diabetic foot exam  09/27/2022    Diabetic retinal exam  10/25/2022    Annual Wellness Visit (AWV)  02/01/2023    A1C test (Diabetic or Prediabetic)  08/01/2023    Depression Screen  08/01/2023    Colorectal Cancer Screen  02/07/2029    Pneumococcal 65+ years Vaccine  Completed    COVID-19 Vaccine  Completed    AAA screen  Completed    Hepatitis C screen  Completed    Hepatitis A vaccine  Aged Out    Hib vaccine  Aged Out    Meningococcal (ACWY) vaccine  Aged Out       Subjective:      Review of Systems   Eyes:  Negative for visual disturbance. Neurological:  Positive for headaches (if overdoing it). Negative for dizziness and light-headedness. Objective:     /78   Pulse 78   Ht 5' 5\" (1.651 m)   Wt 176 lb (79.8 kg)   SpO2 98%   BMI 29.29 kg/m²   Physical Exam  Vitals and nursing note reviewed. Constitutional:       Appearance: Normal appearance. Neck:      Vascular: No carotid bruit. Cardiovascular:      Rate and Rhythm: Normal rate and regular rhythm. Heart sounds: Normal heart sounds. Pulmonary:      Effort: Pulmonary effort is normal.      Breath sounds: Normal breath sounds. Abdominal:      General: Abdomen is flat. Bowel sounds are normal. There is no distension. Neurological:      Mental Status: He is alert and oriented to person, place, and time. Psychiatric:         Mood and Affect: Mood normal.       Assessment:       Diagnosis Orders   1. Brain aneurysm  External Referral To Vascular Surgery      2. Other vascular headache  External Referral To Vascular Surgery      3. Asymptomatic stenosis of left carotid artery             Plan:    Referral to [address] Neurovascular specialist.   BP controlled  Avoid heavy exetions  REviewed tests with him. D/W Dr. Gracy Cook  Return if symptoms worsen or fail to improve. Orders Placed This Encounter   Procedures    External Referral To Vascular Surgery     Referral Priority:   Routine     Referral Type:   Eval and Treat     Referral Reason:   Specialty Services Required     Referred to Provider:   Jarrett Driver MD     Requested Specialty:   Vascular Surgery     Number of Visits Requested:   1       No orders of the defined types were placed in this encounter. Patient given educational materials - see patient instructions. Discussed use, benefit, and side effects of prescribed medications. All patient questions answered. Pt voiced understanding. Reviewed health maintenance. Instructed to continue current medications, diet and exercise. Patient agreed with treatment plan. Follow up as directed.      Electronically signed by Lorena Goyal PA-C on 9/6/2022 at 1:57 PM

## 2022-09-08 ENCOUNTER — OFFICE VISIT (OUTPATIENT)
Dept: NEUROLOGY | Age: 72
End: 2022-09-08
Payer: COMMERCIAL

## 2022-09-08 VITALS
SYSTOLIC BLOOD PRESSURE: 135 MMHG | HEIGHT: 65 IN | TEMPERATURE: 97.7 F | HEART RATE: 97 BPM | DIASTOLIC BLOOD PRESSURE: 66 MMHG | OXYGEN SATURATION: 95 % | WEIGHT: 175 LBS | BODY MASS INDEX: 29.16 KG/M2

## 2022-09-08 DIAGNOSIS — I67.1 RIGHT INTERNAL CAROTID ARTERY ANEURYSM: Primary | ICD-10-CM

## 2022-09-08 DIAGNOSIS — R42 LIGHTHEADEDNESS: ICD-10-CM

## 2022-09-08 PROCEDURE — 99204 OFFICE O/P NEW MOD 45 MIN: CPT | Performed by: PSYCHIATRY & NEUROLOGY

## 2022-09-08 PROCEDURE — 1123F ACP DISCUSS/DSCN MKR DOCD: CPT | Performed by: PSYCHIATRY & NEUROLOGY

## 2022-09-08 NOTE — PROGRESS NOTES
Endovascular Neurosurgery - [facility]  [address] [place] [address], [address], [address] [place] [address]  P: [phone]  F: [phone]      Dear Dr. Aleksandra Alvarado,    HPI:     HPI    Umesh Rush is a 71 y.o. male history of dm and htn who had recent evaluation at [facility] for lightheaded and dizziness, with mild headaches. Manish Amend He can ignore usually without difficulty. He has noticed they have been slightly more noticeable in the last few months, usually noted on hot days and out in the yard working. Otherwise no family history of aneurysm, no thunderclap headaches and no smoking history. 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 He was referred to for further evaluation and treatment planning by Ernestina Lyles PA-C. He otherwise has been doing well since discharge 8- with no new concerns. Allergies   Allergen Reactions    Cialis [Tadalafil]      Leg pain/cramps    Ranitidine      Leg spasms     Current Outpatient Medications   Medication Sig Dispense Refill    omeprazole (PRILOSEC) 40 MG delayed release capsule Take 1 capsule by mouth every morning (before breakfast) 30 capsule 5    blood glucose test strips (TRUE METRIX BLOOD GLUCOSE TEST) strip 1 each by Other route daily Type 2 DM not on insulin 50 each 5    naproxen (NAPROSYN) 500 MG tablet Take 1 tablet by mouth twice daily as needed for pain 180 tablet 0    metFORMIN (GLUCOPHAGE) 500 MG tablet Take 1 tablet by mouth daily (with breakfast) 90 tablet 1    tamsulosin (FLOMAX) 0.4 MG capsule Take 1 capsule by mouth daily 90 capsule 3    simvastatin (ZOCOR) 40 MG tablet Take 1 tablet by mouth nightly 90 tablet 1    blood glucose monitor strips Test one times a day & as needed for symptoms of irregular blood glucose.  Type 2 DM not on insulin 50 strip 3    Lancets MISC 1 each by Does not apply route daily 100 each 5    TRUEplus Lancets 30G MISC USE AS DIRECTED ONCE DAILY 100 each 3    Multiple Vitamin (MULTI VITAMIN DAILY) TABS Take 1 tablet by mouth daily      amLODIPine-benazepril (LOTREL) 5-20 MG per capsule Take 1 capsule by mouth in the morning. 30 capsule 1     No current facility-administered medications for this visit. Past Medical History:   Diagnosis Date    H/O hepatitis       Past Surgical History:   Procedure Laterality Date    EYE SURGERY Bilateral     cataract    TONSILLECTOMY       Family History   Problem Relation Age of Onset    Cancer Mother         breast cancer    Diabetes Mother     Other Mother         renal disease    Diabetes Father     Diabetes Sister     Diabetes Brother     Kidney stones Brother     Diabetes Maternal Aunt      Social History     Tobacco Use    Smoking status: Former     Packs/day: 0.10     Years: 1.00     Pack years: 0.10     Types: Cigarettes     Quit date: 1981     Years since quittin.7    Smokeless tobacco: Never   Substance Use Topics    Alcohol use: No     Alcohol/week: 0.0 standard drinks        Subjective:     Review of Systems   Constitutional:  Negative for fever and unexpected weight change. HENT:  Negative for voice change. Eyes:  Negative for photophobia and visual disturbance. Respiratory:  Negative for cough and shortness of breath. Cardiovascular:  Negative for chest pain and palpitations. Gastrointestinal:  Negative for nausea and vomiting. Musculoskeletal:  Negative for neck stiffness. Skin:  Negative for color change and pallor. Neurological:  Positive for headaches. Negative for weakness. Psychiatric/Behavioral:  Negative for confusion and decreased concentration.         Objective:     /66   Pulse 97   Temp 97.7 °F (36.5 °C) (Temporal)   Ht 5' 5\" (1.651 m)   Wt 175 lb (79.4 kg)   SpO2 95%   BMI 29.12 kg/m²   Physical Exam  Gen: Sitting in chair, nad  CV:RRR  NEURO: alert and oriented x 3 intact language attention and knowledge  CN: eomi, perrl, v1-v3 intact no facial asymmetry, midline tongue  Motor 5/5 bue/ble  COORD: no dysmetria  Sensory: intact lt          Assessment:        Kenya Chandra is a 71 y.o. male who has a history of htn and dm with incidental right superior hypophseal artery aneurysm. Diagnosis Orders   1. Right internal carotid artery aneurysm        2. Lightheadedness            Recommendations:      Return in about 12 days (around 2022) for cerebral angiogram.      Plan for cerebral angiogram with iv moderate sedation for better evaluation of the aneurysm and treatment planning on   Normotensive sbp <130  Monitor for any recurrent episodes of light headedness    New Patient Visit Time:  46 minutes  Time Spent in Counselin minutes  Greater than 50% of the time in this visit was spent counseling and coordinating care of this patient. Patient given educational materials - see patient instructions. Discussed use, benefit, and side effects of prescribed medications. Personally reviewed imaging with patients and all questions answered. Pt voiced understanding. Patient agreed with treatment plan. Follow up as directed above. If you have any questions, please do not hesitate to call me.   I look forward to following Kenya Chandra      Sincerely,        Sissy Schmid MD, MD  Electronically signed by Sissy Schmid MD, MD on 2022 at 1:27 PM

## 2022-09-09 PROBLEM — I67.1 RIGHT INTERNAL CAROTID ARTERY ANEURYSM: Status: ACTIVE | Noted: 2022-09-09

## 2022-09-09 ASSESSMENT — ENCOUNTER SYMPTOMS
VOICE CHANGE: 0
COLOR CHANGE: 0
NAUSEA: 0
VOMITING: 0
PHOTOPHOBIA: 0
COUGH: 0
SHORTNESS OF BREATH: 0

## 2022-09-20 ENCOUNTER — HOSPITAL ENCOUNTER (OUTPATIENT)
Dept: INTERVENTIONAL RADIOLOGY/VASCULAR | Age: 72
Discharge: HOME | End: 2022-09-22
Payer: COMMERCIAL

## 2022-09-20 VITALS
OXYGEN SATURATION: 95 % | HEART RATE: 68 BPM | DIASTOLIC BLOOD PRESSURE: 62 MMHG | SYSTOLIC BLOOD PRESSURE: 134 MMHG | RESPIRATION RATE: 11 BRPM

## 2022-09-20 DIAGNOSIS — I67.1 CEREBRAL ANEURYSM, NONRUPTURED: ICD-10-CM

## 2022-09-20 LAB
ANION GAP: 9 MMOL/L (ref 7–16)
GFR NON-AFRICAN AMERICAN: >60 ML/MIN
GFR SERPL CREATININE-BSD FRML MDRD: >60 ML/MIN
GFR SERPL CREATININE-BSD FRML MDRD: NORMAL ML/MIN/{1.73_M2}
GLUCOSE BLD-MCNC: 143 MG/DL (ref 74–100)
POC BUN: 19 MG/DL (ref 8–26)
POC CHLORIDE: 107 MMOL/L (ref 98–107)
POC CREATININE: 0.66 MG/DL (ref 0.51–1.19)
POC HEMATOCRIT: 44 % (ref 41–53)
POC HEMOGLOBIN: 15 G/DL (ref 13.5–17.5)
POC POTASSIUM: 4.2 MMOL/L (ref 3.5–4.5)
POC SODIUM: 143 MMOL/L (ref 138–146)
POC TCO2: 28 MMOL/L (ref 22–30)

## 2022-09-20 PROCEDURE — 36226 PLACE CATH VERTEBRAL ART: CPT

## 2022-09-20 PROCEDURE — 99152 MOD SED SAME PHYS/QHP 5/>YRS: CPT

## 2022-09-20 PROCEDURE — 82947 ASSAY GLUCOSE BLOOD QUANT: CPT

## 2022-09-20 PROCEDURE — 36226 PLACE CATH VERTEBRAL ART: CPT | Performed by: PSYCHIATRY & NEUROLOGY

## 2022-09-20 PROCEDURE — 76377 3D RENDER W/INTRP POSTPROCES: CPT

## 2022-09-20 PROCEDURE — 99214 OFFICE O/P EST MOD 30 MIN: CPT | Performed by: PSYCHIATRY & NEUROLOGY

## 2022-09-20 PROCEDURE — 76377 3D RENDER W/INTRP POSTPROCES: CPT | Performed by: PSYCHIATRY & NEUROLOGY

## 2022-09-20 PROCEDURE — C1769 GUIDE WIRE: HCPCS

## 2022-09-20 PROCEDURE — 36224 PLACE CATH CAROTD ART: CPT

## 2022-09-20 PROCEDURE — 82565 ASSAY OF CREATININE: CPT

## 2022-09-20 PROCEDURE — 80051 ELECTROLYTE PANEL: CPT

## 2022-09-20 PROCEDURE — 6360000002 HC RX W HCPCS: Performed by: STUDENT IN AN ORGANIZED HEALTH CARE EDUCATION/TRAINING PROGRAM

## 2022-09-20 PROCEDURE — C1760 CLOSURE DEV, VASC: HCPCS

## 2022-09-20 PROCEDURE — 6370000000 HC RX 637 (ALT 250 FOR IP): Performed by: PSYCHIATRY & NEUROLOGY

## 2022-09-20 PROCEDURE — 84520 ASSAY OF UREA NITROGEN: CPT

## 2022-09-20 PROCEDURE — 6370000000 HC RX 637 (ALT 250 FOR IP)

## 2022-09-20 PROCEDURE — C1894 INTRO/SHEATH, NON-LASER: HCPCS

## 2022-09-20 PROCEDURE — 2709999900 HC NON-CHARGEABLE SUPPLY

## 2022-09-20 PROCEDURE — 6360000004 HC RX CONTRAST MEDICATION: Performed by: PSYCHIATRY & NEUROLOGY

## 2022-09-20 PROCEDURE — 2580000003 HC RX 258: Performed by: PSYCHIATRY & NEUROLOGY

## 2022-09-20 PROCEDURE — 85014 HEMATOCRIT: CPT

## 2022-09-20 PROCEDURE — C1887 CATHETER, GUIDING: HCPCS

## 2022-09-20 PROCEDURE — 76937 US GUIDE VASCULAR ACCESS: CPT | Performed by: PSYCHIATRY & NEUROLOGY

## 2022-09-20 PROCEDURE — 36224 PLACE CATH CAROTD ART: CPT | Performed by: PSYCHIATRY & NEUROLOGY

## 2022-09-20 PROCEDURE — 99153 MOD SED SAME PHYS/QHP EA: CPT

## 2022-09-20 PROCEDURE — 7100000000 HC PACU RECOVERY - FIRST 15 MIN

## 2022-09-20 PROCEDURE — 6360000002 HC RX W HCPCS: Performed by: PSYCHIATRY & NEUROLOGY

## 2022-09-20 PROCEDURE — 99152 MOD SED SAME PHYS/QHP 5/>YRS: CPT | Performed by: PSYCHIATRY & NEUROLOGY

## 2022-09-20 PROCEDURE — 7100000001 HC PACU RECOVERY - ADDTL 15 MIN

## 2022-09-20 RX ORDER — SODIUM CHLORIDE 0.9 % (FLUSH) 0.9 %
5-40 SYRINGE (ML) INJECTION PRN
Status: CANCELLED | OUTPATIENT
Start: 2022-09-20

## 2022-09-20 RX ORDER — ACETAMINOPHEN 325 MG/1
650 TABLET ORAL EVERY 4 HOURS PRN
Status: DISCONTINUED | OUTPATIENT
Start: 2022-09-20 | End: 2022-09-23 | Stop reason: HOSPADM

## 2022-09-20 RX ORDER — ONDANSETRON 4 MG/1
4 TABLET, ORALLY DISINTEGRATING ORAL EVERY 8 HOURS PRN
Status: DISCONTINUED | OUTPATIENT
Start: 2022-09-20 | End: 2022-09-23 | Stop reason: HOSPADM

## 2022-09-20 RX ORDER — FENTANYL CITRATE 50 UG/ML
INJECTION, SOLUTION INTRAMUSCULAR; INTRAVENOUS
Status: COMPLETED | OUTPATIENT
Start: 2022-09-20 | End: 2022-09-20

## 2022-09-20 RX ORDER — MIDAZOLAM HYDROCHLORIDE 2 MG/2ML
INJECTION, SOLUTION INTRAMUSCULAR; INTRAVENOUS
Status: COMPLETED | OUTPATIENT
Start: 2022-09-20 | End: 2022-09-20

## 2022-09-20 RX ORDER — SODIUM CHLORIDE 0.9 % (FLUSH) 0.9 %
5-40 SYRINGE (ML) INJECTION EVERY 12 HOURS SCHEDULED
Status: CANCELLED | OUTPATIENT
Start: 2022-09-20

## 2022-09-20 RX ORDER — IODIXANOL 320 MG/ML
100 INJECTION, SOLUTION INTRAVASCULAR
Status: COMPLETED | OUTPATIENT
Start: 2022-09-20 | End: 2022-09-20

## 2022-09-20 RX ORDER — SODIUM CHLORIDE 9 MG/ML
INJECTION, SOLUTION INTRAVENOUS PRN
Status: DISCONTINUED | OUTPATIENT
Start: 2022-09-20 | End: 2022-09-23 | Stop reason: HOSPADM

## 2022-09-20 RX ORDER — ONDANSETRON 2 MG/ML
4 INJECTION INTRAMUSCULAR; INTRAVENOUS EVERY 6 HOURS PRN
Status: DISCONTINUED | OUTPATIENT
Start: 2022-09-20 | End: 2022-09-23 | Stop reason: HOSPADM

## 2022-09-20 RX ADMIN — ACETAMINOPHEN 650 MG: 325 TABLET ORAL at 14:05

## 2022-09-20 RX ADMIN — MIDAZOLAM HYDROCHLORIDE 1 MG: 1 INJECTION, SOLUTION INTRAMUSCULAR; INTRAVENOUS at 09:13

## 2022-09-20 RX ADMIN — FENTANYL CITRATE 50 MCG: 50 INJECTION, SOLUTION INTRAMUSCULAR; INTRAVENOUS at 09:13

## 2022-09-20 RX ADMIN — SODIUM CHLORIDE: 9 INJECTION, SOLUTION INTRAVENOUS at 08:10

## 2022-09-20 RX ADMIN — Medication 2000 MG: at 08:55

## 2022-09-20 RX ADMIN — IODIXANOL 78 ML: 320 INJECTION, SOLUTION INTRAVASCULAR at 10:34

## 2022-09-20 ASSESSMENT — PAIN SCALES - GENERAL
PAINLEVEL_OUTOF10: 0
PAINLEVEL_OUTOF10: 4

## 2022-09-20 ASSESSMENT — PAIN DESCRIPTION - LOCATION: LOCATION: HEAD

## 2022-09-20 NOTE — OR NURSING
Hemostasis maintained. Dressing to right groin. Pulses palpable. Neuro assessment unchanged. Report called and patient back to [facility].

## 2022-09-20 NOTE — PROGRESS NOTES
Discharge instructions reviewed with patient and wife. Both verbalize understanding. Written discharge papers given to patient. Discharged home per ambulatory with wife and all belongings.

## 2022-09-20 NOTE — H&P
Endovascular Neurosurgery History and Physical      Reason for evaluation: DSA for right superior hypophyseal artery aneurysm    SUBJECTIVE:   History of Chief Complaint:      Daiana Stuart is a 71 y.o. male history of DM, HTN who had recent evaluation at [facility] for lightheaded and dizziness, with mild headaches. 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 Otherwise no family history of aneurysm, no thunderclap headaches and no smoking history. He was referred to for further evaluation and treatment planning by Jennifer Herrmann PA-C. Allergies  is allergic to cialis [tadalafil] and ranitidine. Medications  Prior to Admission medications    Medication Sig Start Date End Date Taking? Authorizing Provider   amLODIPine-benazepril (LOTREL) 5-20 MG per capsule Take 1 capsule by mouth in the morning. 8/1/22 8/31/22  Nikos Quiñonez MD   omeprazole (PRILOSEC) 40 MG delayed release capsule Take 1 capsule by mouth every morning (before breakfast) 8/1/22   Nikos Quiñonez MD   blood glucose test strips (TRUE METRIX BLOOD GLUCOSE TEST) strip 1 each by Other route daily Type 2 DM not on insulin 7/11/22   Nikos Quiñonez MD   naproxen (NAPROSYN) 500 MG tablet Take 1 tablet by mouth twice daily as needed for pain 6/15/22   Nikos Quiñonez MD   metFORMIN (GLUCOPHAGE) 500 MG tablet Take 1 tablet by mouth daily (with breakfast) 5/6/22   Nikos Quiñonez MD   tamsulosin [facility]) 0.4 MG capsule Take 1 capsule by mouth daily 3/7/22   Rhona Snellen, MD   simvastatin (ZOCOR) 40 MG tablet Take 1 tablet by mouth nightly 1/31/22   Nikos Quiñonez MD   blood glucose monitor strips Test one times a day & as needed for symptoms of irregular blood glucose.  Type 2 DM not on insulin 7/19/21   Nikos Quiñonez MD   Lancets MISC 1 each by Does not apply route daily 7/12/21   Nikos Quiñonez MD   TRUEplus Lancets 30G MISC USE AS DIRECTED ONCE DAILY 6/8/20   Isabelle Malloy MD   Multiple Vitamin (MULTI VITAMIN DAILY) TABS Take 1 tablet by mouth daily    Historical Provider, MD    Scheduled Meds:  Continuous Infusions:  PRN Meds:.  Past Medical History   has a past medical history of H/O hepatitis. Past Surgical History   has a past surgical history that includes eye surgery (Bilateral, 2011) and Tonsillectomy. Social History   reports that he quit smoking about 41 years ago. His smoking use included cigarettes. He has a 0.10 pack-year smoking history. He has never used smokeless tobacco.   reports no history of alcohol use. reports no history of drug use. Family History  family history includes Cancer in his mother; Diabetes in his brother, father, maternal aunt, mother, and sister; Kidney stones in his brother; Other in his mother. Review of Systems:  CONSTITUTIONAL:  negative for fevers, chills, fatigue and malaise    EYES:  negative for double vision, blurred vision and photophobia     HEENT:  negative for tinnitus, epistaxis and sore throat    RESPIRATORY:  negative for cough, shortness of breath, wheezing    CARDIOVASCULAR:  negative for chest pain, palpitations, syncope, edema    GASTROINTESTINAL:  negative for nausea, vomiting    GENITOURINARY:  negative for incontinence    MUSCULOSKELETAL:  negative for neck or back pain    NEUROLOGICAL:  Negative for weakness and tingling  negative for headaches and dizziness    PSYCHIATRIC:  negative for anxiety      Review of systems otherwise negative. OBJECTIVE:   There were no vitals filed for this visit. General:  Gen: normal habitus, NAD  HEENT: NCAT, mucosa moist  Cvs: RRR, S1 S2 normal  Resp: symmetric unlabored breathing  Abd: s/nd/nt  Ext: no edema  Skin: no lesions seen, warm and dry    Neuro:  Gen: awake and alert, oriented x4. Lang/speech: no aphasia or dysarthria. Follows commands.   CN: PERRL, EOMI, VFF, V1-3 intact, face symmetric, hearing intact, shoulder shrug symmetric, tongue midline  Motor: grossly 5/5 UE and LE b/l  Sense: LT intact in all 4 ext. Coord: FTN and HTS intact b/l  DTR: deferred  Gait: narrow base gait    NIH Stroke Scale:   1a  Level of consciousness: 0 - alert; keenly responsive   1b. LOC questions:  0 - answers both questions correctly   1c. LOC commands: 0 - performs both tasks correctly   2. Best Gaze: 0 - normal   3. Visual: 0 - no visual loss   4. Facial Palsy: 0 - normal symmetric movement   5a. Motor left arm: 0 - no drift, limb holds 90 (or 45) degrees for full 10 seconds   5b. Motor right arm: 0 - no drift, limb holds 90 (or 45) degrees for full 10 seconds   6a. Motor left le - no drift; leg holds 30 degree position for full 5 seconds   6b  Motor right le - no drift; leg holds 30 degree position for full 5 seconds   7. Limb Ataxia: 0 - absent   8. Sensory: 0 - normal; no sensory loss   9. Best Language:  0 - no aphasia, normal   10. Dysarthria: 0 - normal   11. Extinction and Inattention: 0 - no abnormality         Total:   0     MRS: 0      LABS:   Reviewed. No results found for: HGB, WBC, PLT, NA, BUN, CREATININE, AST, ALT, MG, APTT, INR   Lab Results   Component Value Date/Time    COVID19 Comment 2022 07:59 AM       RADIOLOGY:   Images were personally reviewed including:    CTA done in outside hospital 22: R ICA 3mm paraclinoid aneurysm    ASSESSMENT:     Romaine Qiu is a 71 y.o. male who has a history of htn and dm with incidental right superior hypophseal artery aneurysm. PLAN:     - will proceed with DSA  - Risks and benefits discussed, risks include but are not limited to bruising, stroke, subarachnoid hemorrhage, death, retroperitoneal hematoma, pseudoaneurysm, lower extremity/renal/peripheral vascular compromise, informed consent obtained. Case discussed with Dr. Daryl Lindsey attending.     Miriam Blakely MD PGY 7  Stroke, [facility] [address] [place]  Electronically signed 9/20/2022 at 7:14 AM

## 2022-09-20 NOTE — BRIEF OP NOTE
[phone]  Stroke, [facility]  [address]  Electronically signed 9/20/2022 at 10:43 AM

## 2022-09-28 RX ORDER — AMLODIPINE BESYLATE AND BENAZEPRIL HYDROCHLORIDE 5; 20 MG/1; MG/1
1 CAPSULE ORAL DAILY
Qty: 30 CAPSULE | Refills: 0 | Status: SHIPPED | OUTPATIENT
Start: 2022-09-28 | End: 2022-11-01

## 2022-10-07 ENCOUNTER — OFFICE VISIT (OUTPATIENT)
Dept: NEUROLOGY | Age: 72
End: 2022-10-07
Payer: COMMERCIAL

## 2022-10-07 VITALS
HEIGHT: 65 IN | SYSTOLIC BLOOD PRESSURE: 135 MMHG | DIASTOLIC BLOOD PRESSURE: 76 MMHG | BODY MASS INDEX: 29.16 KG/M2 | OXYGEN SATURATION: 96 % | HEART RATE: 93 BPM | WEIGHT: 175 LBS

## 2022-10-07 DIAGNOSIS — I72.9 ANEURYSM (HCC): Primary | ICD-10-CM

## 2022-10-07 PROCEDURE — 99215 OFFICE O/P EST HI 40 MIN: CPT | Performed by: STUDENT IN AN ORGANIZED HEALTH CARE EDUCATION/TRAINING PROGRAM

## 2022-10-07 PROCEDURE — 1123F ACP DISCUSS/DSCN MKR DOCD: CPT | Performed by: STUDENT IN AN ORGANIZED HEALTH CARE EDUCATION/TRAINING PROGRAM

## 2022-10-07 RX ORDER — CLOPIDOGREL BISULFATE 75 MG/1
75 TABLET ORAL DAILY
Qty: 30 TABLET | Refills: 3 | Status: SHIPPED | OUTPATIENT
Start: 2022-10-07

## 2022-10-07 RX ORDER — ASPIRIN 325 MG
325 TABLET, DELAYED RELEASE (ENTERIC COATED) ORAL DAILY
Qty: 30 TABLET | Refills: 1 | Status: SHIPPED | OUTPATIENT
Start: 2022-10-07 | End: 2023-10-07

## 2022-10-07 NOTE — PROGRESS NOTES
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 Patient underwent DSA which confirmed the finding. Patient presented today post-DSA follow up. Patient complains of headache about 1-2 times/ month. Patient is a ex-smoker. No family history of intracranial aneurysm. Allergies  is allergic to cialis [tadalafil] and ranitidine. Medications  Prior to Admission medications    Medication Sig Start Date End Date Taking? Authorizing Provider   aspirin 325 MG EC tablet Take 1 tablet by mouth daily 10/7/22 10/7/23 Yes Fahad aGlvan MD   clopidogrel (PLAVIX) 75 MG tablet Take 1 tablet by mouth daily 10/7/22  Yes Fahad Galvan MD   amLODIPine-benazepril (LOTREL) 5-20 MG per capsule Take 1 capsule by mouth in the morning 9/28/22 10/28/22 Yes FAISAL Hernnadez   blood glucose test strips (TRUE METRIX BLOOD GLUCOSE TEST) strip 1 each by Other route daily Type 2 DM not on insulin 7/11/22  Yes Blair Patel MD   naproxen (NAPROSYN) 500 MG tablet Take 1 tablet by mouth twice daily as needed for pain 6/15/22  Yes Blair Patel MD   metFORMIN (GLUCOPHAGE) 500 MG tablet Take 1 tablet by mouth daily (with breakfast) 5/6/22  Yes Blair Patel MD   tamsulosin [facility]) 0.4 MG capsule Take 1 capsule by mouth daily 3/7/22  Yes Brent Jackson MD   simvastatin (ZOCOR) 40 MG tablet Take 1 tablet by mouth nightly 1/31/22  Yes Blair Patel MD   blood glucose monitor strips Test one times a day & as needed for symptoms of irregular blood glucose.  Type 2 DM not on insulin 7/19/21  Yes Blair Patel MD   Lancets MISC 1 each by Does not apply route daily 7/12/21  Yes Blair Patel MD   TRUEplus Lancets 30G MISC USE AS DIRECTED ONCE DAILY 6/8/20  Yes Michelle Martinez MD   Multiple Vitamin (MULTI VITAMIN DAILY) TABS Take 1 tablet by mouth daily   Yes Historical Provider, MD   omeprazole (PRILOSEC) 40 MG delayed release capsule Take 1 capsule by mouth every morning (before breakfast)  Patient not taking: Reported on 10/7/2022 8/1/22   Michelle Martinez MD    Scheduled Meds:  Continuous Infusions:  PRN Meds:.  Past Medical History   has a past medical history of H/O hepatitis. Past Surgical History   has a past surgical history that includes eye surgery (Bilateral, 01/01/2011); Tonsillectomy; and Cerebral angiogram.  Social History   reports that he quit smoking about 41 years ago. His smoking use included cigarettes. He has a 0.10 pack-year smoking history. He has never used smokeless tobacco.   reports no history of alcohol use. reports no history of drug use. Family History  family history includes Cancer in his mother; Diabetes in his brother, father, maternal aunt, mother, and sister; Kidney stones in his brother; Other in his mother. Review of Systems:  CONSTITUTIONAL:  negative for fevers, chills, fatigue and malaise    EYES:  negative for double vision, blurred vision and photophobia     HEENT:  negative for tinnitus, epistaxis and sore throat    RESPIRATORY:  negative for cough, shortness of breath, wheezing    CARDIOVASCULAR:  negative for chest pain, palpitations, syncope, edema    GASTROINTESTINAL:  negative for nausea, vomiting    GENITOURINARY:  negative for incontinence    MUSCULOSKELETAL:  negative for neck or back pain    NEUROLOGICAL:  Negative for weakness and tingling  negative for headaches and dizziness    PSYCHIATRIC:  negative for anxiety      Review of systems otherwise negative.       OBJECTIVE:     Vitals:    10/07/22 1452   BP: 135/76   Pulse: 93   SpO2: 96%        General:  Gen: normal habitus, NAD  HEENT: NCAT, mucosa moist  Cvs: RRR, S1 S2 normal  Resp: symmetric unlabored breathing  Abd: s/nd/nt  Ext: no edema  Skin: no lesions seen, warm and dry    Neuro:  Gen: awake and alert, oriented x3. Lang/speech: no aphasia or dysarthria. Follows commands. CN: PERRL, EOMI, VFF, V1-3 intact, face symmetric, hearing intact, shoulder shrug symmetric, tongue midline  Motor: grossly 5/5 UE and LE b/l  Sense: LT intact in all 4 ext. Coord: FTN and HTS intact b/l  DTR: deferred  Gait: narrow base gait    NIH Stroke Scale:   1a  Level of consciousness: 0 - alert; keenly responsive   1b. LOC questions:  0 - answers both questions correctly   1c. LOC commands: 0 - performs both tasks correctly   2. Best Gaze: 0 - normal   3. Visual: 0 - no visual loss   4. Facial Palsy: 0 - normal symmetric movement   5a. Motor left arm: 0 - no drift, limb holds 90 (or 45) degrees for full 10 seconds   5b. Motor right arm: 0 - no drift, limb holds 90 (or 45) degrees for full 10 seconds   6a. Motor left le - no drift; leg holds 30 degree position for full 5 seconds   6b  Motor right le - no drift; leg holds 30 degree position for full 5 seconds   7. Limb Ataxia: 0 - absent   8. Sensory: 0 - normal; no sensory loss   9. Best Language:  0 - no aphasia, normal   10. Dysarthria: 0 - normal   11. Extinction and Inattention: 0 - no abnormality         Total:   0     MRS: 0      LABS:   Reviewed. Lab Results   Component Value Date    CREATININE 0.66 2022      Lab Results   Component Value Date/Time    COVID19 Comment 2022 07:59 AM       RADIOLOGY:   Images were personally reviewed including:  IR:   1. Right wide-necked para-ophthalmic? ICA? aneurysm, inferior medially oriented, width 2.38 mm x length 2.43mm x1.69mm height   2. Left internal carotid artery plaque, less than 10% stenosis, hemodynamically insignificant   3. Functional fetal?right? PCA   4.   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 Patient underwent DSA which confirmed the finding. Patient presented today post-DSA follow up. After going over the various options of treating the aneurysm, patient decided to go ahead with endovascular flow diverter placement. Risks and benefits were discussed. PLAN:   --Endovascular FD embolization of the right ICA superior hypophyseal aneurysm with surpass evolve with   --Procedure scheduled on 01/24/2023 under GA at 9:00 am   --ASA and Plavix to start 7 days prior to the procedure. Orders placed   --SBP goal 100-140 mm Hg     Case discussed with Dr. Jaylin Sylvester attending. Governor MD Inocencio    [facility]  Criselda Kilgore  Electronically signed 10/7/2022 at 4:30 PM    Neurology, Stroke, and Neurointerventional Attending Resident Attestation:  DOS 10/7/2022  I obtained brief history, examined the patient,reviewed the residents note and agree with the documented findings and plan of care. Any areas of disagreement are noted on the chart. I agree with the chief complaint, past medical history, past surgical history, allergies, medications, social and family history as documented unless otherwise noted above. Total time for evaluation including obtaining history, chart review, physical examination, management, evaluation, imaging reviews and discussion with the clinical team and family: 36 minutes    Margarito Gill MD, [place] [address]  Office [phone].  Cell [phone]  [facility]  Criselda Kilgore

## 2022-10-10 DIAGNOSIS — E78.49 OTHER HYPERLIPIDEMIA: ICD-10-CM

## 2022-10-10 DIAGNOSIS — E11.9 TYPE 2 DIABETES MELLITUS WITHOUT COMPLICATION, WITHOUT LONG-TERM CURRENT USE OF INSULIN (HCC): ICD-10-CM

## 2022-10-10 RX ORDER — SIMVASTATIN 40 MG
40 TABLET ORAL NIGHTLY
Qty: 90 TABLET | Refills: 3 | Status: SHIPPED | OUTPATIENT
Start: 2022-10-10 | End: 2022-11-29 | Stop reason: SDUPTHER

## 2022-11-01 RX ORDER — AMLODIPINE BESYLATE AND BENAZEPRIL HYDROCHLORIDE 5; 20 MG/1; MG/1
1 CAPSULE ORAL DAILY
Qty: 30 CAPSULE | Refills: 5 | Status: SHIPPED | OUTPATIENT
Start: 2022-11-01 | End: 2022-11-14 | Stop reason: DRUGHIGH

## 2022-11-14 ENCOUNTER — OFFICE VISIT (OUTPATIENT)
Dept: PRIMARY CARE CLINIC | Age: 72
End: 2022-11-14
Payer: COMMERCIAL

## 2022-11-14 VITALS
SYSTOLIC BLOOD PRESSURE: 118 MMHG | WEIGHT: 178.6 LBS | BODY MASS INDEX: 29.76 KG/M2 | OXYGEN SATURATION: 97 % | HEART RATE: 90 BPM | HEIGHT: 65 IN | DIASTOLIC BLOOD PRESSURE: 68 MMHG

## 2022-11-14 DIAGNOSIS — I10 ESSENTIAL HYPERTENSION: ICD-10-CM

## 2022-11-14 DIAGNOSIS — E11.9 TYPE 2 DIABETES MELLITUS WITHOUT COMPLICATION, WITHOUT LONG-TERM CURRENT USE OF INSULIN (HCC): Primary | ICD-10-CM

## 2022-11-14 LAB — HBA1C MFR BLD: 6.4 %

## 2022-11-14 PROCEDURE — 99214 OFFICE O/P EST MOD 30 MIN: CPT | Performed by: FAMILY MEDICINE

## 2022-11-14 PROCEDURE — 3074F SYST BP LT 130 MM HG: CPT | Performed by: FAMILY MEDICINE

## 2022-11-14 PROCEDURE — 3078F DIAST BP <80 MM HG: CPT | Performed by: FAMILY MEDICINE

## 2022-11-14 PROCEDURE — 1123F ACP DISCUSS/DSCN MKR DOCD: CPT | Performed by: FAMILY MEDICINE

## 2022-11-14 PROCEDURE — 3044F HG A1C LEVEL LT 7.0%: CPT | Performed by: FAMILY MEDICINE

## 2022-11-14 PROCEDURE — 83036 HEMOGLOBIN GLYCOSYLATED A1C: CPT | Performed by: FAMILY MEDICINE

## 2022-11-14 RX ORDER — AMLODIPINE BESYLATE AND BENAZEPRIL HYDROCHLORIDE 5; 10 MG/1; MG/1
1 CAPSULE ORAL DAILY
Qty: 30 CAPSULE | Refills: 3 | Status: SHIPPED | OUTPATIENT
Start: 2022-11-14

## 2022-11-14 ASSESSMENT — PATIENT HEALTH QUESTIONNAIRE - PHQ9
SUM OF ALL RESPONSES TO PHQ QUESTIONS 1-9: 0
SUM OF ALL RESPONSES TO PHQ9 QUESTIONS 1 & 2: 0
2. FEELING DOWN, DEPRESSED OR HOPELESS: 0
SUM OF ALL RESPONSES TO PHQ QUESTIONS 1-9: 0
1. LITTLE INTEREST OR PLEASURE IN DOING THINGS: 0

## 2022-11-14 ASSESSMENT — ENCOUNTER SYMPTOMS: RESPIRATORY NEGATIVE: 1

## 2022-11-14 NOTE — PROGRESS NOTES
[facility]  [address]  [facility] [address]  Dept: [phone]    Gabi De La Vega is a 71 y.o. male Established patient, who presents today for his medical conditions/complaintsas noted below.       Chief Complaint   Patient presents with    Diabetes     Last a1c 6.7       HPI:     HPI  Home sugars  114-146 sugars  No low sugars    Todays A1C 6.4    Reviewed prior notes  neurosurgery  Reviewed previous Labs    LDL Calculated (mg/dL)   Date Value   2021 80   2020 61   2019 83       (goal LDL is <100)   Hemoglobin A1C (%)   Date Value   2022 6.4     BP Readings from Last 3 Encounters:   22 118/68   10/07/22 135/76   22 134/62          (goal 120/80)    Past Medical History:   Diagnosis Date    H/O hepatitis       Past Surgical History:   Procedure Laterality Date    CEREBRAL ANGIOGRAM      EYE SURGERY Bilateral 2011    cataract    TONSILLECTOMY         Family History   Problem Relation Age of Onset    Cancer Mother         breast cancer    Diabetes Mother     Other Mother         renal disease    Diabetes Father     Diabetes Sister     Diabetes Brother     Kidney stones Brother     Diabetes Maternal Aunt        Social History     Tobacco Use    Smoking status: Former     Packs/day: 0.10     Years: 1.00     Pack years: 0.10     Types: Cigarettes     Quit date: 1981     Years since quittin.8    Smokeless tobacco: Never   Substance Use Topics    Alcohol use: No     Alcohol/week: 0.0 standard drinks      Current Outpatient Medications   Medication Sig Dispense Refill    amLODIPine-benazepril (LOTREL) 5-10 MG per capsule Take 1 capsule by mouth daily 30 capsule 3    simvastatin (ZOCOR) 40 MG tablet Take 1 tablet by mouth nightly 90 tablet 3    aspirin 325 MG EC tablet Take 1 tablet by mouth daily 30 tablet 1    blood glucose test strips (TRUE METRIX BLOOD GLUCOSE TEST) strip 1 each by Other route daily Type 2 DM not on insulin 50 each 5    naproxen (NAPROSYN) 500 MG tablet Take 1 tablet by mouth twice daily as needed for pain 180 tablet 0    metFORMIN (GLUCOPHAGE) 500 MG tablet Take 1 tablet by mouth daily (with breakfast) 90 tablet 1    tamsulosin (FLOMAX) 0.4 MG capsule Take 1 capsule by mouth daily 90 capsule 3    blood glucose monitor strips Test one times a day & as needed for symptoms of irregular blood glucose. Type 2 DM not on insulin 50 strip 3    Lancets MISC 1 each by Does not apply route daily 100 each 5    TRUEplus Lancets 30G MISC USE AS DIRECTED ONCE DAILY 100 each 3    Multiple Vitamin (MULTI VITAMIN DAILY) TABS Take 1 tablet by mouth daily       No current facility-administered medications for this visit. Allergies   Allergen Reactions    Cialis [Tadalafil]      Leg pain/cramps    Ranitidine      Leg spasms       Health Maintenance   Topic Date Due    DTaP/Tdap/Td vaccine (1 - Tdap) Never done    Shingles vaccine (1 of 2) Never done    Lipids  07/19/2022    Diabetic retinal exam  10/25/2022    Annual Wellness Visit (AWV)  02/01/2023    Depression Screen  08/01/2023    Diabetic foot exam  11/14/2023    A1C test (Diabetic or Prediabetic)  11/14/2023    Colorectal Cancer Screen  02/07/2029    Flu vaccine  Completed    Pneumococcal 65+ years Vaccine  Completed    COVID-19 Vaccine  Completed    AAA screen  Completed    Hepatitis C screen  Completed    Hepatitis A vaccine  Aged Out    Hib vaccine  Aged Out    Meningococcal (ACWY) vaccine  Aged Out       Subjective:      Review of Systems   Constitutional: Negative. Respiratory: Negative. Cardiovascular: Negative. Neurological:  Negative for dizziness and light-headedness. Some numbness in feet/big toes when he first gets out of bed. Better after walking around for a few minutes.      Objective:     /68   Pulse 90   Ht 5' 5\" (1.651 m)   Wt 178 lb 9.6 oz (81 kg)   SpO2 97%   BMI 29.72 kg/m²   Physical Exam  Vitals and nursing note reviewed. Constitutional:       General: He is not in acute distress. Appearance: He is well-developed. He is not ill-appearing. HENT:      Head: Normocephalic and atraumatic. Right Ear: External ear normal.      Left Ear: External ear normal.   Eyes:      General: No scleral icterus. Right eye: No discharge. Left eye: No discharge. Conjunctiva/sclera: Conjunctivae normal.   Neck:      Thyroid: No thyromegaly. Trachea: No tracheal deviation. Cardiovascular:      Rate and Rhythm: Normal rate and regular rhythm. Pulses:           Dorsalis pedis pulses are 2+ on the right side and 2+ on the left side. Posterior tibial pulses are 2+ on the right side and 2+ on the left side. Heart sounds: Normal heart sounds. Pulmonary:      Effort: Pulmonary effort is normal. No respiratory distress. Breath sounds: Normal breath sounds. No wheezing. Feet:      Right foot:      Protective Sensation: 6 sites tested. 6 sites sensed. Skin integrity: Callus present. Left foot:      Protective Sensation: 6 sites tested. 6 sites sensed. Skin integrity: Callus present. Lymphadenopathy:      Cervical: No cervical adenopathy. Skin:     General: Skin is warm. Findings: No rash. Neurological:      Mental Status: He is alert and oriented to person, place, and time. Psychiatric:         Mood and Affect: Mood normal.         Behavior: Behavior normal.         Thought Content: Thought content normal.       Assessment:       Diagnosis Orders   1. Type 2 diabetes mellitus without complication, without long-term current use of insulin ([facility])  POCT glycosylated hemoglobin (Hb A1C)    Lipid Panel    Comprehensive Metabolic Panel, Fasting     DIABETES FOOT EXAM      2. Essential hypertension  amLODIPine-benazepril (LOTREL) 5-10 MG per capsule           Plan:      No follow-ups on file.   Try smaller dose lotrel    Orders Placed This Encounter   Procedures Lipid Panel     Standing Status:   Future     Standing Expiration Date:   11/14/2023     Order Specific Question:   Is Patient Fasting?/# of Hours     Answer:   yes    Comprehensive Metabolic Panel, Fasting     Standing Status:   Future     Standing Expiration Date:   11/14/2023    POCT glycosylated hemoglobin (Hb A1C)    HM DIABETES FOOT EXAM       Orders Placed This Encounter   Medications    amLODIPine-benazepril (LOTREL) 5-10 MG per capsule     Sig: Take 1 capsule by mouth daily     Dispense:  30 capsule     Refill:  3         Patient given educationalmaterials - see patient instructions. Discussed use, benefit, and side effectsof prescribed medications. All patient questions answered. Pt voiced understanding. Reviewed health maintenance. Instructed to continue current medications, diet andexercise. Patient agreed with treatment plan. Follow up as directed.      Electronicallysigned by Agusto Duran MD on 11/14/2022 at 2:42 PM

## 2022-11-18 DIAGNOSIS — E11.9 TYPE 2 DIABETES MELLITUS WITHOUT COMPLICATION, UNSPECIFIED WHETHER LONG TERM INSULIN USE (HCC): ICD-10-CM

## 2022-11-25 LAB
ALBUMIN SERPL-MCNC: NORMAL G/DL
ALP BLD-CCNC: NORMAL U/L
ALT SERPL-CCNC: NORMAL U/L
AST SERPL-CCNC: NORMAL U/L
BILIRUB SERPL-MCNC: NORMAL MG/DL
BUN BLDV-MCNC: NORMAL MG/DL
CALCIUM SERPL-MCNC: NORMAL MG/DL
CHLORIDE BLD-SCNC: NORMAL MMOL/L
CHOLESTEROL, TOTAL: 168 MG/DL
CHOLESTEROL/HDL RATIO: 4.1
CO2: NORMAL
CREAT SERPL-MCNC: NORMAL MG/DL
GLUCOSE FASTING: 137 MG/DL
HDLC SERPL-MCNC: 41 MG/DL (ref 35–70)
LDL CHOLESTEROL CALCULATED: 96 MG/DL (ref 0–160)
NONHDLC SERPL-MCNC: NORMAL MG/DL
POTASSIUM SERPL-SCNC: NORMAL MMOL/L
PROSTATE SPECIFIC ANTIGEN: NORMAL
SODIUM BLD-SCNC: NORMAL MMOL/L
TOTAL PROTEIN: NORMAL
TRIGL SERPL-MCNC: 157 MG/DL
VLDLC SERPL CALC-MCNC: 31 MG/DL

## 2022-11-28 DIAGNOSIS — Z12.5 PROSTATE CANCER SCREENING: ICD-10-CM

## 2022-11-28 DIAGNOSIS — E78.49 OTHER HYPERLIPIDEMIA: ICD-10-CM

## 2022-11-28 DIAGNOSIS — E11.9 TYPE 2 DIABETES MELLITUS WITHOUT COMPLICATION, WITHOUT LONG-TERM CURRENT USE OF INSULIN (HCC): ICD-10-CM

## 2022-11-28 NOTE — RESULT ENCOUNTER NOTE
LDL cholesterol goal is 80. LDL on labs is in the 90s. Sugar slightly elevated. Rest of the lab work is within normal limits  Is he taking the simvastatin every day ?  If he is taking the simvastatin every day at 40 mg then we may need to increase to 80 mg

## 2022-11-29 RX ORDER — SIMVASTATIN 80 MG
80 TABLET ORAL NIGHTLY
Qty: 90 TABLET | Refills: 1 | COMMUNITY
Start: 2022-11-29

## 2022-12-19 DIAGNOSIS — E11.21 TYPE 2 DIABETES WITH NEPHROPATHY (HCC): ICD-10-CM

## 2022-12-19 RX ORDER — LANCETS 30 GAUGE
EACH MISCELLANEOUS
Qty: 100 EACH | Refills: 1 | Status: SHIPPED | OUTPATIENT
Start: 2022-12-19

## 2023-01-11 ENCOUNTER — OFFICE VISIT (OUTPATIENT)
Dept: PRIMARY CARE CLINIC | Age: 73
End: 2023-01-11
Payer: COMMERCIAL

## 2023-01-11 ENCOUNTER — TELEPHONE (OUTPATIENT)
Dept: PRIMARY CARE CLINIC | Age: 73
End: 2023-01-11

## 2023-01-11 VITALS
DIASTOLIC BLOOD PRESSURE: 70 MMHG | WEIGHT: 169.8 LBS | HEART RATE: 101 BPM | SYSTOLIC BLOOD PRESSURE: 160 MMHG | HEIGHT: 65 IN | OXYGEN SATURATION: 95 % | BODY MASS INDEX: 28.29 KG/M2

## 2023-01-11 DIAGNOSIS — E11.21 TYPE 2 DIABETES WITH NEPHROPATHY (HCC): ICD-10-CM

## 2023-01-11 DIAGNOSIS — J06.9 URI, ACUTE: Primary | ICD-10-CM

## 2023-01-11 DIAGNOSIS — I10 ESSENTIAL HYPERTENSION: ICD-10-CM

## 2023-01-11 DIAGNOSIS — J01.10 ACUTE NON-RECURRENT FRONTAL SINUSITIS: ICD-10-CM

## 2023-01-11 PROCEDURE — 3078F DIAST BP <80 MM HG: CPT | Performed by: PHYSICIAN ASSISTANT

## 2023-01-11 PROCEDURE — 99214 OFFICE O/P EST MOD 30 MIN: CPT | Performed by: PHYSICIAN ASSISTANT

## 2023-01-11 PROCEDURE — 1123F ACP DISCUSS/DSCN MKR DOCD: CPT | Performed by: PHYSICIAN ASSISTANT

## 2023-01-11 PROCEDURE — 3077F SYST BP >= 140 MM HG: CPT | Performed by: PHYSICIAN ASSISTANT

## 2023-01-11 RX ORDER — AMOXICILLIN AND CLAVULANATE POTASSIUM 875; 125 MG/1; MG/1
1 TABLET, FILM COATED ORAL 2 TIMES DAILY
Qty: 20 TABLET | Refills: 0 | Status: SHIPPED | OUTPATIENT
Start: 2023-01-11 | End: 2023-01-21

## 2023-01-11 ASSESSMENT — PATIENT HEALTH QUESTIONNAIRE - PHQ9
DEPRESSION UNABLE TO ASSESS: PT REFUSES
1. LITTLE INTEREST OR PLEASURE IN DOING THINGS: 0
SUM OF ALL RESPONSES TO PHQ QUESTIONS 1-9: 0
2. FEELING DOWN, DEPRESSED OR HOPELESS: 0
SUM OF ALL RESPONSES TO PHQ QUESTIONS 1-9: 0
SUM OF ALL RESPONSES TO PHQ9 QUESTIONS 1 & 2: 0

## 2023-01-11 ASSESSMENT — ENCOUNTER SYMPTOMS
CHEST TIGHTNESS: 0
SINUS PRESSURE: 1
EYE REDNESS: 0
NAUSEA: 0
EYE DISCHARGE: 0
DIARRHEA: 1
RHINORRHEA: 1
SORE THROAT: 1
VOMITING: 0
ABDOMINAL PAIN: 0
SHORTNESS OF BREATH: 0
COUGH: 1

## 2023-01-11 NOTE — PROGRESS NOTES
[facility]  [address] [facility] [address]  Dept: [phone]    Reji Waddell is a 68 y.o. male Established patient, who presents today for his medical conditions/complaints as noted below. Chief Complaint   Patient presents with    Sinus Problem     Sinus pressure in the face , Lots of mucus in the am     Cough     On and off for two weeks        HPI:     HPI: Patient notes that he has a cough and sinus pressure over the past 2 weeks. Patient has been taking cold and cough that is for people with high blood pressure which has been helping he also takes tylenol. He states that he has a runny nose and the mucus is why he has been coughing. Has an aneurism repair coming on the .      Reviewed prior notes None  Reviewed previous Labs    LDL Calculated (mg/dL)   Date Value   2022 96   2021 80   2020 61       (goal LDL is <100)   Hemoglobin A1C (%)   Date Value   2022 6.4     BP Readings from Last 3 Encounters:   23 (!) 160/70   22 118/68   10/07/22 135/76          (goal 120/80)  Hemoglobin A1C   Date Value Ref Range Status   2022 6.4 % Final     Past Medical History:   Diagnosis Date    H/O hepatitis       Past Surgical History:   Procedure Laterality Date    CEREBRAL ANGIOGRAM      EYE SURGERY Bilateral 2011    cataract    TONSILLECTOMY         Family History   Problem Relation Age of Onset    Cancer Mother         breast cancer    Diabetes Mother     Other Mother         renal disease    Diabetes Father     Diabetes Sister     Diabetes Brother     Kidney stones Brother     Diabetes Maternal Aunt        Social History     Tobacco Use    Smoking status: Former     Packs/day: 0.10     Years: 1.00     Pack years: 0.10     Types: Cigarettes     Quit date: 1981     Years since quittin.0    Smokeless tobacco: Never   Substance Use Topics    Alcohol use: No     Alcohol/week: 0.0 standard drinks      Current Outpatient Medications   Medication Sig Dispense Refill    amoxicillin-clavulanate (AUGMENTIN) 875-125 MG per tablet Take 1 tablet by mouth 2 times daily for 10 days 20 tablet 0    Lancets MISC USE 1  TO CHECK GLUCOSE ONCE DAILY 100 each 1    simvastatin (ZOCOR) 80 MG tablet Take 1 tablet by mouth nightly 90 tablet 1    metFORMIN (GLUCOPHAGE) 500 MG tablet Take 1 tablet by mouth once daily with breakfast 90 tablet 2    amLODIPine-benazepril (LOTREL) 5-10 MG per capsule Take 1 capsule by mouth daily 30 capsule 3    aspirin 325 MG EC tablet Take 1 tablet by mouth daily 30 tablet 1    blood glucose test strips (TRUE METRIX BLOOD GLUCOSE TEST) strip 1 each by Other route daily Type 2 DM not on insulin 50 each 5    naproxen (NAPROSYN) 500 MG tablet Take 1 tablet by mouth twice daily as needed for pain 180 tablet 0    tamsulosin (FLOMAX) 0.4 MG capsule Take 1 capsule by mouth daily 90 capsule 3    blood glucose monitor strips Test one times a day & as needed for symptoms of irregular blood glucose. Type 2 DM not on insulin 50 strip 3    TRUEplus Lancets 30G MISC USE AS DIRECTED ONCE DAILY 100 each 3    Multiple Vitamin (MULTI VITAMIN DAILY) TABS Take 1 tablet by mouth daily       No current facility-administered medications for this visit.      Allergies   Allergen Reactions    Cialis [Tadalafil]      Leg pain/cramps    Ranitidine      Leg spasms       Health Maintenance   Topic Date Due    DTaP/Tdap/Td vaccine (1 - Tdap) Never done    Shingles vaccine (1 of 2) Never done    Diabetic Alb to Cr ratio (uACR) test  07/12/2022    Diabetic retinal exam  10/25/2022    Annual Wellness Visit (AWV)  02/01/2023    GFR test (Diabetes, CKD 3-4, OR last GFR 15-59)  09/20/2023    Diabetic foot exam  11/14/2023    A1C test (Diabetic or Prediabetic)  11/14/2023    Depression Screen  11/14/2023    Lipids  11/25/2023    Colorectal Cancer Screen  02/07/2029    Flu vaccine  Completed    Pneumococcal 65+ years Vaccine Completed    COVID-19 Vaccine  Completed    AAA screen  Completed    Hepatitis C screen  Completed    Hepatitis A vaccine  Aged Out    Hib vaccine  Aged Out    Meningococcal (ACWY) vaccine  Aged Out       Subjective:      Review of Systems   Constitutional:  Positive for diaphoresis. Negative for chills and fever. HENT:  Positive for congestion, postnasal drip, rhinorrhea, sinus pressure and sore throat. Eyes:  Negative for discharge and redness. Respiratory:  Positive for cough. Negative for chest tightness and shortness of breath. Cardiovascular:  Negative for chest pain and palpitations. Gastrointestinal:  Positive for diarrhea (Takes stool softners everyday. ). Negative for abdominal pain, nausea and vomiting. Neurological:  Negative for dizziness, light-headedness and headaches. Objective:     BP (!) 160/70   Pulse (!) 101   Ht 5' 5\" (1.651 m)   Wt 169 lb 12.8 oz (77 kg)   SpO2 95%   BMI 28.26 kg/m²   Physical Exam  Constitutional:       General: He is not in acute distress. Appearance: Normal appearance. He is not ill-appearing. HENT:      Head: Normocephalic and atraumatic. Right Ear: Tympanic membrane, ear canal and external ear normal.      Left Ear: Tympanic membrane, ear canal and external ear normal.      Nose: Congestion present. Mouth/Throat:      Pharynx: Posterior oropharyngeal erythema present. Cardiovascular:      Rate and Rhythm: Normal rate and regular rhythm. Heart sounds: Normal heart sounds. Pulmonary:      Effort: Pulmonary effort is normal.      Breath sounds: Normal breath sounds. Lymphadenopathy:      Cervical: No cervical adenopathy. Neurological:      Mental Status: He is alert and oriented to person, place, and time. Psychiatric:         Mood and Affect: Mood normal.         Behavior: Behavior normal.         Thought Content:  Thought content normal.       Assessment and Plan:          1. URI, acute  Comments:  Abx not sure which one but maybe a zpak, it has been 2 weeks. Also, tessalon for the cough?  Orders:  -     amoxicillin-clavulanate (AUGMENTIN) 875-125 MG per tablet; Take 1 tablet by mouth 2 times daily for 10 days, Disp-20 tablet, R-0Normal  2. Acute non-recurrent frontal sinusitis  -     amoxicillin-clavulanate (AUGMENTIN) 875-125 MG per tablet; Take 1 tablet by mouth 2 times daily for 10 days, Disp-20 tablet, R-0Normal  3. Essential hypertension  4. Type 2 diabetes with nephropathy (HCC)     The patient also is here with high blood pressure.  Patient care was complicated due to high blood pressure causing inability for decongestions as well as diabetes causing inability for steroids.          Return in about 3 months (around 4/11/2023) for AWV and diabetes with Dr. Ramos .     Patient given educational materials - see patient instructions.  Discussed use, benefit, and side effects of prescribed medications.  All patient questions answered. Pt voiced understanding. Reviewed health maintenance.  Instructed to continue current medications, diet and exercise.  Patient agreed with treatment plan. Follow up as directed.     Electronically signed by FAISAL Shabazz on 1/11/2023 at 1:40 PM

## 2023-01-11 NOTE — TELEPHONE ENCOUNTER
Patient called office states cough, fever, nasal congestion ongoing 11 days. Patient states wife was in hospital for 11 days and is now in rehab and cannot take care of her and himself. Patient states he may have got cold from being in hospital with wife. Patient denies COVID. Patient states he did not take test. Patient states he has tried OTC medication for this. Patient is wanting appt today for this. No openings available today.      [address]    Please advise

## 2023-01-23 RX ORDER — CLOPIDOGREL BISULFATE 75 MG/1
75 TABLET ORAL DAILY
COMMUNITY

## 2023-01-24 ENCOUNTER — ANESTHESIA EVENT (OUTPATIENT)
Dept: INTERVENTIONAL RADIOLOGY/VASCULAR | Age: 73
End: 2023-01-24

## 2023-01-24 ENCOUNTER — HOSPITAL ENCOUNTER (INPATIENT)
Dept: INTERVENTIONAL RADIOLOGY/VASCULAR | Age: 73
LOS: 1 days | Discharge: HOME | DRG: 039 | End: 2023-01-25
Attending: PSYCHIATRY & NEUROLOGY | Admitting: PSYCHIATRY & NEUROLOGY
Payer: COMMERCIAL

## 2023-01-24 ENCOUNTER — ANESTHESIA (OUTPATIENT)
Dept: INTERVENTIONAL RADIOLOGY/VASCULAR | Age: 73
End: 2023-01-24

## 2023-01-24 DIAGNOSIS — I67.1 CEREBRAL ANEURYSM: ICD-10-CM

## 2023-01-24 PROBLEM — I72.9 ANEURYSM (HCC): Status: ACTIVE | Noted: 2023-01-24

## 2023-01-24 LAB
ABO/RH: NORMAL
ACTIVATED CLOTTING TIME: 130 SEC (ref 79–149)
ACTIVATED CLOTTING TIME: 177 SEC (ref 79–149)
ACTIVATED CLOTTING TIME: 211 SEC (ref 79–149)
ACTIVATED CLOTTING TIME: 224 SEC (ref 79–149)
ACTIVATED CLOTTING TIME: 245 SEC (ref 79–149)
ANION GAP: 11 MMOL/L (ref 7–16)
ANTIBODY SCREEN: NEGATIVE
ARM BAND NUMBER: NORMAL
COLLAGEN ADENOSINE-5'-DIPHOSPHATE (ADP) TIME: 91 SEC (ref 67–112)
COLLAGEN EPINEPHRINE TIME: 180 SEC (ref 85–172)
EGFR, POC: >60 ML/MIN/1.73M2
EXPIRATION DATE: NORMAL
GLUCOSE BLD-MCNC: 156 MG/DL (ref 74–100)
GLUCOSE BLD-MCNC: 167 MG/DL (ref 75–110)
PLATELET FUNCTION INTERP: ABNORMAL
POC BUN: 20 MG/DL (ref 8–26)
POC CHLORIDE: 108 MMOL/L (ref 98–107)
POC CREATININE: 0.6 MG/DL (ref 0.51–1.19)
POC HEMATOCRIT: 47 % (ref 41–53)
POC HEMOGLOBIN: 16 G/DL (ref 13.5–17.5)
POC IONIZED CALCIUM: 1.24 MMOL/L (ref 1.15–1.33)
POC LACTIC ACID: 1.1 MMOL/L (ref 0.56–1.39)
POC POTASSIUM: 4.1 MMOL/L (ref 3.5–4.5)
POC SODIUM: 143 MMOL/L (ref 138–146)
POC TCO2: 26 MMOL/L (ref 22–30)

## 2023-01-24 PROCEDURE — 3700000001 HC ADD 15 MINUTES (ANESTHESIA)

## 2023-01-24 PROCEDURE — 75710 ARTERY X-RAYS ARM/LEG: CPT

## 2023-01-24 PROCEDURE — 85014 HEMATOCRIT: CPT

## 2023-01-24 PROCEDURE — 61635 INTRACRAN ANGIOPLSTY W/STENT: CPT

## 2023-01-24 PROCEDURE — C1894 INTRO/SHEATH, NON-LASER: HCPCS

## 2023-01-24 PROCEDURE — 82947 ASSAY GLUCOSE BLOOD QUANT: CPT

## 2023-01-24 PROCEDURE — G0269 OCCLUSIVE DEVICE IN VEIN ART: HCPCS

## 2023-01-24 PROCEDURE — 3700000000 HC ANESTHESIA ATTENDED CARE

## 2023-01-24 PROCEDURE — 7100000000 HC PACU RECOVERY - FIRST 15 MIN

## 2023-01-24 PROCEDURE — C1876 STENT, NON-COA/NON-COV W/DEL: HCPCS

## 2023-01-24 PROCEDURE — 2580000003 HC RX 258: Performed by: ANESTHESIOLOGY

## 2023-01-24 PROCEDURE — 80051 ELECTROLYTE PANEL: CPT

## 2023-01-24 PROCEDURE — 85347 COAGULATION TIME ACTIVATED: CPT

## 2023-01-24 PROCEDURE — B31R1ZZ FLUOROSCOPY OF INTRACRANIAL ARTERIES USING LOW OSMOLAR CONTRAST: ICD-10-PCS | Performed by: PSYCHIATRY & NEUROLOGY

## 2023-01-24 PROCEDURE — 85576 BLOOD PLATELET AGGREGATION: CPT

## 2023-01-24 PROCEDURE — C1769 GUIDE WIRE: HCPCS

## 2023-01-24 PROCEDURE — C1725 CATH, TRANSLUMIN NON-LASER: HCPCS

## 2023-01-24 PROCEDURE — 7100000001 HC PACU RECOVERY - ADDTL 15 MIN

## 2023-01-24 PROCEDURE — 82565 ASSAY OF CREATININE: CPT

## 2023-01-24 PROCEDURE — 86901 BLOOD TYPING SEROLOGIC RH(D): CPT

## 2023-01-24 PROCEDURE — 82330 ASSAY OF CALCIUM: CPT

## 2023-01-24 PROCEDURE — 84520 ASSAY OF UREA NITROGEN: CPT

## 2023-01-24 PROCEDURE — 6360000002 HC RX W HCPCS

## 2023-01-24 PROCEDURE — 93005 ELECTROCARDIOGRAM TRACING: CPT | Performed by: ANESTHESIOLOGY

## 2023-01-24 PROCEDURE — 36415 COLL VENOUS BLD VENIPUNCTURE: CPT

## 2023-01-24 PROCEDURE — 86850 RBC ANTIBODY SCREEN: CPT

## 2023-01-24 PROCEDURE — 03VK3HZ RESTRICTION OF RIGHT INTERNAL CAROTID ARTERY WITH INTRALUMINAL DEVICE, FLOW DIVERTER, PERCUTANEOUS APPROACH: ICD-10-PCS | Performed by: PSYCHIATRY & NEUROLOGY

## 2023-01-24 PROCEDURE — 2580000003 HC RX 258

## 2023-01-24 PROCEDURE — 2500000003 HC RX 250 WO HCPCS

## 2023-01-24 PROCEDURE — 86900 BLOOD TYPING SEROLOGIC ABO: CPT

## 2023-01-24 PROCEDURE — 2709999900 HC NON-CHARGEABLE SUPPLY

## 2023-01-24 PROCEDURE — 83605 ASSAY OF LACTIC ACID: CPT

## 2023-01-24 PROCEDURE — 6360000004 HC RX CONTRAST MEDICATION: Performed by: NURSE ANESTHETIST, CERTIFIED REGISTERED

## 2023-01-24 PROCEDURE — 6370000000 HC RX 637 (ALT 250 FOR IP): Performed by: STUDENT IN AN ORGANIZED HEALTH CARE EDUCATION/TRAINING PROGRAM

## 2023-01-24 PROCEDURE — 2580000003 HC RX 258: Performed by: STUDENT IN AN ORGANIZED HEALTH CARE EDUCATION/TRAINING PROGRAM

## 2023-01-24 PROCEDURE — C1887 CATHETER, GUIDING: HCPCS

## 2023-01-24 PROCEDURE — B31H1ZZ FLUOROSCOPY OF RIGHT UPPER EXTREMITY ARTERIES USING LOW OSMOLAR CONTRAST: ICD-10-PCS | Performed by: PSYCHIATRY & NEUROLOGY

## 2023-01-24 PROCEDURE — 2000000000 HC ICU R&B

## 2023-01-24 PROCEDURE — 2500000003 HC RX 250 WO HCPCS: Performed by: STUDENT IN AN ORGANIZED HEALTH CARE EDUCATION/TRAINING PROGRAM

## 2023-01-24 RX ORDER — SODIUM CHLORIDE 0.9 % (FLUSH) 0.9 %
5-40 SYRINGE (ML) INJECTION PRN
Status: DISCONTINUED | OUTPATIENT
Start: 2023-01-24 | End: 2023-01-25 | Stop reason: HOSPADM

## 2023-01-24 RX ORDER — FENTANYL CITRATE 50 UG/ML
25 INJECTION, SOLUTION INTRAMUSCULAR; INTRAVENOUS EVERY 5 MIN PRN
Status: DISCONTINUED | OUTPATIENT
Start: 2023-01-24 | End: 2023-01-24

## 2023-01-24 RX ORDER — PROPOFOL 10 MG/ML
INJECTION, EMULSION INTRAVENOUS PRN
Status: DISCONTINUED | OUTPATIENT
Start: 2023-01-24 | End: 2023-01-24 | Stop reason: SDUPTHER

## 2023-01-24 RX ORDER — SODIUM CHLORIDE 9 MG/ML
INJECTION, SOLUTION INTRAVENOUS PRN
Status: DISCONTINUED | OUTPATIENT
Start: 2023-01-24 | End: 2023-01-25 | Stop reason: HOSPADM

## 2023-01-24 RX ORDER — IODIXANOL 320 MG/ML
100 INJECTION, SOLUTION INTRAVASCULAR
Status: COMPLETED | OUTPATIENT
Start: 2023-01-24 | End: 2023-01-24

## 2023-01-24 RX ORDER — LABETALOL HYDROCHLORIDE 5 MG/ML
10 INJECTION, SOLUTION INTRAVENOUS
Status: DISCONTINUED | OUTPATIENT
Start: 2023-01-24 | End: 2023-01-25 | Stop reason: HOSPADM

## 2023-01-24 RX ORDER — ACETAMINOPHEN 325 MG/1
650 TABLET ORAL EVERY 6 HOURS PRN
Status: DISCONTINUED | OUTPATIENT
Start: 2023-01-24 | End: 2023-01-25 | Stop reason: HOSPADM

## 2023-01-24 RX ORDER — ONDANSETRON 2 MG/ML
4 INJECTION INTRAMUSCULAR; INTRAVENOUS EVERY 6 HOURS PRN
Status: DISCONTINUED | OUTPATIENT
Start: 2023-01-24 | End: 2023-01-24

## 2023-01-24 RX ORDER — AMLODIPINE BESYLATE AND BENAZEPRIL HYDROCHLORIDE 5; 10 MG/1; MG/1
1 CAPSULE ORAL DAILY
Status: DISCONTINUED | OUTPATIENT
Start: 2023-01-25 | End: 2023-01-24

## 2023-01-24 RX ORDER — HEPARIN SODIUM 1000 [USP'U]/ML
INJECTION, SOLUTION INTRAVENOUS; SUBCUTANEOUS PRN
Status: DISCONTINUED | OUTPATIENT
Start: 2023-01-24 | End: 2023-01-24 | Stop reason: SDUPTHER

## 2023-01-24 RX ORDER — SODIUM CHLORIDE 9 MG/ML
INJECTION, SOLUTION INTRAVENOUS CONTINUOUS PRN
Status: DISCONTINUED | OUTPATIENT
Start: 2023-01-24 | End: 2023-01-24 | Stop reason: SDUPTHER

## 2023-01-24 RX ORDER — LISINOPRIL 10 MG/1
10 TABLET ORAL DAILY
Status: DISCONTINUED | OUTPATIENT
Start: 2023-01-25 | End: 2023-01-25 | Stop reason: HOSPADM

## 2023-01-24 RX ORDER — POLYETHYLENE GLYCOL 3350 17 G/17G
17 POWDER, FOR SOLUTION ORAL DAILY PRN
Status: DISCONTINUED | OUTPATIENT
Start: 2023-01-24 | End: 2023-01-25 | Stop reason: HOSPADM

## 2023-01-24 RX ORDER — ONDANSETRON 2 MG/ML
4 INJECTION INTRAMUSCULAR; INTRAVENOUS
Status: DISCONTINUED | OUTPATIENT
Start: 2023-01-24 | End: 2023-01-24

## 2023-01-24 RX ORDER — SODIUM CHLORIDE 0.9 % (FLUSH) 0.9 %
5-40 SYRINGE (ML) INJECTION EVERY 12 HOURS SCHEDULED
Status: DISCONTINUED | OUTPATIENT
Start: 2023-01-24 | End: 2023-01-25

## 2023-01-24 RX ORDER — ONDANSETRON 4 MG/1
4 TABLET, ORALLY DISINTEGRATING ORAL EVERY 8 HOURS PRN
Status: DISCONTINUED | OUTPATIENT
Start: 2023-01-24 | End: 2023-01-25 | Stop reason: HOSPADM

## 2023-01-24 RX ORDER — ONDANSETRON 2 MG/ML
4 INJECTION INTRAMUSCULAR; INTRAVENOUS EVERY 6 HOURS PRN
Status: DISCONTINUED | OUTPATIENT
Start: 2023-01-24 | End: 2023-01-25 | Stop reason: HOSPADM

## 2023-01-24 RX ORDER — EPHEDRINE SULFATE/0.9% NACL/PF 50 MG/5 ML
SYRINGE (ML) INTRAVENOUS PRN
Status: DISCONTINUED | OUTPATIENT
Start: 2023-01-24 | End: 2023-01-24 | Stop reason: SDUPTHER

## 2023-01-24 RX ORDER — ATORVASTATIN CALCIUM 40 MG/1
40 TABLET, FILM COATED ORAL DAILY
Status: DISCONTINUED | OUTPATIENT
Start: 2023-01-24 | End: 2023-01-25 | Stop reason: HOSPADM

## 2023-01-24 RX ORDER — PROTAMINE SULFATE 10 MG/ML
INJECTION, SOLUTION INTRAVENOUS PRN
Status: DISCONTINUED | OUTPATIENT
Start: 2023-01-24 | End: 2023-01-24 | Stop reason: SDUPTHER

## 2023-01-24 RX ORDER — DIPHENHYDRAMINE HYDROCHLORIDE 50 MG/ML
12.5 INJECTION INTRAMUSCULAR; INTRAVENOUS
Status: ACTIVE | OUTPATIENT
Start: 2023-01-24 | End: 2023-01-25

## 2023-01-24 RX ORDER — SODIUM CHLORIDE 0.9 % (FLUSH) 0.9 %
5-40 SYRINGE (ML) INJECTION EVERY 12 HOURS SCHEDULED
Status: DISCONTINUED | OUTPATIENT
Start: 2023-01-24 | End: 2023-01-25 | Stop reason: HOSPADM

## 2023-01-24 RX ORDER — LIDOCAINE HYDROCHLORIDE 10 MG/ML
INJECTION, SOLUTION EPIDURAL; INFILTRATION; INTRACAUDAL; PERINEURAL PRN
Status: DISCONTINUED | OUTPATIENT
Start: 2023-01-24 | End: 2023-01-24 | Stop reason: SDUPTHER

## 2023-01-24 RX ORDER — HYDRALAZINE HYDROCHLORIDE 20 MG/ML
10 INJECTION INTRAMUSCULAR; INTRAVENOUS
Status: DISCONTINUED | OUTPATIENT
Start: 2023-01-24 | End: 2023-01-25 | Stop reason: HOSPADM

## 2023-01-24 RX ORDER — ONDANSETRON 4 MG/1
4 TABLET, ORALLY DISINTEGRATING ORAL EVERY 8 HOURS PRN
Status: DISCONTINUED | OUTPATIENT
Start: 2023-01-24 | End: 2023-01-24

## 2023-01-24 RX ORDER — FENTANYL CITRATE 50 UG/ML
50 INJECTION, SOLUTION INTRAMUSCULAR; INTRAVENOUS EVERY 5 MIN PRN
Status: DISCONTINUED | OUTPATIENT
Start: 2023-01-24 | End: 2023-01-24

## 2023-01-24 RX ORDER — ROCURONIUM BROMIDE 10 MG/ML
INJECTION, SOLUTION INTRAVENOUS PRN
Status: DISCONTINUED | OUTPATIENT
Start: 2023-01-24 | End: 2023-01-24 | Stop reason: SDUPTHER

## 2023-01-24 RX ORDER — ACETAMINOPHEN 325 MG/1
650 TABLET ORAL EVERY 4 HOURS PRN
Status: DISCONTINUED | OUTPATIENT
Start: 2023-01-24 | End: 2023-01-24

## 2023-01-24 RX ORDER — CLOPIDOGREL BISULFATE 75 MG/1
75 TABLET ORAL DAILY
Status: DISCONTINUED | OUTPATIENT
Start: 2023-01-25 | End: 2023-01-25 | Stop reason: HOSPADM

## 2023-01-24 RX ORDER — FENTANYL CITRATE 50 UG/ML
INJECTION, SOLUTION INTRAMUSCULAR; INTRAVENOUS PRN
Status: DISCONTINUED | OUTPATIENT
Start: 2023-01-24 | End: 2023-01-24 | Stop reason: SDUPTHER

## 2023-01-24 RX ORDER — ASPIRIN 81 MG/1
81 TABLET, CHEWABLE ORAL DAILY
Status: DISCONTINUED | OUTPATIENT
Start: 2023-01-25 | End: 2023-01-25 | Stop reason: HOSPADM

## 2023-01-24 RX ORDER — DEXAMETHASONE SODIUM PHOSPHATE 10 MG/ML
INJECTION, SOLUTION INTRAMUSCULAR; INTRAVENOUS PRN
Status: DISCONTINUED | OUTPATIENT
Start: 2023-01-24 | End: 2023-01-24 | Stop reason: SDUPTHER

## 2023-01-24 RX ORDER — SODIUM CHLORIDE, SODIUM LACTATE, POTASSIUM CHLORIDE, CALCIUM CHLORIDE 600; 310; 30; 20 MG/100ML; MG/100ML; MG/100ML; MG/100ML
INJECTION, SOLUTION INTRAVENOUS CONTINUOUS
Status: DISCONTINUED | OUTPATIENT
Start: 2023-01-24 | End: 2023-01-25

## 2023-01-24 RX ORDER — TAMSULOSIN HYDROCHLORIDE 0.4 MG/1
0.4 CAPSULE ORAL DAILY
Status: DISCONTINUED | OUTPATIENT
Start: 2023-01-24 | End: 2023-01-25 | Stop reason: HOSPADM

## 2023-01-24 RX ORDER — AMLODIPINE BESYLATE 5 MG/1
5 TABLET ORAL DAILY
Status: DISCONTINUED | OUTPATIENT
Start: 2023-01-25 | End: 2023-01-25 | Stop reason: HOSPADM

## 2023-01-24 RX ADMIN — HEPARIN SODIUM 3600 UNITS: 1000 INJECTION INTRAVENOUS; SUBCUTANEOUS at 09:57

## 2023-01-24 RX ADMIN — LIDOCAINE HYDROCHLORIDE 50 MG: 10 INJECTION, SOLUTION EPIDURAL; INFILTRATION; INTRACAUDAL; PERINEURAL at 09:21

## 2023-01-24 RX ADMIN — PROTAMINE SULFATE 20 MG: 10 INJECTION, SOLUTION INTRAVENOUS at 11:40

## 2023-01-24 RX ADMIN — PHENYLEPHRINE HYDROCHLORIDE 150 MCG: 10 INJECTION INTRAVENOUS at 10:59

## 2023-01-24 RX ADMIN — HEPARIN SODIUM 3000 UNITS: 1000 INJECTION INTRAVENOUS; SUBCUTANEOUS at 10:24

## 2023-01-24 RX ADMIN — FENTANYL CITRATE 50 MCG: 50 INJECTION, SOLUTION INTRAMUSCULAR; INTRAVENOUS at 09:21

## 2023-01-24 RX ADMIN — IODIXANOL 54 ML: 320 INJECTION, SOLUTION INTRAVASCULAR at 11:55

## 2023-01-24 RX ADMIN — PHENYLEPHRINE HYDROCHLORIDE 50 MCG: 10 INJECTION INTRAVENOUS at 10:31

## 2023-01-24 RX ADMIN — PHENYLEPHRINE HYDROCHLORIDE 100 MCG: 10 INJECTION INTRAVENOUS at 09:39

## 2023-01-24 RX ADMIN — DEXAMETHASONE SODIUM PHOSPHATE 10 MG: 10 INJECTION INTRAMUSCULAR; INTRAVENOUS at 09:39

## 2023-01-24 RX ADMIN — HEPARIN SODIUM 2000 UNITS: 1000 INJECTION INTRAVENOUS; SUBCUTANEOUS at 10:45

## 2023-01-24 RX ADMIN — SODIUM CHLORIDE, PRESERVATIVE FREE 10 ML: 5 INJECTION INTRAVENOUS at 20:02

## 2023-01-24 RX ADMIN — SODIUM CHLORIDE: 9 INJECTION, SOLUTION INTRAVENOUS at 09:30

## 2023-01-24 RX ADMIN — TAMSULOSIN HYDROCHLORIDE 0.4 MG: 0.4 CAPSULE ORAL at 15:38

## 2023-01-24 RX ADMIN — Medication 5 MG: at 10:33

## 2023-01-24 RX ADMIN — PROPOFOL 150 MG: 10 INJECTION, EMULSION INTRAVENOUS at 09:21

## 2023-01-24 RX ADMIN — Medication 5 MG: at 10:45

## 2023-01-24 RX ADMIN — DESMOPRESSIN ACETATE 40 MG: 0.2 TABLET ORAL at 15:38

## 2023-01-24 RX ADMIN — ROCURONIUM BROMIDE 20 MG: 10 INJECTION, SOLUTION INTRAVENOUS at 10:21

## 2023-01-24 RX ADMIN — Medication 5 MG: at 11:02

## 2023-01-24 RX ADMIN — ROCURONIUM BROMIDE 20 MG: 10 INJECTION, SOLUTION INTRAVENOUS at 11:07

## 2023-01-24 RX ADMIN — Medication 5 MG: at 10:36

## 2023-01-24 RX ADMIN — HEPARIN SODIUM 2000 UNITS: 1000 INJECTION INTRAVENOUS; SUBCUTANEOUS at 11:11

## 2023-01-24 RX ADMIN — PHENYLEPHRINE HYDROCHLORIDE 50 MCG: 10 INJECTION INTRAVENOUS at 10:29

## 2023-01-24 RX ADMIN — PHENYLEPHRINE HYDROCHLORIDE 100 MCG: 10 INJECTION INTRAVENOUS at 09:57

## 2023-01-24 RX ADMIN — Medication 10 MG: at 09:59

## 2023-01-24 RX ADMIN — Medication 5 MG: at 10:48

## 2023-01-24 RX ADMIN — PHENYLEPHRINE HYDROCHLORIDE 100 MCG: 10 INJECTION INTRAVENOUS at 10:50

## 2023-01-24 RX ADMIN — PHENYLEPHRINE HYDROCHLORIDE 100 MCG: 10 INJECTION INTRAVENOUS at 09:45

## 2023-01-24 RX ADMIN — ROCURONIUM BROMIDE 50 MG: 10 INJECTION, SOLUTION INTRAVENOUS at 09:21

## 2023-01-24 RX ADMIN — SODIUM CHLORIDE, POTASSIUM CHLORIDE, SODIUM LACTATE AND CALCIUM CHLORIDE: 600; 310; 30; 20 INJECTION, SOLUTION INTRAVENOUS at 08:59

## 2023-01-24 RX ADMIN — Medication 10 MG: at 09:57

## 2023-01-24 RX ADMIN — SUGAMMADEX 200 MG: 100 INJECTION, SOLUTION INTRAVENOUS at 11:44

## 2023-01-24 RX ADMIN — LABETALOL HYDROCHLORIDE 10 MG: 5 INJECTION, SOLUTION INTRAVENOUS at 15:37

## 2023-01-24 RX ADMIN — FENTANYL CITRATE 50 MCG: 50 INJECTION, SOLUTION INTRAMUSCULAR; INTRAVENOUS at 09:53

## 2023-01-24 RX ADMIN — Medication 10 MG: at 10:57

## 2023-01-24 RX ADMIN — PHENYLEPHRINE HYDROCHLORIDE 25 MCG/MIN: 10 INJECTION INTRAVENOUS at 10:05

## 2023-01-24 RX ADMIN — PHENYLEPHRINE HYDROCHLORIDE 150 MCG: 10 INJECTION INTRAVENOUS at 10:32

## 2023-01-24 ASSESSMENT — PAIN - FUNCTIONAL ASSESSMENT: PAIN_FUNCTIONAL_ASSESSMENT: 0-10

## 2023-01-24 NOTE — SEDATION DOCUMENTATION
Report called to recovery. CHISHOLM/following commands.  Puncture site clean dry and intact with TR band

## 2023-01-24 NOTE — PROGRESS NOTES
[phone] is phone number for son, Hien Cortez who requests call if being discharged tomorrow.  Alternate number is [phone] for daughterMarquis Nurse     Electronically signed by Barbie Iglesias DO on 1/24/2023 at 3:02 PM

## 2023-01-24 NOTE — BRIEF OP NOTE
Brief Postoperative Note                  [facility]    NEUROENDOVASCULAR SERVICE: POST-OP NOTE: 1/24/2023    Pt Name: Kenya Chandra  MRN: [id number]  YOB: 1950  Date of Procedure: 1/24/2023  Primary Care Physician: Willie Webster MD      Pre-Procedural Diagnosis:Right ICA superior hypophyseal aneurysm   Post-Procedural Diagnosis:Same as above       Procedure Performed:Diagnostic Cerebral Angiogram with right ICA superior hypophyseal aneurysm flow diverter embolization    Surgeon:   Vignesh Patel MD    Fellow:  Devin Flynn MD and Cyn Porter MD     [facility]isting [facility]  [facility]    PRE-PROCEDURAL EXAM:  Prestroke baseline mRS MODIFIED TURNER SCORE: 0 - No symptoms at all. Neurological exam performed and unchanged from initial H&P or consult      Anesthesia: General Anesthesia  Complications: none    Intra-Operative EXAM:  Patient sedated with unchanged limited neurological exam    EBL: < less than 50       Cc            Specimens: Were not Obtained  Contrast:     Visipaque 320 low osmolar 54 Cc             Fluoro: 33.7 min    Findings:  Please see dictated Radiology note for further details  Right ICA superior hypophyseal aneurysm wide necked measuring width 2.58 mm x height 2.69 mm   The above aneurysm was treated from right radial access, with Surpass Evolve 4.5 mm x 17 mm flow diverter   Post flow diverter balloon angioplasty with Hyperform balloon 4 x 15 mm x 2   Distal and proximal end well apposition of the flow diverter with no evidence of endo leak              Bob score: class III    POST-PROCEDURAL EXAM :   Stable neurological Exam  Neurological exam performed and unchanged from initial H&P or consult    Closure:  right TR Band 6   F        POST-PROCEDURAL MONITORING : see orders  Disposition: Neuro ICU      Recommendations:  Back to Neuro ICU  Do not bend right leg for 3 hours. Groin checks per protocol. Peripheral pulse checks per protocol.   SBP goal 100-140 mm hg   C/w ASA and Plavix   Follow up with Marly Valladares MD  2 weeks after discharge and Dr. Cas Gonzalez 3 months after discharge.         MD Dia Rasheed MD   Pager [phone]  Stroke, [facility]  [address]  Electronically signed 1/24/2023 at 11:51 AM

## 2023-01-24 NOTE — ANESTHESIA PRE PROCEDURE
Department of Anesthesiology  Preprocedure Note       Name:  Lannette Hatchet   Age:  68 y.o.  :  1950                                          MRN:  [id number]         Date:  2023      Surgeon: * Surgery not found *    Procedure: cerebral aneurysm     Medications prior to admission:   Prior to Admission medications    Medication Sig Start Date End Date Taking? Authorizing Provider   clopidogrel (PLAVIX) 75 MG tablet Take 75 mg by mouth daily   Yes Historical Provider, MD   Lancnancy MISC USE [address]   Kenzie Griffiths MD   simvastatin (ZOCOR) 80 MG tablet Take 1 tablet by mouth nightly 22   Kenzie Griffiths MD   metFORMIN (GLUCOPHAGE) 500 MG tablet Take 1 tablet by mouth once daily with breakfast 22   Kenzie Griffiths MD   amLODIPine-benazepril (LOTREL) 5-10 MG per capsule Take 1 capsule by mouth daily 22   Kenzie Griffiths MD   aspirin 325 MG EC tablet Take 1 tablet by mouth daily 10/7/22 10/7/23  Jigar Gaston MD   blood glucose test strips (TRUE METRIX BLOOD GLUCOSE TEST) strip 1 each by Other route daily Type 2 DM not on insulin 22   Kenzie Griffiths MD   naproxen (NAPROSYN) 500 MG tablet Take 1 tablet by mouth twice daily as needed for pain  Patient not taking: Reported on 2023 6/15/22   Kenzie Griffiths MD   tamsulosin [facility]) 0.4 MG capsule Take 1 capsule by mouth daily 3/7/22   Martha Marquis MD   blood glucose monitor strips Test one times a day & as needed for symptoms of irregular blood glucose.  Type 2 DM not on insulin 21   Kenzie Griffiths MD   TRUEplus Lancets 30G MISC USE AS DIRECTED ONCE DAILY 20   Kenzie Griffiths MD   Multiple Vitamin (MULTI VITAMIN DAILY) TABS Take 1 tablet by mouth daily    Historical Provider, MD       Current medications:    Current Outpatient Medications   Medication Sig Dispense Refill    clopidogrel (PLAVIX) 75 MG tablet Take 75 mg by mouth daily      Lancets MISC USE 1  TO CHECK GLUCOSE ONCE DAILY 100 each 1    simvastatin (ZOCOR) 80 MG tablet Take 1 tablet by mouth nightly 90 tablet 1    metFORMIN (GLUCOPHAGE) 500 MG tablet Take 1 tablet by mouth once daily with breakfast 90 tablet 2    amLODIPine-benazepril (LOTREL) 5-10 MG per capsule Take 1 capsule by mouth daily 30 capsule 3    aspirin 325 MG EC tablet Take 1 tablet by mouth daily 30 tablet 1    blood glucose test strips (TRUE METRIX BLOOD GLUCOSE TEST) strip 1 each by Other route daily Type 2 DM not on insulin 50 each 5    naproxen (NAPROSYN) 500 MG tablet Take 1 tablet by mouth twice daily as needed for pain (Patient not taking: Reported on 1/23/2023) 180 tablet 0    tamsulosin (FLOMAX) 0.4 MG capsule Take 1 capsule by mouth daily 90 capsule 3    blood glucose monitor strips Test one times a day & as needed for symptoms of irregular blood glucose. Type 2 DM not on insulin 50 strip 3    TRUEplus Lancets 30G MISC USE AS DIRECTED ONCE DAILY 100 each 3    Multiple Vitamin (MULTI VITAMIN DAILY) TABS Take 1 tablet by mouth daily       No current facility-administered medications for this encounter. 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    Asymptomatic stenosis of left carotid artery I65.22    Right internal carotid artery aneurysm I67.1       Past Medical History:        Diagnosis Date    Aneurysm ([facility]     rt superior hypophyseal    Diabetes mellitus ([facility] [address] [facility]     H/O hepatitis     Hyperlipidemia     Hypertension     Wellness examination     PCP Dr Ramon Apgar; last visit 22       Past Surgical History:        Procedure Laterality Date    CEREBRAL ANGIOGRAM      EYE SURGERY Bilateral 2011    cataract    TONSILLECTOMY         Social History:    Social History     Tobacco Use    Smoking status: Former     Packs/day: 0.10     Years: 1.00     Pack years: 0.10     Types: Cigarettes     Quit date: 1981     Years since quittin.0    Smokeless tobacco: Never   Substance Use Topics    Alcohol use: No     Alcohol/week: 0.0 standard drinks                                Counseling given: Not Answered      Vital Signs (Current):   Vitals:    23 1047   Weight: 176 lb (79.8 kg)   Height: 5' 6\" (1.676 m)                                              BP Readings from Last 3 Encounters:   23 (!) 160/70   22 118/68   10/07/22 135/76       NPO Status:                                                                                 BMI:   Wt Readings from Last 3 Encounters:   23 176 lb (79.8 kg)   23 169 lb 12.8 oz (77 kg)   22 178 lb 9.6 oz (81 kg)     Body mass index is 28.41 kg/m². CBC: No results found for: WBC, RBC, HGB, HCT, MCV, RDW, PLT    CMP:   Lab Results   Component Value Date/Time    CREATININE 0.66 2022 08:11 AM    LABGLOM >60 2022 08:11 AM    GLUCOSE 169 2018 02:36 PM       POC Tests: No results for input(s): POCGLU, POCNA, POCK, POCCL, POCBUN, POCHEMO, POCHCT in the last 72 hours.     Coags: No results found for: PROTIME, INR, APTT    HCG (If Applicable): No results found for: PREGTESTUR, PREGSERUM, HCG, HCGQUANT     ABGs: No results found for: PHART, PO2ART, FCA7VPJ, UWY9LAO, BEART, Z2HIPKKD     Type & Screen (If Applicable):  No results found for: LABABO, LABRH    Drug/Infectious Status (If Applicable):  No results found for: HIV, HEPCAB    COVID-19 Screening (If Applicable):   Lab Results Component Value Date/Time    COVID19 Comment 01/14/2022 07:59 AM           Anesthesia Evaluation  Patient summary reviewed no history of anesthetic complications:   Airway: Mallampati: II          Dental:          Pulmonary:Negative Pulmonary ROS and normal exam  breath sounds clear to auscultation                             Cardiovascular:    (+) hypertension:,         Rhythm: regular  Rate: normal                    Neuro/Psych:   Negative Neuro/Psych ROS              GI/Hepatic/Renal: Neg GI/Hepatic/Renal ROS            Endo/Other:    (+) DiabetesType II DM, , .                 Abdominal:             Vascular:   + PVD, aortic or cerebral, . ROS comment: Asymptomatic stenosis of left carotid artery  Right internal carotid artery aneurysm    . Other Findings:           Anesthesia Plan      general     ASA 3       Induction: intravenous. arterial line  MIPS: Postoperative opioids intended, Prophylactic antiemetics administered and Postoperative trial extubation. Anesthetic plan and risks discussed with patient. Plan discussed with CRNA.             lvef normal   ekg nsr        Raven Carr MD   1/24/2023

## 2023-01-24 NOTE — ANESTHESIA POSTPROCEDURE EVALUATION
Department of Anesthesiology  Postprocedure Note    Patient: Duane T Bancroft  MRN: [id number]  YOB: 1950  Date of evaluation: 1/24/2023      Procedure Summary     Date: 01/24/23 Room / Location: [facility]    Anesthesia Start: 0908 Anesthesia Stop: 1215    Procedure: IR ANGIOGRAM CAROTID CEREBRAL BILATERAL Diagnosis:       Cerebral aneurysm      Aneurysm (HCC)    Scheduled Providers: EMPERATRIZ Vazquez - CRNA Responsible Provider: Douglas Goel MD    Anesthesia Type: general ASA Status: 3          Anesthesia Type: No value filed.    Valentine Phase I: Valentine Score: 10    Valentine Phase II:        Anesthesia Post Evaluation    Patient location during evaluation: PACU  Patient participation: complete - patient participated  Level of consciousness: awake  Pain score: 1  Airway patency: patent  Nausea & Vomiting: no nausea and no vomiting  Complications: no  Cardiovascular status: blood pressure returned to baseline and hemodynamically stable  Respiratory status: acceptable  Hydration status: euvolemic

## 2023-01-24 NOTE — H&P
Endovascular Neurosurgery History and Physical      Reason for evaluation: Right ICA superior hypophyseal aneurysm     SUBJECTIVE:   History of Chief Complaint:    Jessica Rene is a 72 y/o M with pmh significant for DM, Htn presented to [facility] for headache and was found to have incidental Right superior hypophyseal aneurysm on CTA head. Patient underwent DSA which confirmed the finding. Patient complains of headache about 1-2 times/ month. Patient is a ex-smoker. No family history of intracranial aneurysm. Patient was therefore recommended to have right ICA aneurysm endovascular flow diverter embolization. Patient has been taking ASA and Plavix for last 7 days as instructed. Patient denied any headache, dizziness or focal weakness or numbness. Allergies  is allergic to cialis [tadalafil] and ranitidine. Medications  Prior to Admission medications    Medication Sig Start Date End Date Taking?  Authorizing Provider   clopidogrel (PLAVIX) 75 MG tablet Take 75 mg by mouth daily   Yes Historical Provider, MD   Lancets [facility] USE 1   [address] 12/19/22   Bruna Lawton MD   simvastatin (ZOCOR) 80 MG tablet Take 1 tablet by mouth nightly 11/29/22   Bruna Lawton MD   metFORMIN (GLUCOPHAGE) 500 MG tablet Take 1 tablet by mouth once daily with breakfast 11/18/22   Bruna Lawton MD   amLODIPine-benazepril (LOTREL) 5-10 MG per capsule Take 1 capsule by mouth daily 11/14/22   Bruna Lawton MD   aspirin 325 MG EC tablet Take 1 tablet by mouth daily 10/7/22 10/7/23  Rebecca Etienne MD   blood glucose test strips (TRUE METRIX BLOOD GLUCOSE TEST) strip 1 each by Other route daily Type 2 DM not on insulin 7/11/22   Bruna Lawton MD   naproxen (NAPROSYN) 500 MG tablet Take 1 tablet by mouth twice daily as needed for pain  Patient not taking: Reported on 1/23/2023 6/15/22   Bruna Lawton MD   tamsulosin [facility]) 0.4 MG capsule Take 1 capsule by mouth daily 3/7/22   Donny Frausto MD blood glucose monitor strips Test one times a day & as needed for symptoms of irregular blood glucose. Type 2 DM not on insulin 7/19/21   Nilam Garnica MD   TRUEplus Lancets 30G MISC USE AS DIRECTED ONCE DAILY 6/8/20   Nilam Garnica MD   Multiple Vitamin (MULTI VITAMIN DAILY) TABS Take 1 tablet by mouth daily    Historical Provider, MD    Scheduled Meds:  Continuous Infusions:  PRN Meds:.  Past Medical History   has a past medical history of Aneurysm ([facility] [address] Diabetes mellitus ([facility] [address] H/O hepatitis, Hyperlipidemia, Hypertension, Sinus infection, and Wellness examination. Past Surgical History   has a past surgical history that includes eye surgery (Bilateral, 01/01/2011); Tonsillectomy (1976); and Cerebral angiogram.  Social History   reports that he quit smoking about 42 years ago. His smoking use included cigarettes. He has a 0.10 pack-year smoking history. He has never used smokeless tobacco.   reports no history of alcohol use. reports no history of drug use. Family History  family history includes Cancer in his mother; Diabetes in his brother, father, maternal aunt, mother, and sister; Kidney stones in his brother; Other in his mother. Review of Systems:  CONSTITUTIONAL:  negative for fevers, chills, fatigue and malaise    EYES:  negative for double vision, blurred vision and photophobia     HEENT:  negative for tinnitus, epistaxis and sore throat    RESPIRATORY:  negative for cough, shortness of breath, wheezing    CARDIOVASCULAR:  negative for chest pain, palpitations, syncope, edema    GASTROINTESTINAL:  negative for nausea, vomiting    GENITOURINARY:  negative for incontinence    MUSCULOSKELETAL:  negative for neck or back pain    NEUROLOGICAL:  Negative for weakness and tingling  negative for headaches and dizziness    PSYCHIATRIC:  negative for anxiety      Review of systems otherwise negative.       OBJECTIVE:     Vitals:    01/24/23 0805   BP: (!) 152/73   Pulse: 84   Resp: 16   Temp: 97.2 °F (36.2 °C)   SpO2: 96%        General:  Gen: normal habitus, NAD  HEENT: NCAT, mucosa moist  Cvs: RRR, S1 S2 normal  Resp: symmetric unlabored breathing  Abd: s/nd/nt  Ext: no edema  Skin: no lesions seen, warm and dry    Neuro:  Gen: awake and alert, oriented x3. Lang/speech: no aphasia or dysarthria. Follows commands. CN: PERRL, EOMI, VFF, V1-3 intact, face symmetric, hearing intact, shoulder shrug symmetric, tongue midline  Motor: grossly 5/5 UE and LE b/l  Sense: LT intact in all 4 ext. Coord: FTN and HTS intact b/l  DTR: deferred  Gait: narrow base gait    NIH Stroke Scale:   1a  Level of consciousness: 0 - alert; keenly responsive   1b. LOC questions:  0 - answers both questions correctly   1c. LOC commands: 0 - performs both tasks correctly   2. Best Gaze: 0 - normal   3. Visual: 0 - no visual loss   4. Facial Palsy: 0 - normal symmetric movement   5a. Motor left arm: 0 - no drift, limb holds 90 (or 45) degrees for full 10 seconds   5b. Motor right arm: 0 - no drift, limb holds 90 (or 45) degrees for full 10 seconds   6a. Motor left le - no drift; leg holds 30 degree position for full 5 seconds   6b  Motor right le - no drift; leg holds 30 degree position for full 5 seconds   7. Limb Ataxia: 0 - absent   8. Sensory: 0 - normal; no sensory loss   9. Best Language:  0 - no aphasia, normal   10. Dysarthria: 0 - normal   11. Extinction and Inattention: 0 - no abnormality         Total:   0     MRS: 0      LABS:   Reviewed. Lab Results   Component Value Date    CREATININE 0.66 2022      Lab Results   Component Value Date/Time    COVID19 Comment 2022 07:59 AM       RADIOLOGY:   Images were personally reviewed includin. Right wide-necked para-ophthalmic? ICA? aneurysm, inferior medially oriented, width 2.38 mm x length 2.43mm x1.69mm height   2. Left internal carotid artery plaque, less than 10% stenosis, hemodynamically insignificant   3. Functional fetal?right? PCA   4.   Aplastic right PCA P1 segment       ASSESSMENT:   70 y/o M with pmh significant for DM, Htn presented to [facility] for headache and was found to have incidental Right superior hypophyseal aneurysm on CTA head. Patient underwent DSA which confirmed the finding. Patient complains of headache about 1-2 times/ month. Patient is a ex-smoker. No family history of intracranial aneurysm. Patient was therefore recommended to have right ICA aneurysm endovascular flow diverter embolization. Neuro exam was stable this am.   Risks and benefits discussed, risks include but are not limited to bruising, stroke, subarachnoid hemorrhage, death, retroperitoneal hematoma, pseudoaneurysm, lower extremity/renal/peripheral vascular compromise, informed consent obtained. PLAN:   --Endovascular right ICA superior hypophyseal aneurysm flow diverter embolization   --Procedure with GA   --Right radial access   --A line for BP monitoring   --ICU admission post procedure     Case discussed with Dr. Cricket Colvin attending.     Nano Apple MD    Stroke, [facility]  [address]  Electronically signed 1/24/2023 at 8:30 AM

## 2023-01-24 NOTE — CARE COORDINATION
Case Management Assessment  Initial Evaluation    Date/Time of Evaluation: 1/24/2023 5:47 PM  Assessment Completed by: Anjum Mitchell RN    If patient is discharged prior to next notation, then this note serves as note for discharge by case management. Patient Name: Lannette Hatchet                   YOB: 1950  Diagnosis: Aneurysm ([address] [I72.9]  Cerebral aneurysm [I67.1]                   Date / Time: 1/24/2023 12:59 PM    Patient Admission Status: Inpatient   Readmission Risk (Low < 19, Mod (19-27), High > 27): Readmission Risk Score: 6.9    Current PCP: Kenzie Griffiths MD  PCP verified by CM? Chart Reviewed: Yes      History Provided by: Patient  Patient Orientation:      Patient Cognition: Alert    Hospitalization in the last 30 days (Readmission):  No    If yes, Readmission Assessment in CM Navigator will be completed. Advance Directives:      Code Status: Full Code   Patient's Primary Decision Maker is: Legal Next of Kin    Primary Decision MakerMark Matt - [phone]    Discharge Planning:    Patient lives with: Spouse/Significant Other Type of Home: House  Primary Care Giver: Self  Patient Support Systems include: Spouse/Significant Other, Children, Family Members   Current Financial resources: Medicare  Current community resources:    Current services prior to admission: None            Current DME:              Type of Home Care services:  None    ADLS  Prior functional level: Independent in ADLs/IADLs  Current functional level: Independent in ADLs/IADLs    PT AM-PAC:   /24  OT AM-PAC:   /24    Family can provide assistance at DC: Yes  Would you like Case Management to discuss the discharge plan with any other family members/significant others, and if so, who?     Plans to Return to Present Housing: Yes  Other Identified Issues/Barriers to RETURNING to current housing:   Potential Assistance needed at discharge: N/A            Potential DME:    Patient expects to discharge to: [facility] for transportation at discharge:      Financial    Payor: [address] / Plan: [address] / Product Type: *No Product type* /     Does insurance require precert for SNF: Yes    Potential assistance Purchasing Medications: No  Meds-to-Beds request:        Shad Trejo [address] [address]  Phone: [phone] Fax: [phone]      Notes:    Factors facilitating achievement of predicted outcomes: Family support, Cooperative, and Pleasant    Barriers to discharge: Medical complications    Additional Case Management Notes: Patient lives at home with his wife. She has ongoing medical needs. He has family that lives across the street if help is needed. He plans to return home. Family can provide transportation. The Plan for Transition of Care is related to the following treatment goals of Aneurysm ([address] [I72.9]  Cerebral aneurysm [D00.5]    IF APPLICABLE: The Patient and/or patient representative Duane and his family were provided with a choice of provider and agrees with the discharge plan. Freedom of choice list with basic dialogue that supports the patient's individualized plan of care/goals and shares the quality data associated with the providers was provided to: Patient   Patient Representative Name:       The Patient and/or Patient Representative Agree with the Discharge Plan?  Yes    Gustavo Gross RN  Case Management Department  Ph:   Fax:

## 2023-01-24 NOTE — SEDATION DOCUMENTATION
Pt transferred from neuro IR to Recovery with CRNAs and PUJA. VSS. Assessment unchanged. Next check at 1215    Post Procedure Transfer from IR Table  [x] Tubes and Lines intact     Post Procedure Neuro-Checks/NIHSS/Vitals  [x] Completed post procedure  [x] Completed bedside handoff  [x] Frequency ordered  [x] Verbal communication of frequency      Post Procedure Puncture Site Checks  [x] Completed post procedure  [x] Completed bedside handoff  [x] Frequency ordered  [x] Verbal communication of frequency    Post Procedure Pulse  Checks  [x] Completed post procedure  [x] Completed bedside handoff  [x] Frequency ordered  [x] Verbal communication of frequency    Order Set  [x] Post Neuro-Endo Procedure  [] Stroke  [] t-PA     B/P control  [x] Verbal Communication   [x] Order in Care Path    Medication Review  [x] Given during procedure  [x] Current drips/meds/fluids    [x] Physician Notified of All changes in Assessment    Family  [x] Location Post Procedure; Dr Morgan Pringle spoke with family then escorted to recovery waiting area.

## 2023-01-24 NOTE — H&P
Neuro ICU History & Physical    Patient Name: Guy Bowser  Patient : 1950  Room/Bed: 0127/0127-01  Code Status: Full Code  Allergies: Allergies   Allergen Reactions    Cialis [Tadalafil]      Leg pain/cramps    Ranitidine      Leg spasms       CHIEF COMPLAINT     Elective Procedure, HA x 1-2 months     HPI    History Obtained From: EHR, patient    The patient is a 68 y.o. male with past medical history of hyperlipidemia, hypertension, diabetes who presents for an elective diagnostic cerebral angiogram with embolization of aneurysm. Patient presented to his PCPs office in 2022 for complaints of posterior headache with associated dizziness and lightheadedness which was exacerbated by standing. Patient had CTA head and neck performed which demonstrated 3 mm aneurysm in his paraophthalmic right ICA. Patient then underwent diagnostic angiogram in 2020 which demonstrated the right wide necked ICA ICA aneurysm. Patient presented today for embolization of aneurysm and was admitted to the neuro ICU postoperatively for blood pressure management.     On examination patient has no complaints and is resting comfortably    Admitted to ICU From: Neuro endovascular lab  Reason for ICU Admission: Postembolization of right-sided paraophthalmic ICA aneurysm       PATIENT HISTORY   Past Medical History:        Diagnosis Date    Aneurysm ([facility] [address]     rt superior hypophyseal    Diabetes mellitus ([address]     H/O hepatitis     Hyperlipidemia     Hypertension     Sinus infection     Wellness examination     PCP Dr Nidia Toro; last visit 22       Past Surgical History:        Procedure Laterality Date    CEREBRAL ANGIOGRAM      CEREBRAL ANGIOGRAM Bilateral 2023    CAROTID CEREBRAL BILATERAL    EYE SURGERY Bilateral 2011    cataract    TONSILLECTOMY  1976       Social History:   Social History     Socioeconomic History    Marital status: Unknown     Spouse name: Not on file    Number of children: Not on file    Years of education: Not on file    Highest education level: Not on file   Occupational History    Not on file   Tobacco Use    Smoking status: Former     Packs/day: 0.10     Years: 1.00     Pack years: 0.10     Types: Cigarettes     Quit date: 1981     Years since quittin.0    Smokeless tobacco: Never   Substance and Sexual Activity    Alcohol use: No     Alcohol/week: 0.0 standard drinks    Drug use: No    Sexual activity: Not on file   Other Topics Concern    Not on file   Social History Narrative    Not on file     Social Determinants of Health     Financial Resource Strain: Not on file   Food Insecurity: Not on file   Transportation Needs: Not on file   Physical Activity: Not on file   Stress: Not on file   Social Connections: Not on file   Intimate Partner Violence: Not on file   Housing Stability: Not on file       Family History:       Problem Relation Age of Onset    Cancer Mother         breast cancer    Diabetes Mother     Other Mother         renal disease    Diabetes Father     Diabetes Sister     Diabetes Brother     Kidney stones Brother     Diabetes Maternal Aunt        Allergies:    Cialis [tadalafil] and Ranitidine    Medications Prior to Admission:    Medications Prior to Admission: clopidogrel (PLAVIX) 75 MG tablet, Take 75 mg by mouth daily  Lancets MISC, USE 1  TO CHECK GLUCOSE ONCE DAILY  simvastatin (ZOCOR) 80 MG tablet, Take 1 tablet by mouth nightly  metFORMIN (GLUCOPHAGE) 500 MG tablet, Take 1 tablet by mouth once daily with breakfast  amLODIPine-benazepril (LOTREL) 5-10 MG per capsule, Take 1 capsule by mouth daily  aspirin 325 MG EC tablet, Take 1 tablet by mouth daily  blood glucose test strips (TRUE METRIX BLOOD GLUCOSE TEST) strip, 1 each by Other route daily Type 2 DM not on insulin  naproxen (NAPROSYN) 500 MG tablet, Take 1 tablet by mouth twice daily as needed for pain (Patient not taking: Reported on 2023)  tamsulosin (FLOMAX) 0.4 MG capsule, Take 1 capsule by mouth daily  blood glucose monitor strips, Test one times a day & as needed for symptoms of irregular blood glucose.  Type 2 DM not on insulin  TRUEplus Lancets 30G MISC, USE AS DIRECTED ONCE DAILY  Multiple Vitamin (MULTI VITAMIN DAILY) TABS, Take 1 tablet by mouth daily    Current Medications:  Current Facility-Administered Medications: lactated ringers IV soln infusion, , IntraVENous, Continuous  sodium chloride flush 0.9 % injection 5-40 mL, 5-40 mL, IntraVENous, 2 times per day  sodium chloride flush 0.9 % injection 5-40 mL, 5-40 mL, IntraVENous, PRN  0.9 % sodium chloride infusion, , IntraVENous, PRN  fentaNYL (SUBLIMAZE) injection 25 mcg, 25 mcg, IntraVENous, Q5 Min PRN  fentaNYL (SUBLIMAZE) injection 50 mcg, 50 mcg, IntraVENous, Q5 Min PRN  ondansetron (ZOFRAN) injection 4 mg, 4 mg, IntraVENous, Once PRN  diphenhydrAMINE (BENADRYL) injection 12.5 mg, 12.5 mg, IntraVENous, Once PRN  sodium chloride flush 0.9 % injection 5-40 mL, 5-40 mL, IntraVENous, 2 times per day  sodium chloride flush 0.9 % injection 5-40 mL, 5-40 mL, IntraVENous, PRN  0.9 % sodium chloride infusion, , IntraVENous, PRN  acetaminophen (TYLENOL) tablet 650 mg, 650 mg, Oral, Q4H PRN  ondansetron (ZOFRAN-ODT) disintegrating tablet 4 mg, 4 mg, Oral, Q8H PRN **OR** ondansetron (ZOFRAN) injection 4 mg, 4 mg, IntraVENous, Q6H PRN    REVIEW OF SYSTEMS     CONSTITUTIONAL: negative for fatigue   EYES: negative for double vision, blurry vision, and photophobia    HEENT: negative for tinnitus and sore throat   RESPIRATORY: negative for cough, shortness of breath   CARDIOVASCULAR: negative for chest pain, palpitations, or syncope   GASTROINTESTINAL: negative for abdominal pain, nausea, vomiting   GENITOURINARY: negative for incontinence or retention    MUSCULOSKELETAL: negative for neck or back pain, negative for extremity pain   NEUROLOGICAL: Negative for seizures, headaches, weakness, numbness, confusion, aphasia, dysarthria PSYCHIATRIC: negative for agitation, hallucination, SI/HI   SKIN Negative for spontaneous contusions, rashes, or lesions      PHYSICAL EXAM:     /68   Pulse 94   Temp 97.2 °F (36.2 °C)   Resp 16   Ht 5' 6\" (1.676 m)   Wt 176 lb (79.8 kg)   SpO2 93%   BMI 28.41 kg/m²     PHYSICAL EXAM:  CONSTITUTIONAL:  Well developed, well nourished, alert and oriented x 3, in no acute distress. GCS 15. Nontoxic. No dysarthria. No aphasia. HEAD:  normocephalic, atraumatic    EYES:  PERRLA, EOMI. Visual acuity and peripheral vision intact b/l   ENT:  moist mucous membranes   NECK:  supple, symmetric   EXTREMITIES Right radial catheterization site without bleeding or hematoma   LUNGS:  Equal air entry bilaterally   CARDIOVASCULAR:  normal s1 / s2, RRR, distal pulses intact   ABDOMEN:  Soft, no rigidity   NEUROLOGIC:  Mental Status:  A & O x3,awake             Cranial Nerves:    II: Visual acuity:  normal  II: Visual fields:  normal  III: Pupils:  equal, round, reactive to light  III,IV,VI: Extra Ocular Movements: intact  V: Facial sensation:  intact  VII: Facial strength: intact  VIII: Hearing:  intact  IX: Palate:  intact  XI: Shoulder shrug:  intact  XII: Tongue movement:  normal    Motor Exam:    Drift:  absent  Tone:  normal    MOTOR:  RUE: 5/5  LUE: 5/5  RLE: 5/5  LLE: 5/5    Sensory:    Touch:    Right Upper Extremity:  normal  Left Upper Extremity:  normal  Right Lower Extremity:  normal  Left Lower Extremity:  normal       NIH Stroke Scale Total (if not done complete detailed one below):    1a.  Level of consciousness:  0 - alert; keenly responsive  1b. Level of consciousness questions:  0 - answers both questions correctly  1c. Level of consciousness questions:  0 - performs both tasks correctly  2. Best Gaze:  0 - normal  3. Visual:  0 - no visual loss  4. Facial Palsy:  0 - normal symmetric movement  5a. Motor left arm:  0 - no drift, limb holds 90 (or 45) degrees for full 10 seconds  5b.   Motor right arm:  0 - no drift, limb holds 90 (or 45) degrees for full 10 seconds  6a. Motor left le - no drift; leg holds 30 degree position for full 5 seconds  6b. Motor right le - no drift; leg holds 30 degree position for full 5 seconds  7. Limb Ataxia:  0 - absent  8. Sensory:  0 - normal; no sensory loss  9. Best Language:  0 - no aphasia, normal  10. Dysarthria:  0 - normal  11. Extinction and Inattention:  0 - no abnormality    LABS AND IMAGING:     RECENT LABS:  CBC with Differential:  No results found for: WBC, RBC, HGB, HCT, PLT, MCV, MCH, MCHC, RDW, NRBC, SEGSPCT, BANDSPCT, BLASTSPCT, METASPCT, LYMPHOPCT, PROMYELOPCT, MONOPCT, MYELOPCT, EOSPCT, BASOPCT, MONOSABS, LYMPHSABS, EOSABS, BASOSABS, DIFFTYPE  BMP:    Lab Results   Component Value Date/Time    CREATININE 0.60 2023 08:37 AM    LABGLOM >60 2022 08:11 AM    GLUCOSE 169 2018 02:36 PM       RADIOLOGY:  IR ANGIOGRAM CAROTID CEREBRAL BILATERAL    (Results Pending)             Labs and Images reviewed with:    [] Mohammed S. Manuel Olszewski, MD    [] Iván Mccauley MD  [] Raisa Paulino MD  --[x] there are no new interval images to review. ASSESSMENT AND PLAN:         ASSESSMENT:     This is a 68 y.o. male who presents to the neuro ICU from the endovascular lab for postoperative management of right paraophthalmic ICA aneurysm embolization. Currently he is asymptomatic. Patient care will be discussed with attending, will reevaluate patient along with attending. PLAN/MEDICAL DECISION MAKING:    NEUROLOGIC:  - Imaging: DSA with right-sided paraophthalmic aneurysm  - Goal -140  -Close monitoring of neurologic status  -Asa/plavix    CARDIOVASCULAR:  - Goal -140  -Will restart home amlodipine-benazepril  -PRN labetalol, hydralazine    PULMONARY:  - RA    RENAL/FLUID/ELECTROLYTE:  - BUN 20/ Creatinine 0.60  - IVF:  cc/hr    GI/NUTRITION:  NUTRITION:  ADULT DIET; Regular  - Bowel regimen:  MoM, Zofran,   - GI prophylaxis: not indicated    ID:  -Monitor for fevers  - Antimicrobials:  not indicated   -daily cbc    HEME:   - hgb 16  - daily cbc    ENDOCRINE:  -hx diabetes not on insulin  -most recent a1c 6.4  - Continue to monitor blood glucose, goal <180  - glucose controlled - most recent BGL is 167    OTHER:  - PT/OT/ST   - Code Status: Full Code    PROPHYLAXIS:  Stress ulcer: not indicated    DVT PROPHYLAXIS:  - SCD sleeves - Thigh High         Krystle Srinivasan,   Neuro Critical Care Service   Pager [phone]  1/24/2023     1:19 PM

## 2023-01-24 NOTE — SEDATION DOCUMENTATION
Pt arrives to neuro IR alert and oriented. NIH 0. Pt intubated per anesthesia and left radial art line placed. Brown catheter inserted with clear yellow urine return. Pulses marked and palpable. Poor venous access, anesthesia placed PIV in rt foot. Continue to closely monitor.

## 2023-01-25 VITALS
OXYGEN SATURATION: 92 % | TEMPERATURE: 98 F | DIASTOLIC BLOOD PRESSURE: 60 MMHG | BODY MASS INDEX: 28.28 KG/M2 | HEIGHT: 66 IN | HEART RATE: 74 BPM | RESPIRATION RATE: 15 BRPM | WEIGHT: 176 LBS | SYSTOLIC BLOOD PRESSURE: 148 MMHG

## 2023-01-25 LAB
ANION GAP SERPL CALCULATED.3IONS-SCNC: 9 MMOL/L (ref 9–17)
BUN BLDV-MCNC: 10 MG/DL (ref 8–23)
CALCIUM SERPL-MCNC: 8 MG/DL (ref 8.6–10.4)
CHLORIDE BLD-SCNC: 106 MMOL/L (ref 98–107)
CO2: 23 MMOL/L (ref 20–31)
CREAT SERPL-MCNC: 0.35 MG/DL (ref 0.7–1.2)
EKG ATRIAL RATE: 75 BPM
EKG P AXIS: 6 DEGREES
EKG P-R INTERVAL: 154 MS
EKG Q-T INTERVAL: 392 MS
EKG QRS DURATION: 102 MS
EKG QTC CALCULATION (BAZETT): 437 MS
EKG R AXIS: -13 DEGREES
EKG T AXIS: 4 DEGREES
EKG VENTRICULAR RATE: 75 BPM
GFR SERPL CREATININE-BSD FRML MDRD: >60 ML/MIN/1.73M2
GLUCOSE BLD-MCNC: 155 MG/DL (ref 70–99)
GLUCOSE BLD-MCNC: 183 MG/DL (ref 75–110)
HCT VFR BLD CALC: 39.3 % (ref 40.7–50.3)
HEMOGLOBIN: 12.9 G/DL (ref 13–17)
MCH RBC QN AUTO: 29.7 PG (ref 25.2–33.5)
MCHC RBC AUTO-ENTMCNC: 32.8 G/DL (ref 28.4–34.8)
MCV RBC AUTO: 90.3 FL (ref 82.6–102.9)
NRBC AUTOMATED: 0 PER 100 WBC
PDW BLD-RTO: 12.1 % (ref 11.8–14.4)
PLATELET # BLD: 326 K/UL (ref 138–453)
PMV BLD AUTO: 8.6 FL (ref 8.1–13.5)
POTASSIUM SERPL-SCNC: 4.2 MMOL/L (ref 3.7–5.3)
RBC # BLD: 4.35 M/UL (ref 4.21–5.77)
SODIUM BLD-SCNC: 138 MMOL/L (ref 135–144)
WBC # BLD: 13.3 K/UL (ref 3.5–11.3)

## 2023-01-25 PROCEDURE — 6370000000 HC RX 637 (ALT 250 FOR IP): Performed by: STUDENT IN AN ORGANIZED HEALTH CARE EDUCATION/TRAINING PROGRAM

## 2023-01-25 PROCEDURE — 2500000003 HC RX 250 WO HCPCS: Performed by: STUDENT IN AN ORGANIZED HEALTH CARE EDUCATION/TRAINING PROGRAM

## 2023-01-25 PROCEDURE — 82947 ASSAY GLUCOSE BLOOD QUANT: CPT

## 2023-01-25 PROCEDURE — 97161 PT EVAL LOW COMPLEX 20 MIN: CPT

## 2023-01-25 PROCEDURE — 97165 OT EVAL LOW COMPLEX 30 MIN: CPT

## 2023-01-25 PROCEDURE — 2580000003 HC RX 258: Performed by: STUDENT IN AN ORGANIZED HEALTH CARE EDUCATION/TRAINING PROGRAM

## 2023-01-25 PROCEDURE — 97535 SELF CARE MNGMENT TRAINING: CPT

## 2023-01-25 PROCEDURE — 97530 THERAPEUTIC ACTIVITIES: CPT

## 2023-01-25 PROCEDURE — 85027 COMPLETE CBC AUTOMATED: CPT

## 2023-01-25 PROCEDURE — 93010 ELECTROCARDIOGRAM REPORT: CPT | Performed by: INTERNAL MEDICINE

## 2023-01-25 PROCEDURE — 99223 1ST HOSP IP/OBS HIGH 75: CPT | Performed by: PSYCHIATRY & NEUROLOGY

## 2023-01-25 PROCEDURE — 80048 BASIC METABOLIC PNL TOTAL CA: CPT

## 2023-01-25 RX ORDER — ASPIRIN 81 MG/1
81 TABLET, CHEWABLE ORAL DAILY
Qty: 30 TABLET | Refills: 3 | Status: SHIPPED | OUTPATIENT
Start: 2023-01-26

## 2023-01-25 RX ADMIN — AMLODIPINE BESYLATE 5 MG: 5 TABLET ORAL at 08:20

## 2023-01-25 RX ADMIN — SODIUM CHLORIDE, PRESERVATIVE FREE 10 ML: 5 INJECTION INTRAVENOUS at 09:12

## 2023-01-25 RX ADMIN — LISINOPRIL 10 MG: 10 TABLET ORAL at 08:20

## 2023-01-25 RX ADMIN — DESMOPRESSIN ACETATE 40 MG: 0.2 TABLET ORAL at 08:21

## 2023-01-25 RX ADMIN — TAMSULOSIN HYDROCHLORIDE 0.4 MG: 0.4 CAPSULE ORAL at 08:19

## 2023-01-25 RX ADMIN — LABETALOL HYDROCHLORIDE 10 MG: 5 INJECTION, SOLUTION INTRAVENOUS at 05:51

## 2023-01-25 RX ADMIN — ASPIRIN 81 MG: 81 TABLET, CHEWABLE ORAL at 08:19

## 2023-01-25 RX ADMIN — CLOPIDOGREL 75 MG: 75 TABLET, FILM COATED ORAL at 08:19

## 2023-01-25 NOTE — PROGRESS NOTES
CLINICAL PHARMACY NOTE: MEDS TO BEDS    Total # of Prescriptions Filled: 1   The following medications were delivered to the patient:  Asa chew     Additional Documentation:

## 2023-01-25 NOTE — DISCHARGE INSTR - ACTIVITY
You were admitted to the hospital after elective treatment of your cerebral aneurysm on 1/24/23. Continue Aspirin 81mg and Plavix 75mg daily. No lifting greater than 10 pounds for 14 days post procedure. Take your medications as prescribed. Follow up in the Neuro Endovascular clinic in 2 weeks with Dr. Jatinder Zhao and 3 months with Dr. Deb Mathur.

## 2023-01-25 NOTE — PROGRESS NOTES
Endovascular Neurosurgery Progress Note    SUBJECTIVE:   No acute events overnight, patient was complaining of mild headache on left side. Review of Systems:  CONSTITUTIONAL:  negative for fevers, chills, fatigue and malaise    EYES:  negative for double vision, blurred vision and photophobia     HEENT:  negative for tinnitus, epistaxis and sore throat    RESPIRATORY:  negative for cough, shortness of breath, wheezing    CARDIOVASCULAR:  negative for chest pain, palpitations, syncope, edema    GASTROINTESTINAL:  negative for nausea, vomiting    GENITOURINARY:  negative for incontinence    MUSCULOSKELETAL:  negative for neck or back pain    NEUROLOGICAL:  Negative for weakness and tingling  negative for headaches and dizziness    PSYCHIATRIC:  negative for anxiety      Review of systems otherwise negative. OBJECTIVE:     Vitals:    01/25/23 0820   BP: (!) 148/60   Pulse:    Resp:    Temp:    SpO2:         General:  Gen: normal habitus, NAD  HEENT: NCAT, mucosa moist  Cvs: RRR, S1 S2 normal  Resp: symmetric unlabored breathing  Abd: s/nd/nt  Ext: no edema  Skin: no lesions seen, warm and dry    Neuro:  Gen: awake and alert, oriented x3. Lang/speech: no aphasia or dysarthria. Follows commands. CN: PERRL, EOMI, VFF, V1-3 intact, face symmetric, hearing intact, shoulder shrug symmetric, tongue midline  Motor: grossly 5/5 UE and LE b/l  Sense: LT intact in all 4 ext. Coord: FTN and HTS intact b/l  DTR: deferred  Gait: narrow base gait    NIH Stroke Scale:   1a  Level of consciousness: 0 - alert; keenly responsive   1b. LOC questions:  0 - answers both questions correctly   1c. LOC commands: 0 - performs both tasks correctly   2. Best Gaze: 0 - normal   3. Visual: 0 - no visual loss   4. Facial Palsy: 0 - normal symmetric movement   5a. Motor left arm: 0 - no drift, limb holds 90 (or 45) degrees for full 10 seconds   5b.   Motor right arm: 0 - no drift, limb holds 90 (or 45) degrees for full 10 seconds 6a. Motor left le - no drift; leg holds 30 degree position for full 5 seconds   6b  Motor right le - no drift; leg holds 30 degree position for full 5 seconds   7. Limb Ataxia: 0 - absent   8. Sensory: 0 - normal; no sensory loss   9. Best Language:  0 - no aphasia, normal   10. Dysarthria: 0 - normal   11. Extinction and Inattention: 0 - no abnormality         Total:   0     MRS: 0      LABS:   Reviewed. Lab Results   Component Value Date    HGB 12.9 (L) 2023    WBC 13.3 (H) 2023     2023     2023    BUN 10 2023    CREATININE 0.35 (L) 2023      Lab Results   Component Value Date/Time    COVID19 Comment 2022 07:59 AM       RADIOLOGY:   Images were personally reviewed includin. Right ICA superior hypophyseal aneurysm wide necked measuring length 1.69 mm, width 2.58 mm x height 2.69 mm    2. The above aneurysm was treated from right radial access, with Surpass Evolve 4.5 mm x 17 mm flow diverter    3. Post flow diverter placement  balloon angioplasty with Hyper form balloon 4 x 15 mm x 2    4. Distal and proximal end good wall apposition of the flow diverter with no evidence of endo leak     ASSESSMENT:   72 y/o M with pmh significant for DM, Htn presented to [facility] for headache and was found to have incidental Right superior hypophyseal aneurysm on CTA head. Patient underwent DSA which confirmed the finding. Patient therefore underwent endovascular flow diverter embolization and balloon angioplasty of the FD. Patient complained of mild headache this am, no weakness or numbness   Neuro exam was non focal. Labs stable. PLAN:   --c/w ASA and Plavix   --c/w Statin   --SBP goal 100-140 mm Hg   --Stable for discharge from Neuro Endovascular point   --F/up with  in 2 weeks after discharge, f/up with  in 3 months after discharge     Case discussed with Dr. Deb Mathur attending.     Rosa Randolph MD  Stroke, Neurocritical [facility]  [id number] [address]  Electronically signed 1/25/2023 at 9:50 AM

## 2023-01-25 NOTE — CARE COORDINATION
CM spoke with patient at bedside to discuss transitional planning. Patient plans to return home with his family and denies any additional transitional plans at this time. Family will provide transportation.     [place] [facility]  Written by: Ines Kirkpatrick RN    Patient Name: Daiana Stuart  Attending Provider: Jaya Granados MD  Admit Date: 2023 12:59 PM  MRN: [id number]  Account: [de-identified]                     : 1950  Discharge Date: 23      Disposition: home    Ines Kirkpatrick RN

## 2023-01-25 NOTE — PLAN OF CARE
Problem: Chronic Conditions and Co-morbidities  Goal: Patient's chronic conditions and co-morbidity symptoms are monitored and maintained or improved  Outcome: Progressing  Flowsheets (Taken 1/24/2023 2000)  Care Plan - Patient's Chronic Conditions and Co-Morbidity Symptoms are Monitored and Maintained or Improved: Monitor and assess patient's chronic conditions and comorbid symptoms for stability, deterioration, or improvement     Problem: Pain  Goal: Verbalizes/displays adequate comfort level or baseline comfort level  Outcome: Progressing     Problem: Discharge Planning  Goal: Discharge to home or other facility with appropriate resources  Outcome: Progressing

## 2023-01-25 NOTE — PROGRESS NOTES
Physical Therapy  Facility/Department: [address] NEURO ICU  Physical Therapy Initial Assessment    Name: Joy Blair  : 1950  MRN: [id number]  Date of Service: 2023    No chief complaint on file. Discharge Recommendations:    No further therapy required at discharge. PT Equipment Recommendations  Equipment Needed: No      Patient Diagnosis(es): The encounter diagnosis was Cerebral aneurysm. Past Medical History:  has a past medical history of Aneurysm ([facility] [address] Diabetes mellitus ([facility] [address] H/O hepatitis, Hyperlipidemia, Hypertension, Sinus infection, and Wellness examination. Past Surgical History:  has a past surgical history that includes eye surgery (Bilateral, 2011); Tonsillectomy (); Cerebral angiogram; and Cerebral angiogram (Bilateral, 2023). Assessment   Assessment: Pt grossly SBA, amb 250' no AD CGA for safety withotu any assist needed. Pt without acute PT need, Physical Therapy to sign off. Therapy Prognosis: Good  Decision Making: Low Complexity  Requires PT Follow-Up: No  Activity Tolerance  Activity Tolerance: Patient tolerated treatment well     Plan   Physcial Therapy Plan  General Plan: Discharge with evaluation only  Safety Devices  Type of Devices:  All fall risk precautions in place, Call light within reach, Left in chair, Patient at risk for falls, Gait belt, Nurse notified  Restraints  Restraints Initially in Place: No     Restrictions  Restrictions/Precautions  Restrictions/Precautions: General Precautions, Fall Risk, Up as Tolerated  Required Braces or Orthoses?: No  Position Activity Restriction  Other position/activity restrictions: SBP , s/p flow diverter embolization and balloon angioplasty      Subjective   General  Chart Reviewed: Yes  Patient assessed for rehabilitation services?: Yes  Response To Previous Treatment: Not applicable  Family / Caregiver Present: No  Follows Commands: Within Functional Limits  General Comment  Comments: RN and pt agreeable to PT. Pt alert in bed upon arrival.  Subjective  Subjective: Pt denies any pain or numbness/ tingling         Social/Functional History  Social/Functional History  Lives With: Spouse  Type of Home: House  Home Layout: Two level, Bed/Bath upstairs, 1/2 bath on main level  Home Access: Stairs to enter without rails  Entrance Stairs - Number of Steps: 4  Bathroom Shower/Tub: Tub/Shower unit  Bathroom Toilet: Handicap height  Bathroom Equipment: Shower chair  Home Equipment: Walker, rolling, Wheelchair-manual (pt reported no use of DME at baseline. pt reported wife uses RW)  Receives Help From: Family  ADL Assistance: Independent  Homemaking Assistance: Independent  Homemaking Responsibilities: Yes  Meal Prep Responsibility: Primary  Laundry Responsibility: Primary  Cleaning Responsibility: Primary  Ambulation Assistance: Independent  Transfer Assistance: Independent  Active : Yes  Mode of Transportation: Truck  Occupation: Part time employment  Type of Occupation: [employer]  Additional Comments: pt reported having to assist wife with transfers.  pt reported sonand sister live across the street and able to assist PRN  Vision/Hearing  Vision  Vision: Impaired  Vision Exceptions: Wears glasses for reading  Hearing  Hearing: Within functional limits    Cognition   Orientation  Overall Orientation Status: Within Functional Limits  Cognition  Overall Cognitive Status: WFL     Objective       AROM RLE (degrees)  RLE AROM: WFL  AROM LLE (degrees)  LLE AROM : WFL  AROM RUE (degrees)  RUE AROM : WFL  AROM LUE (degrees)  LUE AROM : WFL  Strength RLE  Strength RLE: WFL  Strength LLE  Strength LLE: WFL  Strength RUE  Strength RUE: WFL  Strength LUE  Strength LUE: WFL           Bed mobility  Supine to Sit: Stand by assistance  Sit to Supine: Stand by assistance  Scooting: Stand by assistance  Transfers  Sit to Stand: Stand by assistance  Stand to Sit: Stand by assistance  Ambulation  Surface: Level tile  Device: No Device  Assistance: Contact guard assistance  Quality of Gait: good yemi, reciprocal, no LOB or buckling  Distance: 250'  More Ambulation?: No  Stairs/Curb  Stairs?: No     Balance  Posture: Good  Sitting - Static: Good  Sitting - Dynamic: Good  Standing - Static: Good  Standing - Dynamic: Good  Comments: no AD used         AM-PAC Score  AM-PAC Inpatient Mobility Raw Score : 24 (01/25/23 1223)  AM-PAC Inpatient T-Scale Score : 61.14 (01/25/23 1223)  Mobility Inpatient CMS 0-100% Score: 0 (01/25/23 1223)  Mobility Inpatient CMS G-Code Modifier : [address] (01/25/23 1223)        Goals  Short Term Goals  Time Frame for Short Term Goals: d/c PT       Education  Patient Education  Education Given To: Patient  Education Provided: Role of Therapy;Plan of Care  Education Method: Demonstration;Verbal  Barriers to Learning: None  Education Outcome: Verbalized understanding;Demonstrated understanding      Therapy Time   Individual Concurrent Group Co-treatment   Time In 0904         Time Out 0930         Minutes 26         Timed Code Treatment Minutes: 8 Minutes       Nataliia Dodd, PT

## 2023-01-25 NOTE — DISCHARGE INSTRUCTIONS
You were admitted to the hospital after undergoing treatment of a cerebral aneurysm. Continue taking Aspirin 81mg and Plavix 75mg daily. Take your medications as prescribed. Avoid lifting greater than 10 pounds for 14 days post procedure. Follow up in the Neuro Endovascular clinic in 2 weeks with Dr. Regina White and in 3 months with Dr. Morgan Pringle. You blood pressure was mildly elevated intermittently during this admission. Recommend close follow up with your primary care physician to optimize your blood pressure. Call 911 anytime you think you may need emergency care. For example, call if:    You passed out (lost consciousness). You have severe trouble breathing. You have sudden chest pain and shortness of breath, or you cough up blood. It is hard to think, move, speak, or see. Your body is jerking or shaking. Call your doctor now or seek immediate medical care if:    You have a fever with a stiff neck or a severe headache. You are bleeding from the area where the catheter was put in your artery. You have a fast-growing, painful lump at the catheter site. You have signs of infection, such as: Increased pain, swelling, warmth, or redness. Red streaks leading from the incision. Pus draining from the incision. Swollen lymph nodes in your neck, armpits, or groin. A fever. Your leg or hand is painful, looks blue or feels cold, numb, or tingly. You have any sudden vision changes. You have new or worse headaches. You fall and hit your head. You are sleeping more than you are awake. You have a headache and you throw up. You have pain that does not get better after you take pain medicine. You have a fever over 100°F. Watch closely for changes in your health, and be sure to contact your doctor if you have any problems.

## 2023-01-25 NOTE — PROGRESS NOTES
Occupational Therapy  Facility/Department: [address] NEURO ICU  Occupational Therapy Initial Assessment    Name: Karina Raines  : 1950  MRN: [id number]  Date of Service: 2023    Discharge Recommendations:   No occupational therapy recommended at discharge        Patient Diagnosis(es): The encounter diagnosis was Cerebral aneurysm. Past Medical History:  has a past medical history of Aneurysm ([facility] [address] Diabetes mellitus ([facility] [address] H/O hepatitis, Hyperlipidemia, Hypertension, Sinus infection, and Wellness examination. Past Surgical History:  has a past surgical history that includes eye surgery (Bilateral, 2011); Tonsillectomy (); Cerebral angiogram; and Cerebral angiogram (Bilateral, 2023). Assessment   Assessment: pt demonstrated ability to safely and independently complete ADLS and functional transfers/functional mobility. pt with no acute OT needs at this time, please re-order if future needs arise. Prognosis: Good  Decision Making: Low Complexity  REQUIRES OT FOLLOW-UP: No  Activity Tolerance  Activity Tolerance: Patient Tolerated treatment well        Plan   Occupational Therapy Plan  Times Per Week: D/C OT     Restrictions  Restrictions/Precautions  Restrictions/Precautions: Fall Risk  Required Braces or Orthoses?: No  Position Activity Restriction  Other position/activity restrictions: up as tolerated, SBP     Subjective   General  Patient assessed for rehabilitation services?: Yes  Family / Caregiver Present: No  Diagnosis: cerebral aneurysm  General Comment  Comments: RN ok'd for therapy this morning. pt agreeable to participate in session and cooperative/pleasant throughout.  pt denied pain     Social/Functional History  Social/Functional History  Lives With: Spouse  Type of Home: House  Home Layout: Two level, Bed/Bath upstairs, 1/2 bath on main level  Home Access: Stairs to enter without rails  Entrance Stairs - Number of Steps: 4  Bathroom Shower/Tub: Tub/Shower unit  Bathroom Toilet: Handicap height  Bathroom Equipment: Shower chair  Home Equipment: Walker, rolling, Wheelchair-manual (pt reported no use of DME at baseline. pt reported wife uses RW)  Receives Help From: Family  ADL Assistance: Independent  Homemaking Assistance: Independent  Homemaking Responsibilities: Yes  Meal Prep Responsibility: Primary  Laundry Responsibility: Primary  Cleaning Responsibility: Primary  Ambulation Assistance: Independent  Transfer Assistance: Independent  Active : Yes  Mode of Transportation: Truck  Occupation: Part time employment  Type of Occupation: [employer]  Additional Comments: pt reported having to assist wife with transfers. pt reported sonand sister live across the street and able to assist PRN       Objective                Safety Devices  Type of Devices: Call light within reach; Left in chair;Gait belt;Nurse notified  Restraints  Restraints Initially in Place: No    Bed Mobility Training  Bed Mobility Training: Yes  Overall Level of Assistance: Independent  Supine to Sit: Independent  Sit to Supine:  (pt retired to chair at end of session)  Scooting: Independent  Balance  Sitting: Intact (~16 minutes on EOB, and in chair)  Standing: Intact (~6 minutes. pt completed functional mobility to/from bathroom and in hallway to simulate functional distances. pt provided with supervision but no LOB or unsteadiness)  Transfer Training  Transfer Training: Yes  Overall Level of Assistance: Supervision  Sit to Stand: Supervision  Stand to Sit: Supervision  Gait  Overall Level of Assistance: Supervision       AROM: Within functional limits  Strength:  Within functional limits  Coordination: Within functional limits  Tone: Normal  Sensation: Intact    ADL  Feeding: Independent  Grooming: Independent  UE Bathing: Independent  LE Bathing: Modified independent   UE Dressing: Independent  LE Dressing: Modified independent   Toileting: Modified independent   Additional Comments: OT facilitated pt in donning B socks while sitting on EOB and using restroom during session.  pt able to complete with no difficulties             Vision  Vision: Impaired  Vision Exceptions: Wears glasses for reading  Hearing  Hearing: Within functional limits    Cognition  Overall Cognitive Status: WFL  Orientation  Overall Orientation Status: Within Functional Limits                    Education Given To: Patient  Education Provided: Role of Therapy;Plan of Care;ADL Adaptive Strategies  Education Method: Verbal  Barriers to Learning: None  Education Outcome: Verbalized understanding    LUE AROM (degrees)  LUE AROM : WFL  Left Hand AROM (degrees)  Left Hand AROM: WFL  RUE AROM (degrees)  RUE AROM : WFL  Right Hand AROM (degrees)  Right Hand AROM: WFL                                                                 AM-PAC Score        AM-PAC Inpatient Daily Activity Raw Score: 24 (01/25/23 1500)  AM-PAC Inpatient ADL T-Scale Score : 57.54 (01/25/23 1500)  ADL Inpatient CMS 0-100% Score: 0 (01/25/23 1500)  ADL Inpatient CMS G-Code Modifier : CH (01/25/23 1500)          Therapy Time   Individual Concurrent Group Co-treatment   Time In 0903         Time Out 0930         Minutes 27      Co-eval with PT    Timed Code Treatment Minutes: Noemi 6469, OTR/L

## 2023-01-25 NOTE — DISCHARGE SUMMARY
Neuro Critical Care   Discharge Summary      PATIENT NAME: Ramana Kendrick  YOB: 1950  MEDICAL RECORD NO. [id number]  DATE: 1/25/2023  PRIMARY CARE PHYSICIAN: Darrell David MD  ADMISSION DATE:  1/24/23  DISCHARGE DATE:  1/25/23  DISCHARGE DIAGNOSIS:   Patient Active Problem List   Diagnosis Code    Arthritis of knee M17.10    Type 2 diabetes with nephropathy ([facility] [address] E11.21    Dysuria R30.0    Hyperlipidemia E78.5    Essential hypertension I10    Impingement syndrome of left shoulder M75.42    Internal derangement of left knee M23.92    Lightheadedness R42    Male erectile disorder N52.9    Pain in joint of left hand M25.542    Pseudogout M11.20    Tendonitis of shoulder M77.8    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 yo male with a history of DM 2, HTN and HLD who presented to [address] [place] [address] on 1/24/2023 12:59 PM for elective treatment of an unruptured cerebral aneurysm. Patient presented to his PCPs office in July 2022 for complaints of posterior headache with associated dizziness and lightheadedness which was exacerbated by standing. Patient had CTA head and neck performed which demonstrated 3 mm aneurysm in his paraophthalmic right ICA. Patient then underwent diagnostic angiogram in September 2020 which demonstrated the right wide necked ICA ICA aneurysm. On 1/24/23, patient underwent treatment of a right ICA superior hypophyseal aneurysm with surpass evolve flow diverter. Patient admitted to the Neuro ICU for close monitoring post procedure. He did well without any complications. No new neurological deficits. Right radial access site intact without drainage or hematoma. Labs and imaging were followed daily.   At time of discharge, Ramana Kendrick was tolerating a No diet orders on file, having bowel movements, and is in stable condition to be discharged to home. PROCEDURES:    23: Cerebral angiogram with aneurysm treatment as above    PHYSICAL EXAMINATION        Discharge Vitals:  height is 5' 6\" (1.676 m) and weight is 176 lb (79.8 kg). His oral temperature is 98 °F (36.7 °C). His blood pressure is 148/60 (abnormal) and his pulse is 74. His respiration is 15 and oxygen saturation is 92%. CONSTITUTIONAL:  Well developed, well nourished. Alert and oriented x 3, in no acute distress. GCS 15. Nontoxic. No dysarthria. No aphasia. HEAD:  normocephalic, atraumatic    EYES:  PERRL, EOMI   ENT:  moist mucous membranes   NECK:  supple, symmetric   LUNGS:  Equal air entry bilaterally, clera   CARDIOVASCULAR:  normal s1 / s2, RRR, distal pulses intact   ABDOMEN:  Soft, no rigidity, normal bowel sounds   NEUROLOGIC:  Mental Status:  A & O x3. Following commands. Cranial Nerves:    cranial nerves II-XII are grossly intact    Motor Exam:    Drift:  absent  Tone:  normal    Motor exam is symmetrical 5 out of 5 all extremities bilaterally    Sensory:    Touch:    Right Upper Extremity:  normal  Left Upper Extremity:  normal  Right Lower Extremity:  normal  Left Lower Extremity:  normal    Coordination/Dysmetria:  Finger to Nose:   Right:  normal  Left:  normal      NIH Stroke Scale Total (if not done complete detailed one below):    1a.  Level of consciousness:  0 - alert; keenly responsive  1b. Level of consciousness questions:  0 - answers both questions correctly  1c. Level of consciousness questions:  0 - performs both tasks correctly  2. Best Gaze:  0 - normal  3. Visual:  0 - no visual loss  4. Facial Palsy:  0 - normal symmetric movement  5a. Motor left arm:  0 - no drift, limb holds 90 (or 45) degrees for full 10 seconds  5b. Motor right arm:  0 - no drift, limb holds 90 (or 45) degrees for full 10 seconds  6a.   Motor left le - no drift; leg holds 30 degree position for full 5 seconds  6b. Motor right le - no drift; leg holds 30 degree position for full 5 seconds  7. Limb Ataxia:  0 - absent  8. Sensory:  0 - normal; no sensory loss  9. Best Language:  0 - no aphasia, normal  10. Dysarthria:  0 - normal  11. Extinction and Inattention:  0 - no abnormality  TOTAL:       LABS/IMAGING     Recent Labs     23  0837 23  0550   WBC  --  13.3*   HGB  --  12.9*   HCT  --  39.3*   PLT  --  326   NA  --  138   K  --  4.2   CL  --  106   CO2  --  23   BUN  --  10   CREATININE 0.60 0.35*       IR ANGIOGRAM CAROTID CEREBRAL BILATERAL    Result Date: 2023  Date of Service: 2023 Patient arrived to the angio suite at: 09:07 am Anesthesia/sedation initiated at: 09:42 am Puncture obtained at: 09:53 am Vascular access was removed at: 11:41 am Manual pressure for minutes: 0 Patient wheeled out of the angio suite at: 11:57 am Diagnosis: Right ICA superior hypophyseal aneurysm Total anesthesia/sedation: 135  mins. Procedures: 1. Right ultrasound guided radial artery access 2. Right radial artery angiogram 3. Selective right common carotid artery (CCA) cerebral angiogram 4. Selective right internal carotid artery (ICA) cerebral angiogram 5. Transarterial flow diverter (FD) device embolization of the Right ICA  aneurysm 6. Continuous IA nicardipine infusion Neurointerventionalist: Mariela Zamudio MD [place]: Walter Kendrcik MD Contrast: 54 cc of Visipaque-320. Fluoroscopy time: 33.7 minutes Access: Right radial artery. Comparison: None Consent: After explaining the risks and benefits to the patient and the patient's family, including but not limited to hemorrhagic and ischemic stroke, coma, death, vessel injury, dissection, tear, occlusion, and X-ray dye allergic type reaction, a signed consent form was obtained.  Indication and Clinical History: 72 y/o M with pmh significant for DM, Htn presented to [facility] for headache and was found to have incidental Right superior hypophyseal aneurysm on CTA head. Patient underwent DSA which confirmed the finding. Patient complains of headache about 1-2 times/ month. Patient is a ex-smoker. No family history of intracranial aneurysm. Patient was therefore recommended to have right ICA aneurysm endovascular flow diverter embolization. Anesthesia: Local anesthesia with lidocaine. General Anesthesia. Description and findings: The patient's right wrist was prepped and draped in standard sterile fashion and under local anesthesia with conscious sedation. Ultrasound was used to insonate the right radial artery, which  appeared to be completely compressible and patent. Right radial artery was then accessed with a micropuncture technique under direct visualization, and a 6 Croatian short sheath was placed within the right radial artery, establishing arterial access using Seldinger technique. Images were captured and stored in the pacs. Radial artery access cocktail including 2.5 mg of Verapamil, 200 mcg of Nitroglycerine and 2000 U of Heparin was administered. 70 units/kg of Heparin was administered for a target ACT of 250-300. Right radial artery angiogram: Right radial artery angiogram showed appropriate arterial catheterization, with no evidence of vasospasm, dissection or stenosis. Under smart mask, select catheter and Rist guide catheter were advanced coaxially over the glidewire upto the subclavian artery. Right CCA technique: The right common carotid artery was selectively catheterized under fluoroscopic guidance and digital subtraction angiography images were obtained in biplane projections of the right cervical carotid artery. Interpretation: The right common carotid artery injection demonstrates normal antegrade flow into the external and internal carotid arteries with normal filling of the external carotid artery branches.   A calcified atherosclerotic plaque was noted at the origin of the right ICA with no e/o hemodynamically significant stenosis. Course and caliber of the cervical portion of the right internal carotid artery are unremarkable. Further inspection demonstrates no evidence of dissection, stenosis, aneurysm, or other vascular  abnormality within the right CCA into the cervical segment of the right ICA. Right ICA technique: The right internal carotid artery was then selectively catheterized, and digital subtraction angiography of the intracranial right internal carotid circulation was performed in frontal, and lateral projections. Interpretation: There is normal antegrade filling of the distal internal carotid artery, ophthalmic artery, anterior cerebral artery, middle cerebral artery and the distal branches. A inferiorly and medially pointed wide necked superior hypophyseal aneurysm was noted measuring length Length 1.69 mm, width 2.58 mm,  height 2.69 mm. A small fusiform dilatation was noted at the base of the aneurysm. Inspection of the remaining right internal carotid circulation revealed no other evidence of cerebral aneurysm, arteriovenous malformation, or arterial stenosis. Capillary and venous phase images were also unremarkable, with no evidence of veno-occlusive disease. Continuous nicardipine infusion was started through the Rist sheath. Right ICA aneurysm flow diverter device embolization: The long sheath was advanced to the right distal cervical ICA over the select catheter, and the select catheter was removed. An Excelsior XT-27 microcatheter was advanced over a Synchro2 014 microwire and placed into the distal right M-1 middle cerebral artery. The microwire was removed and a 4.5x15mm surpass evolve flow diverter device was prepped and advanced. The system was pulled back to the right anterior cerebral artery - middle cerebral artery bifurcation, and the device was deployed carefully across the aneurysm under roadmap. Post flow diverter deployment angiographic run:  In this angiographic run, the proximal end of the flow diverter appeared to be well apposed to the right ICA. The distal end of the deployed flow diverter was noted to be not fully apposed to the flow with a possible retrograde endo leak. No evidence of distal embolization. The decision was made to proceed with endovascular balloon angioplasty of the distal un-apposed end of the flow diverter. A hyper form 4 x 15 mm compliant ballon was then prepped and advanced over the X-pedion micro wire at the distal end of the flow diverter. After confirming the position of the balloon at the distal end of the flow diverter, balloon angioplasty was performed under continuous fluoroscopy. After first balloon angioplasty, distal end of the flow diverter was still noted to be un-apposed. Therefore second balloon angioplasty performed under continuous fluoroscopic guidance. Balloon and wire were then removed from the body. Final angiographic run: In this final angiographic run, the distal end of the surpass evolve was noted to be well apposed to the parent vessel. No evidence of in-stent thrombosis. The aneurysm was noted to have residual flow, Bob score class III. There was no evidence of distal embolization. After reviewing the final images, Rist catheter was removed from the body. 20 units of Protamine was administered to reverse the anticoagulant  effect of Heparin. TR band was used to establish hemostasis at the right radial artery access site. No immediate complications were experienced. Patient was extubated. Patient was re-examined after the procedure with no change noted in their neurologic examination, with distal pulses present. The patient was subsequently discharged from the neurointerventional suite to the neurointensive care unit. Impression: 1. Right ICA superior hypophyseal aneurysm wide necked measuring length 1.69 mm, width 2.58 mm x height 2.69 mm 2.   The above aneurysm was treated from right radial access, with Surpass Evolve 4.5 mm x 17 mm flow diverter 3. Post flow diverter placement  balloon angioplasty with Hyper form balloon 4 x 15 mm x 2 4. Distal and proximal end good wall apposition of the flow diverter with no evidence of endo leak Dr. Verito Brown dictated this invasive procedure. Dr Marta Gusman was present for all procedural and imaging components of this case. Examination was reviewed and reported findings confirmed and evaluated by Dr. Marta Gusman. DISCHARGE INSTRUCTIONS     Discharge Medications:        Medication List        START taking these medications      aspirin 81 MG chewable tablet  Take 1 tablet by mouth daily  Start taking on: January 26, 2023  Replaces: aspirin 325 MG EC tablet            CONTINUE taking these medications      amLODIPine-benazepril 5-10 MG per capsule  Commonly known as: Lotrel  Take 1 capsule by mouth daily     * blood glucose test strips  Test one times a day & as needed for symptoms of irregular blood glucose. Type 2 DM not on insulin     * True Metrix Blood Glucose Test strip  Generic drug: blood glucose test strips  1 each by Other route daily Type 2 DM not on insulin     clopidogrel 75 MG tablet  Commonly known as: PLAVIX     metFORMIN 500 MG tablet  Commonly known as: GLUCOPHAGE  Take 1 tablet by mouth once daily with breakfast     Multi Vitamin Daily Tabs     simvastatin 80 MG tablet  Commonly known as: ZOCOR     tamsulosin 0.4 MG capsule  Commonly known as: FLOMAX  Take 1 capsule by mouth daily     * TRUEplus Lancets 30G Misc  USE AS DIRECTED ONCE DAILY     * Lancets Misc  USE 1  TO CHECK GLUCOSE ONCE DAILY           * This list has 4 medication(s) that are the same as other medications prescribed for you. Read the directions carefully, and ask your doctor or other care provider to review them with you.                 STOP taking these medications      aspirin 325 MG EC tablet  Replaced by: aspirin 81 MG chewable tablet     naproxen 500 MG tablet  Commonly known as: NAPROSYN Where to Get Your Medications        These medications were sent to [facility] [address], [address]  [address], [address] [place] [address]      Phone: [phone]   aspirin 81 MG chewable tablet       Diet: No diet orders on file diet as tolerated  Activity: No lifting greater than 10 pounds for 14 days post procedure. Follow-up:  PCP in 1-2 weeks. Neuro Endovascular clinic in 2 weeks with Dr. Owen Nelson and 3 months with Dr. Janet Langley.   Time Spent for discharge: 30 minutes    EMPERATRIZ Moore - CNP  Neuro Critical Care  1/25/2023, 2:34 PM

## 2023-01-25 NOTE — PROGRESS NOTES
Discussed medication(s) with the patient and all questions fully answered. Educated on when to follow up with PCP. No concerns noted at this time. Waiting on meds to beds.     Patient being transported home by Daughter, Gonzlaez Obrien

## 2023-01-26 ENCOUNTER — TELEPHONE (OUTPATIENT)
Dept: PRIMARY CARE CLINIC | Age: 73
End: 2023-01-26

## 2023-01-26 NOTE — TELEPHONE ENCOUNTER
Care Transitions Initial Follow Up Call    Outreach made within 2 business days of discharge: Yes    Patient: Nicolasa Anderson Patient : 1950   MRN: [id number]  Reason for Admission: There are no discharge diagnoses documented for the most recent discharge. Discharge Date: 23       Spoke with: DUANE    Discharge department/facility: [facility] Interactive Patient Contact:  Was patient able to fill all prescriptions: Yes  Was patient instructed to bring all medications to the follow-up visit: Yes  Is patient taking all medications as directed in the discharge summary?  Yes  Does patient understand their discharge instructions: Yes  Does patient have questions or concerns that need addressed prior to 7-14 day follow up office visit: no    Scheduled appointment with PCP within 7-14 days    Follow Up  Future Appointments   Date Time Provider [place]   2023 11:20 AM MD ALANA Ochoa [id number]   4/10/2023  9:00 AM MD ALANA Ochoa MA

## 2023-01-30 ENCOUNTER — OFFICE VISIT (OUTPATIENT)
Dept: PRIMARY CARE CLINIC | Age: 73
End: 2023-01-30

## 2023-01-30 VITALS
BODY MASS INDEX: 27.64 KG/M2 | HEART RATE: 91 BPM | WEIGHT: 172 LBS | OXYGEN SATURATION: 98 % | HEIGHT: 66 IN | SYSTOLIC BLOOD PRESSURE: 128 MMHG | DIASTOLIC BLOOD PRESSURE: 64 MMHG

## 2023-01-30 DIAGNOSIS — E78.49 OTHER HYPERLIPIDEMIA: ICD-10-CM

## 2023-01-30 DIAGNOSIS — E11.9 TYPE 2 DIABETES MELLITUS WITHOUT COMPLICATION, WITHOUT LONG-TERM CURRENT USE OF INSULIN (HCC): ICD-10-CM

## 2023-01-30 DIAGNOSIS — Z09 HOSPITAL DISCHARGE FOLLOW-UP: Primary | ICD-10-CM

## 2023-01-30 RX ORDER — SIMVASTATIN 80 MG
80 TABLET ORAL NIGHTLY
Qty: 90 TABLET | Refills: 1 | Status: SHIPPED | OUTPATIENT
Start: 2023-01-30

## 2023-01-30 SDOH — ECONOMIC STABILITY: FOOD INSECURITY: WITHIN THE PAST 12 MONTHS, THE FOOD YOU BOUGHT JUST DIDN'T LAST AND YOU DIDN'T HAVE MONEY TO GET MORE.: NEVER TRUE

## 2023-01-30 SDOH — ECONOMIC STABILITY: FOOD INSECURITY: WITHIN THE PAST 12 MONTHS, YOU WORRIED THAT YOUR FOOD WOULD RUN OUT BEFORE YOU GOT MONEY TO BUY MORE.: NEVER TRUE

## 2023-01-30 ASSESSMENT — ENCOUNTER SYMPTOMS: RESPIRATORY NEGATIVE: 1

## 2023-01-30 ASSESSMENT — SOCIAL DETERMINANTS OF HEALTH (SDOH): HOW HARD IS IT FOR YOU TO PAY FOR THE VERY BASICS LIKE FOOD, HOUSING, MEDICAL CARE, AND HEATING?: NOT HARD AT ALL

## 2023-01-30 NOTE — PROGRESS NOTES
Post-Discharge Transitional Care  Follow Up      Soham Aguirre   YOB: 1950    Date of Office Visit:  1/30/2023  Date of Hospital Admission: 1/24/23  Date of Hospital Discharge: 1/25/23  Risk of hospital readmission (high >=14%. Medium >=10%) :Readmission Risk Score: 9.2      Care management risk score Rising risk (score 2-5) and Complex Care (Scores >=6): No Risk Score On File     Non face to face  following discharge, date last encounter closed (first attempt may have been earlier): 01/26/2023    Call initiated 2 business days of discharge: Yes    ASSESSMENT/PLAN:   Hospital discharge follow-up  -     [place] DISCHARGE MEDS RECONCILED W/ CURRENT OUTPATIENT MED LIST  Type 2 diabetes mellitus without complication, without long-term current use of insulin (HCC)  -     simvastatin (ZOCOR) 80 MG tablet; Take 1 tablet by mouth nightly, Disp-90 tablet, R-1Normal  Other hyperlipidemia  -     simvastatin (ZOCOR) 80 MG tablet; Take 1 tablet by mouth nightly, Disp-90 tablet, R-1Normal    Medical Decision Making: moderate complexity  Return in 3 months (on 4/30/2023). Subjective:   HPI:  Follow up of Hospital problems/diagnosis(es): cerebral aneurysm coil. Inpatient course: Discharge summary reviewed- see chart. Interval history/Current status: feels ok, mild frontal head discomfort.      Patient Active Problem List   Diagnosis    Arthritis of knee    Type 2 diabetes with nephropathy (HCC)    Dysuria    Hyperlipidemia    Essential hypertension    Impingement syndrome of left shoulder    Internal derangement of left knee    Lightheadedness    Male erectile disorder    Pain in joint of left hand    Pseudogout    Tendonitis of shoulder    Inverse psoriasis    Asymptomatic stenosis of left carotid artery    Right internal carotid artery aneurysm    Aneurysm (HCC)    Cerebral aneurysm       Medications listed as ordered at the time of discharge from hospital     Medication List            Accurate as of January 30, 2023 11:49 AM. If you have any questions, ask your nurse or doctor. CONTINUE taking these medications      amLODIPine-benazepril 5-10 MG per capsule  Commonly known as: Lotrel  Take 1 capsule by mouth daily     aspirin 81 MG chewable tablet  Take 1 tablet by mouth daily     * blood glucose test strips  Test one times a day & as needed for symptoms of irregular blood glucose. Type 2 DM not on insulin     * True Metrix Blood Glucose Test strip  Generic drug: blood glucose test strips  1 each by Other route daily Type 2 DM not on insulin     clopidogrel 75 MG tablet  Commonly known as: PLAVIX     metFORMIN 500 MG tablet  Commonly known as: GLUCOPHAGE  Take 1 tablet by mouth once daily with breakfast     Multi Vitamin Daily Tabs     simvastatin 80 MG tablet  Commonly known as: ZOCOR  Take 1 tablet by mouth nightly     tamsulosin 0.4 MG capsule  Commonly known as: FLOMAX  Take 1 capsule by mouth daily     * TRUEplus Lancets 30G Misc  USE AS DIRECTED ONCE DAILY     * Lancets Misc  USE 1  TO CHECK GLUCOSE ONCE DAILY           * This list has 4 medication(s) that are the same as other medications prescribed for you. Read the directions carefully, and ask your doctor or other care provider to review them with you.                    Where to Get Your Medications        These medications were sent to [facility], [address] [place] [address]      Phone: [phone]   simvastatin 80 MG tablet           Medications marked \"taking\" at this time  Outpatient Medications Marked as Taking for the 1/30/23 encounter (Office Visit) with Mellissa Boyd MD   Medication Sig Dispense Refill    simvastatin (ZOCOR) 80 MG tablet Take 1 tablet by mouth nightly 90 tablet 1    aspirin 81 MG chewable tablet Take 1 tablet by mouth daily 30 tablet 3    clopidogrel (PLAVIX) 75 MG tablet Take 75 mg by mouth daily      Lancets MISC USE 1  TO CHECK GLUCOSE ONCE DAILY 100 each 1    metFORMIN (GLUCOPHAGE) 500 MG tablet Take 1 tablet by mouth once daily with breakfast 90 tablet 2    amLODIPine-benazepril (LOTREL) 5-10 MG per capsule Take 1 capsule by mouth daily 30 capsule 3    blood glucose test strips (TRUE METRIX BLOOD GLUCOSE TEST) strip 1 each by Other route daily Type 2 DM not on insulin 50 each 5    tamsulosin (FLOMAX) 0.4 MG capsule Take 1 capsule by mouth daily 90 capsule 3    blood glucose monitor strips Test one times a day & as needed for symptoms of irregular blood glucose. Type 2 DM not on insulin 50 strip 3    TRUEplus Lancets 30G MISC USE AS DIRECTED ONCE DAILY 100 each 3    Multiple Vitamin (MULTI VITAMIN DAILY) TABS Take 1 tablet by mouth daily          Medications patient taking as of now reconciled against medications ordered at time of hospital discharge: Yes  ROS: no SOB< no chest pain, no lightheadedness. Objective:    /64   Pulse 91   Ht 5' 6\" (1.676 m)   Wt 172 lb (78 kg)   SpO2 98%   BMI 27.76 kg/m²     Physical Exam  Vitals and nursing note reviewed. Constitutional:       General: He is not in acute distress. Appearance: He is well-developed. He is not ill-appearing. HENT:      Head: Normocephalic and atraumatic. Right Ear: External ear normal.      Left Ear: External ear normal.   Eyes:      General: No scleral icterus. Right eye: No discharge. Left eye: No discharge. Conjunctiva/sclera: Conjunctivae normal.   Neck:      Thyroid: No thyromegaly. Trachea: No tracheal deviation. Cardiovascular:      Rate and Rhythm: Normal rate and regular rhythm. Heart sounds: Normal heart sounds. Pulmonary:      Effort: Pulmonary effort is normal. No respiratory distress. Breath sounds: Normal breath sounds. No wheezing. Musculoskeletal:      Right lower leg: No edema. Left lower leg: No edema. Lymphadenopathy:      Cervical: No cervical adenopathy.   Skin:     General: Skin is warm.      Findings: No rash.   Neurological:      Mental Status: He is alert and oriented to person, place, and time.   Psychiatric:         Mood and Affect: Mood normal.         Behavior: Behavior normal.         Thought Content: Thought content normal.        An electronic signature was used to authenticate this note.  --Roma Ramos MD

## 2023-01-30 NOTE — PROGRESS NOTES
[facility] PRIMARY [facility]ARE  [address]  [facility] [address]  Dept: [phone]    Vipin Cousin is a 68 y.o. male Established patient, who presents today for his medical conditions/complaintsas noted below.       Chief Complaint   Patient presents with    Follow-Up from [facility] [place] f/u from [facility] and Tdap - has to get done at pharmacy due to insurance       HPI:     HPI    Home sugars : 116, 140, 138, 126  No low sugars    Bruising on arms from procedure  Reviewed prior notes Neurology  Reviewed previous Labs, Imaging, and Hospital Records    LDL Calculated (mg/dL)   Date Value   2022 96   2021 80   2020 61       (goal LDL is <100)   BUN (mg/dL)   Date Value   2023 10     Hemoglobin A1C (%)   Date Value   2022 6.4     BP Readings from Last 3 Encounters:   23 128/64   23 (!) 148/60   23 (!) 160/70          (goal 120/80)    Past Medical History:   Diagnosis Date    Aneurysm ([facility] [address]     rt superior hypophyseal    Diabetes mellitus ([address]     H/O hepatitis     Hyperlipidemia     Hypertension     Sinus infection     Wellness examination     PCP Dr Keenan Stephens; last visit 22      Past Surgical History:   Procedure Laterality Date    CEREBRAL ANGIOGRAM      CEREBRAL ANGIOGRAM Bilateral 2023    CAROTID CEREBRAL BILATERAL    EYE SURGERY Bilateral 2011    cataract    TONSILLECTOMY  1976       Family History   Problem Relation Age of Onset    Cancer Mother         breast cancer    Diabetes Mother     Other Mother         renal disease    Diabetes Father     Diabetes Sister     Diabetes Brother     Kidney stones Brother     Diabetes Maternal Aunt        Social History     Tobacco Use    Smoking status: Former     Packs/day: 0.10     Years: 1.00     Pack years: 0.10     Types: Cigarettes     Quit date: 1981     Years since quittin.1    Smokeless tobacco: Never Substance Use Topics    Alcohol use: No     Alcohol/week: 0.0 standard drinks      Current Outpatient Medications   Medication Sig Dispense Refill    simvastatin (ZOCOR) 80 MG tablet Take 1 tablet by mouth nightly 90 tablet 1    aspirin 81 MG chewable tablet Take 1 tablet by mouth daily 30 tablet 3    clopidogrel (PLAVIX) 75 MG tablet Take 75 mg by mouth daily      Lancets MISC USE 1  TO CHECK GLUCOSE ONCE DAILY 100 each 1    metFORMIN (GLUCOPHAGE) 500 MG tablet Take 1 tablet by mouth once daily with breakfast 90 tablet 2    amLODIPine-benazepril (LOTREL) 5-10 MG per capsule Take 1 capsule by mouth daily 30 capsule 3    blood glucose test strips (TRUE METRIX BLOOD GLUCOSE TEST) strip 1 each by Other route daily Type 2 DM not on insulin 50 each 5    tamsulosin (FLOMAX) 0.4 MG capsule Take 1 capsule by mouth daily 90 capsule 3    blood glucose monitor strips Test one times a day & as needed for symptoms of irregular blood glucose. Type 2 DM not on insulin 50 strip 3    TRUEplus Lancets 30G MISC USE AS DIRECTED ONCE DAILY 100 each 3    Multiple Vitamin (MULTI VITAMIN DAILY) TABS Take 1 tablet by mouth daily       No current facility-administered medications for this visit.      Allergies   Allergen Reactions    Cialis [Tadalafil]      Leg pain/cramps    Ranitidine      Leg spasms       Health Maintenance   Topic Date Due    DTaP/Tdap/Td vaccine (1 - Tdap) Never done    Shingles vaccine (1 of 2) Never done    Diabetic Alb to Cr ratio (uACR) test  07/12/2022    Diabetic retinal exam  10/25/2022    Annual Wellness Visit (AWV)  02/01/2023    Diabetic foot exam  11/14/2023    A1C test (Diabetic or Prediabetic)  11/14/2023    Lipids  11/25/2023    Depression Screen  01/11/2024    GFR test (Diabetes, CKD 3-4, OR last GFR 15-59)  01/25/2024    Colorectal Cancer Screen  02/07/2029    Flu vaccine  Completed    Pneumococcal 65+ years Vaccine  Completed    COVID-19 Vaccine  Completed    AAA screen  Completed    Hepatitis C screen Completed    Hepatitis A vaccine  Aged Out    Hib vaccine  Aged Out    Meningococcal (ACWY) vaccine  Aged Out       Subjective:      Review of Systems   Respiratory: Negative. Cardiovascular: Negative. Neurological:  Negative for dizziness and light-headedness. Frontal head discomfort. Objective:     /64   Pulse 91   Ht 5' 6\" (1.676 m)   Wt 172 lb (78 kg)   SpO2 98%   BMI 27.76 kg/m²   Physical Exam  Vitals and nursing note reviewed. Constitutional:       General: He is not in acute distress. Appearance: He is well-developed. He is not ill-appearing. HENT:      Head: Normocephalic and atraumatic. Right Ear: External ear normal.      Left Ear: External ear normal.   Eyes:      General: No scleral icterus. Right eye: No discharge. Left eye: No discharge. Conjunctiva/sclera: Conjunctivae normal.   Neck:      Thyroid: No thyromegaly. Trachea: No tracheal deviation. Cardiovascular:      Rate and Rhythm: Normal rate and regular rhythm. Heart sounds: Normal heart sounds. Pulmonary:      Effort: Pulmonary effort is normal. No respiratory distress. Breath sounds: Normal breath sounds. No wheezing. Musculoskeletal:      Right lower leg: No edema. Left lower leg: No edema. Lymphadenopathy:      Cervical: No cervical adenopathy. Skin:     General: Skin is warm. Findings: Bruising present. No rash. Comments: Bruising and right anterior proximal forearm. Also bruising in left distal anterior forearm. He states the left arm is the one that is most painful. No hematoma was palpated on either arm. Neurological:      Mental Status: He is alert and oriented to person, place, and time. Psychiatric:         Mood and Affect: Mood normal.         Behavior: Behavior normal.         Thought Content: Thought content normal.       Assessment:       Diagnosis Orders   1.  Hospital discharge follow-up  [place] DISCHARGE MEDS RECONCILED W/ CURRENT OUTPATIENT MED LIST      2. Type 2 diabetes mellitus without complication, without long-term current use of insulin (HCC)  simvastatin (ZOCOR) 80 MG tablet      3. Other hyperlipidemia  simvastatin (ZOCOR) 80 MG tablet             Plan:      Return in 3 months (on 4/30/2023). Orders Placed This Encounter   Procedures    [place] DISCHARGE MEDS RECONCILED W/ CURRENT OUTPATIENT MED LIST     Orders Placed This Encounter   Medications    simvastatin (ZOCOR) 80 MG tablet     Sig: Take 1 tablet by mouth nightly     Dispense:  90 tablet     Refill:  1       Patient given educationalmaterials - see patient instructions. Discussed use, benefit, and side effectsof prescribed medications. All patient questions answered. Pt voiced understanding. Reviewed health maintenance. Instructed to continue current medications, diet andexercise. Patient agreed with treatment plan. Follow up as directed.      Electronicallysigned by Mellissa Boyd MD on 1/30/2023 at 11:47 AM

## 2023-02-17 ENCOUNTER — OFFICE VISIT (OUTPATIENT)
Dept: NEUROLOGY | Age: 73
End: 2023-02-17

## 2023-02-17 VITALS
WEIGHT: 172 LBS | HEART RATE: 100 BPM | BODY MASS INDEX: 27.64 KG/M2 | DIASTOLIC BLOOD PRESSURE: 75 MMHG | TEMPERATURE: 97.7 F | SYSTOLIC BLOOD PRESSURE: 148 MMHG | HEIGHT: 66 IN | OXYGEN SATURATION: 93 %

## 2023-02-17 DIAGNOSIS — I72.9 ANEURYSM (HCC): Primary | ICD-10-CM

## 2023-02-17 NOTE — PROGRESS NOTES
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 Patient underwent endovascular flow diverter embolization of the aneurysm with surpass evolve on 01/24/2023 without any complications. Patient stated today, since the procedure, his headaches got better. Patient however feels sleepy and tired sometimes. Patient continues to take aspirin and Plavix. Patient denied any focal weakness or numbness. Allergies  is allergic to cialis [tadalafil] and ranitidine. Medications  Prior to Admission medications    Medication Sig Start Date End Date Taking? Authorizing Provider   simvastatin (ZOCOR) 80 MG tablet Take 1 tablet by mouth nightly 1/30/23  Yes Jaky Grijalva MD   aspirin 81 MG chewable tablet Take 1 tablet by mouth daily 1/26/23  Yes EMPERATRIZ Ortiz - CNP   clopidogrel (PLAVIX) 75 MG tablet Take 75 mg by mouth daily   Yes Historical Provider, MD   Lancets MISC USE 1  TO CHECK GLUCOSE ONCE DAILY 12/19/22  Yes Jaky Grijalva MD   metFORMIN (GLUCOPHAGE) 500 MG tablet Take 1 tablet by mouth once daily with breakfast 11/18/22  Yes Jaky Grijalva MD   amLODIPine-benazepril (LOTREL) 5-10 MG per capsule Take 1 capsule by mouth daily 11/14/22  Yes Jaky Grijalva MD   blood glucose test strips (TRUE METRIX BLOOD GLUCOSE TEST) strip 1 each by Other route daily Type 2 DM not on insulin 7/11/22  Yes Jaky Grijavla MD   tamsulosin [facility]) 0.4 MG capsule Take 1 capsule by mouth daily 3/7/22  Yes Cindy Duong MD   blood glucose monitor strips Test one times a day & as needed for symptoms of irregular blood glucose.  Type 2 DM not on insulin 7/19/21  Yes MD TEZ Mezaplus Lancets 30G MISC USE AS DIRECTED ONCE DAILY 6/8/20 Yes Kelli Gracia MD   Multiple Vitamin (MULTI VITAMIN DAILY) TABS Take 1 tablet by mouth daily   Yes Historical Provider, MD    Scheduled Meds:  Continuous Infusions:  PRN Meds:.  Past Medical History   has a past medical history of Aneurysm ([facility] [address] [address] Diabetes mellitus ([facility] [address] H/O hepatitis, Hyperlipidemia, Hypertension, Sinus infection, and Wellness examination. Past Surgical History   has a past surgical history that includes eye surgery (Bilateral, 01/01/2011); Tonsillectomy (1976); Cerebral angiogram; and Cerebral angiogram (Bilateral, 01/24/2023). Social History   reports that he quit smoking about 42 years ago. His smoking use included cigarettes. He has a 0.10 pack-year smoking history. He has never used smokeless tobacco.   reports no history of alcohol use. reports no history of drug use. Family History  family history includes Cancer in his mother; Diabetes in his brother, father, maternal aunt, mother, and sister; Kidney stones in his brother; Other in his mother. Review of Systems:  CONSTITUTIONAL:  negative for fevers, chills, fatigue and malaise    EYES:  negative for double vision, blurred vision and photophobia     HEENT:  negative for tinnitus, epistaxis and sore throat    RESPIRATORY:  negative for cough, shortness of breath, wheezing    CARDIOVASCULAR:  negative for chest pain, palpitations, syncope, edema    GASTROINTESTINAL:  negative for nausea, vomiting    GENITOURINARY:  negative for incontinence    MUSCULOSKELETAL:  negative for neck or back pain    NEUROLOGICAL:  Negative for weakness and tingling  negative for headaches and dizziness    PSYCHIATRIC:  negative for anxiety      Review of systems otherwise negative.       OBJECTIVE:     Vitals:    02/17/23 1509   BP: (!) 148/75   Pulse: 100   Temp: 97.7 °F (36.5 °C)   SpO2: 93%        General:  Gen: normal habitus, NAD  HEENT: NCAT, mucosa moist  Cvs: RRR, S1 S2 normal  Resp: symmetric unlabored breathing  Abd: s/nd/nt  Ext: no edema  Skin: no lesions seen, warm and dry    Neuro:  Gen: awake and alert, oriented x3. Lang/speech: no aphasia or dysarthria. Follows commands. CN: PERRL, EOMI, VFF, V1-3 intact, face symmetric, hearing intact, shoulder shrug symmetric, tongue midline  Motor: grossly 5/5 UE and LE b/l  Sense: LT intact in all 4 ext. Coord: FTN and HTS intact b/l  DTR: deferred  Gait: narrow base gait    NIH Stroke Scale:   1a  Level of consciousness: 0 - alert; keenly responsive   1b. LOC questions:  0 - answers both questions correctly   1c. LOC commands: 0 - performs both tasks correctly   2. Best Gaze: 0 - normal   3. Visual: 0 - no visual loss   4. Facial Palsy: 0 - normal symmetric movement   5a. Motor left arm: 0 - no drift, limb holds 90 (or 45) degrees for full 10 seconds   5b. Motor right arm: 0 - no drift, limb holds 90 (or 45) degrees for full 10 seconds   6a. Motor left le - no drift; leg holds 30 degree position for full 5 seconds   6b  Motor right le - no drift; leg holds 30 degree position for full 5 seconds   7. Limb Ataxia: 0 - absent   8. Sensory: 0 - normal; no sensory loss   9. Best Language:  0 - no aphasia, normal   10. Dysarthria: 0 - normal   11. Extinction and Inattention: 0 - no abnormality         Total:   0     MRS: 0      LABS:   Reviewed.   Lab Results   Component Value Date    HGB 12.9 (L) 2023    WBC 13.3 (H) 2023     2023     2023    BUN 10 2023    CREATININE 0.35 (L) 2023      Lab Results   Component Value Date/Time    COVID19 Comment 2022 07:59 AM       RADIOLOGY:   Images were personally reviewed including:  IR 2023:   Right ICA superior hypophyseal aneurysm wide necked measuring width 2.58 mm x height 2.69 mm   The above aneurysm was treated from right radial access, with Surpass Evolve 4.5 mm x 17 mm flow diverter   Post flow diverter balloon angioplasty with Hyperform balloon 4 x 15 mm x 2   Distal and proximal end well apposition of the flow diverter with no evidence of endo leak            ASSESSMENT:   72 y/o M with pmh significant for DM, Htn, headache, found to have incidental right ICA superior hypophyseal aneurysm. Patient underwent endovascular flow diverter embolization of the aneurysm with surpass evolve on 01/24/2023 without any complications. Patient's neurological examination was nonfocal. NIHSS was 0. PLAN:   --c/w ASA and Plavix   --SBP goal 100-140 mm Hg   --F/up MRA head with and without contrast in 3 months   --F/up with  on 05/15 at 9:30 am     Case discussed with Dr. Bennett Monzon attending. Patient to be followed by Dr. Madan Oneil.      Kathy Wick MD    Stroke, [facility]  [address]  Electronically signed 2/17/2023 at 4:26 PM

## 2023-04-04 DIAGNOSIS — I10 ESSENTIAL HYPERTENSION: ICD-10-CM

## 2023-04-04 RX ORDER — AMLODIPINE BESYLATE AND BENAZEPRIL HYDROCHLORIDE 5; 10 MG/1; MG/1
CAPSULE ORAL
Qty: 30 CAPSULE | Refills: 4 | Status: SHIPPED | OUTPATIENT
Start: 2023-04-04

## 2023-04-19 DIAGNOSIS — E11.9 TYPE 2 DIABETES MELLITUS WITHOUT COMPLICATION (HCC): ICD-10-CM

## 2023-04-19 RX ORDER — CALCIUM CITRATE/VITAMIN D3 200MG-6.25
TABLET ORAL
Qty: 50 EACH | Refills: 2 | Status: SHIPPED | OUTPATIENT
Start: 2023-04-19

## 2023-04-20 ENCOUNTER — TELEPHONE (OUTPATIENT)
Dept: NEUROSURGERY | Age: 73
End: 2023-04-20

## 2023-04-20 DIAGNOSIS — R39.12 WEAK URINARY STREAM: ICD-10-CM

## 2023-04-20 DIAGNOSIS — R35.1 NOCTURIA: ICD-10-CM

## 2023-04-20 DIAGNOSIS — N40.1 HYPERTROPHY OF PROSTATE WITH URINARY OBSTRUCTION: ICD-10-CM

## 2023-04-20 DIAGNOSIS — N13.8 HYPERTROPHY OF PROSTATE WITH URINARY OBSTRUCTION: ICD-10-CM

## 2023-04-20 DIAGNOSIS — R35.0 FREQUENCY OF URINATION: ICD-10-CM

## 2023-04-20 NOTE — TELEPHONE ENCOUNTER
Attempted to contact patient in regards to upcoming appointment with Dr. Swapnil Gipson. Left voicemail message to patient stating that an active order for a MRA Head is needed prior to appointment. Writer left details of how to schedule imaging and a call back number for the office.

## 2023-04-24 LAB
AVERAGE GLUCOSE: 154
HBA1C MFR BLD: 7 %

## 2023-04-25 DIAGNOSIS — E11.9 TYPE 2 DIABETES MELLITUS WITHOUT COMPLICATION, WITHOUT LONG-TERM CURRENT USE OF INSULIN (HCC): ICD-10-CM

## 2023-04-26 RX ORDER — TAMSULOSIN HYDROCHLORIDE 0.4 MG/1
CAPSULE ORAL
Qty: 90 CAPSULE | Refills: 0 | Status: SHIPPED | OUTPATIENT
Start: 2023-04-26

## 2023-05-15 ENCOUNTER — HOSPITAL ENCOUNTER (OUTPATIENT)
Dept: MRI IMAGING | Age: 73
Discharge: HOME | End: 2023-05-17
Payer: COMMERCIAL

## 2023-05-15 DIAGNOSIS — I72.9 ANEURYSM (HCC): ICD-10-CM

## 2023-05-15 LAB
EGFR, POC: >60 ML/MIN/1.73M2
POC BUN: 15 MG/DL (ref 8–26)
POC CREATININE: 0.59 MG/DL (ref 0.51–1.19)

## 2023-05-15 PROCEDURE — 82565 ASSAY OF CREATININE: CPT

## 2023-05-15 PROCEDURE — A9576 INJ PROHANCE MULTIPACK: HCPCS | Performed by: STUDENT IN AN ORGANIZED HEALTH CARE EDUCATION/TRAINING PROGRAM

## 2023-05-15 PROCEDURE — 2580000003 HC RX 258: Performed by: STUDENT IN AN ORGANIZED HEALTH CARE EDUCATION/TRAINING PROGRAM

## 2023-05-15 PROCEDURE — 84520 ASSAY OF UREA NITROGEN: CPT

## 2023-05-15 PROCEDURE — 6360000004 HC RX CONTRAST MEDICATION: Performed by: STUDENT IN AN ORGANIZED HEALTH CARE EDUCATION/TRAINING PROGRAM

## 2023-05-15 PROCEDURE — 70546 MR ANGIOGRAPH HEAD W/O&W/DYE: CPT

## 2023-05-15 RX ORDER — SODIUM CHLORIDE 0.9 % (FLUSH) 0.9 %
10 SYRINGE (ML) INJECTION PRN
Status: COMPLETED | OUTPATIENT
Start: 2023-05-15 | End: 2023-05-15

## 2023-05-15 RX ORDER — CLOPIDOGREL BISULFATE 75 MG/1
TABLET ORAL
Qty: 30 TABLET | Refills: 0 | Status: SHIPPED | OUTPATIENT
Start: 2023-05-15

## 2023-05-15 RX ADMIN — SODIUM CHLORIDE, PRESERVATIVE FREE 10 ML: 5 INJECTION INTRAVENOUS at 07:40

## 2023-05-15 RX ADMIN — GADOTERIDOL 16 ML: 279.3 INJECTION, SOLUTION INTRAVENOUS at 07:40

## 2023-05-17 ENCOUNTER — OFFICE VISIT (OUTPATIENT)
Dept: NEUROLOGY | Age: 73
End: 2023-05-17
Payer: COMMERCIAL

## 2023-05-17 VITALS
WEIGHT: 180.4 LBS | DIASTOLIC BLOOD PRESSURE: 76 MMHG | TEMPERATURE: 97.3 F | OXYGEN SATURATION: 94 % | BODY MASS INDEX: 28.99 KG/M2 | HEIGHT: 66 IN | HEART RATE: 74 BPM | SYSTOLIC BLOOD PRESSURE: 155 MMHG

## 2023-05-17 DIAGNOSIS — R42 LIGHTHEADEDNESS: ICD-10-CM

## 2023-05-17 DIAGNOSIS — I67.1 RIGHT INTERNAL CAROTID ARTERY ANEURYSM: Primary | ICD-10-CM

## 2023-05-17 PROCEDURE — 3077F SYST BP >= 140 MM HG: CPT | Performed by: PSYCHIATRY & NEUROLOGY

## 2023-05-17 PROCEDURE — 99215 OFFICE O/P EST HI 40 MIN: CPT | Performed by: PSYCHIATRY & NEUROLOGY

## 2023-05-17 PROCEDURE — 1123F ACP DISCUSS/DSCN MKR DOCD: CPT | Performed by: PSYCHIATRY & NEUROLOGY

## 2023-05-17 PROCEDURE — 3078F DIAST BP <80 MM HG: CPT | Performed by: PSYCHIATRY & NEUROLOGY

## 2023-05-17 ASSESSMENT — ENCOUNTER SYMPTOMS
VOMITING: 0
SHORTNESS OF BREATH: 0
COUGH: 0
NAUSEA: 0
VOICE CHANGE: 0
PHOTOPHOBIA: 0
COLOR CHANGE: 0

## 2023-05-17 NOTE — PROGRESS NOTES
Endovascular Neurosurgery - [address], [address] [facility], [address] [place] [address]  P: [phone]  F: [phone]      Dear Dr. Archana Zurita    HPI:     VARUN Oneal is a 68 y.o. 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 Doing well on aspirin and clopidogrel. Manageable bruising noted. No neuro deficits. No new headaches    Allergies   Allergen Reactions    Cialis [Tadalafil]      Leg pain/cramps    Ranitidine      Leg spasms     Current Outpatient Medications   Medication Sig Dispense Refill    clopidogrel (PLAVIX) 75 MG tablet TAKE 1 TABLET BY MOUTH ONCE DAILY START  TAKING  PLAVIX  75MG  FROM  1/17/2023 30 tablet 0    tamsulosin (FLOMAX) 0.4 MG capsule Take 1 capsule by mouth once daily 90 capsule 0    TRUE METRIX BLOOD GLUCOSE TEST strip 1 ONCE DAILY USE AS DIRECTED 50 each 2    amLODIPine-benazepril (LOTREL) 5-10 MG per capsule Take 1 capsule by mouth once daily 30 capsule 4    simvastatin (ZOCOR) 80 MG tablet Take 1 tablet by mouth nightly 90 tablet 1    aspirin 81 MG chewable tablet Take 1 tablet by mouth daily 30 tablet 3    Lancets MISC USE 1  TO CHECK GLUCOSE ONCE DAILY 100 each 1    metFORMIN (GLUCOPHAGE) 500 MG tablet Take 1 tablet by mouth once daily with breakfast 90 tablet 2    blood glucose monitor strips Test one times a day & as needed for symptoms of irregular blood glucose. Type 2 DM not on insulin 50 strip 3    TRUEplus Lancets 30G MISC USE AS DIRECTED ONCE DAILY 100 each 3    Multiple Vitamin (MULTI VITAMIN DAILY) TABS Take 1 tablet by mouth daily       No current facility-administered medications for this visit.      Past Medical History:   Diagnosis Date    Aneurysm ([address]     rt superior hypophyseal    Diabetes mellitus ([address]     H/O hepatitis     Hyperlipidemia     Hypertension     Sinus infection     Wellness examination     PCP Dr Brooklyn Moss; last visit 11/14/22      Past Surgical

## 2023-05-31 DIAGNOSIS — Z12.5 PROSTATE CANCER SCREENING: ICD-10-CM

## 2023-05-31 DIAGNOSIS — R39.12 WEAK URINARY STREAM: Primary | ICD-10-CM

## 2023-06-19 ENCOUNTER — OFFICE VISIT (OUTPATIENT)
Dept: UROLOGY | Age: 73
End: 2023-06-19
Payer: COMMERCIAL

## 2023-06-19 VITALS
HEART RATE: 70 BPM | DIASTOLIC BLOOD PRESSURE: 72 MMHG | HEIGHT: 66 IN | WEIGHT: 180 LBS | BODY MASS INDEX: 28.93 KG/M2 | SYSTOLIC BLOOD PRESSURE: 126 MMHG | TEMPERATURE: 98 F

## 2023-06-19 DIAGNOSIS — Z12.5 PROSTATE CANCER SCREENING: Primary | ICD-10-CM

## 2023-06-19 DIAGNOSIS — N40.1 HYPERTROPHY OF PROSTATE WITH URINARY OBSTRUCTION: ICD-10-CM

## 2023-06-19 DIAGNOSIS — R35.0 FREQUENCY OF URINATION: ICD-10-CM

## 2023-06-19 DIAGNOSIS — N13.8 HYPERTROPHY OF PROSTATE WITH URINARY OBSTRUCTION: ICD-10-CM

## 2023-06-19 DIAGNOSIS — R35.1 NOCTURIA: ICD-10-CM

## 2023-06-19 DIAGNOSIS — R39.12 WEAK URINARY STREAM: ICD-10-CM

## 2023-06-19 PROCEDURE — 99213 OFFICE O/P EST LOW 20 MIN: CPT | Performed by: UROLOGY

## 2023-06-19 PROCEDURE — 3078F DIAST BP <80 MM HG: CPT | Performed by: UROLOGY

## 2023-06-19 PROCEDURE — 3074F SYST BP LT 130 MM HG: CPT | Performed by: UROLOGY

## 2023-06-19 PROCEDURE — 1123F ACP DISCUSS/DSCN MKR DOCD: CPT | Performed by: UROLOGY

## 2023-06-19 RX ORDER — TAMSULOSIN HYDROCHLORIDE 0.4 MG/1
0.4 CAPSULE ORAL DAILY
Qty: 90 CAPSULE | Refills: 3 | Status: SHIPPED | OUTPATIENT
Start: 2023-06-19

## 2023-06-19 ASSESSMENT — ENCOUNTER SYMPTOMS
VOMITING: 0
SHORTNESS OF BREATH: 0
EYE REDNESS: 0
COUGH: 0
GASTROINTESTINAL NEGATIVE: 1
EYES NEGATIVE: 1
CONSTIPATION: 0
NAUSEA: 0
EYE PAIN: 0
RESPIRATORY NEGATIVE: 1
ABDOMINAL PAIN: 0
BACK PAIN: 0
DIARRHEA: 0
WHEEZING: 0

## 2023-06-19 NOTE — PROGRESS NOTES
Review of Systems   Constitutional: Negative. Negative for appetite change, chills and fatigue. Eyes: Negative. Negative for pain, redness and visual disturbance. Respiratory: Negative. Negative for cough, shortness of breath and wheezing. Cardiovascular: Negative. Negative for chest pain and leg swelling. Gastrointestinal: Negative. Negative for abdominal pain, constipation, diarrhea, nausea and vomiting. Genitourinary: Negative. Negative for difficulty urinating, dysuria, flank pain, frequency, hematuria and urgency. Musculoskeletal: Negative. Negative for back pain, joint swelling and myalgias. Skin: Negative. Negative for rash and wound. Neurological: Negative. Negative for dizziness, weakness and numbness. Hematological: Negative. Does not bruise/bleed easily.
29.05 kg/m². Patient is a 68 y.o. male in no acute distress and alert and oriented to person, place and time. Physical Exam  Constitutional: Patient in no acute distress. Neuro: Alert and oriented to person, place and time. Psych: Mood normal, affect normal  Skin: No rash noted  HEENT: Head: Normocephalic andatraumatic      Assessment and Plan      1. Prostate cancer screening    2. Hypertrophy of prostate with urinary obstruction    3. Weak urinary stream    4. Frequency of urination    5. Nocturia           Plan:   Cont flomax  F/u one year psa      Return in about 1 year (around 6/19/2024) for psa. Prescriptions Ordered:  Orders Placed This Encounter   Medications    tamsulosin (FLOMAX) 0.4 MG capsule     Sig: Take 1 capsule by mouth daily     Dispense:  90 capsule     Refill:  3     Orders Placed:  Orders Placed This Encounter   Procedures    PSA, Diagnostic     Standing Status:   Future     Standing Expiration Date:   6/18/2024           Keren Pina MD    Agree with the ROS entered by the MA.
Yes

## 2023-07-09 DIAGNOSIS — E11.21 TYPE 2 DIABETES WITH NEPHROPATHY (HCC): ICD-10-CM

## 2023-07-09 ASSESSMENT — ENCOUNTER SYMPTOMS
SHORTNESS OF BREATH: 0
VOMITING: 0
NAUSEA: 0
VOICE CHANGE: 0
PHOTOPHOBIA: 0
COLOR CHANGE: 0
COUGH: 0

## 2023-07-10 ENCOUNTER — HOSPITAL ENCOUNTER (OUTPATIENT)
Dept: INTERVENTIONAL RADIOLOGY/VASCULAR | Age: 73
Discharge: HOME | End: 2023-07-12
Payer: COMMERCIAL

## 2023-07-10 VITALS
SYSTOLIC BLOOD PRESSURE: 157 MMHG | OXYGEN SATURATION: 96 % | DIASTOLIC BLOOD PRESSURE: 70 MMHG | RESPIRATION RATE: 13 BRPM | HEART RATE: 72 BPM

## 2023-07-10 DIAGNOSIS — I67.1 CEREBRAL ANEURYSM, NONRUPTURED: ICD-10-CM

## 2023-07-10 DIAGNOSIS — I10 ESSENTIAL HYPERTENSION: ICD-10-CM

## 2023-07-10 LAB
BUN BLD-MCNC: 19 MG/DL (ref 8–26)
CHLORIDE BLD-SCNC: 107 MMOL/L (ref 98–107)
EGFR, POC: >60 ML/MIN/1.73M2
GLUCOSE BLD-MCNC: 134 MG/DL (ref 74–100)
HCT VFR BLD AUTO: 49 % (ref 41–53)
POC CREATININE: 0.54 MG/DL (ref 0.51–1.19)
POC HEMOGLOBIN: 16.8 G/DL (ref 13.5–17.5)
POTASSIUM BLD-SCNC: 4.2 MMOL/L (ref 3.5–4.5)
SODIUM BLD-SCNC: 142 MMOL/L (ref 138–146)

## 2023-07-10 PROCEDURE — C1760 CLOSURE DEV, VASC: HCPCS

## 2023-07-10 PROCEDURE — 84132 ASSAY OF SERUM POTASSIUM: CPT

## 2023-07-10 PROCEDURE — 99153 MOD SED SAME PHYS/QHP EA: CPT

## 2023-07-10 PROCEDURE — 6370000000 HC RX 637 (ALT 250 FOR IP): Performed by: PSYCHIATRY & NEUROLOGY

## 2023-07-10 PROCEDURE — 75710 ARTERY X-RAYS ARM/LEG: CPT

## 2023-07-10 PROCEDURE — 6370000000 HC RX 637 (ALT 250 FOR IP)

## 2023-07-10 PROCEDURE — C1894 INTRO/SHEATH, NON-LASER: HCPCS

## 2023-07-10 PROCEDURE — 2709999900 HC NON-CHARGEABLE SUPPLY

## 2023-07-10 PROCEDURE — 36224 PLACE CATH CAROTD ART: CPT

## 2023-07-10 PROCEDURE — 82435 ASSAY OF BLOOD CHLORIDE: CPT

## 2023-07-10 PROCEDURE — C1887 CATHETER, GUIDING: HCPCS

## 2023-07-10 PROCEDURE — 82947 ASSAY GLUCOSE BLOOD QUANT: CPT

## 2023-07-10 PROCEDURE — 7100000011 HC PHASE II RECOVERY - ADDTL 15 MIN

## 2023-07-10 PROCEDURE — 99152 MOD SED SAME PHYS/QHP 5/>YRS: CPT

## 2023-07-10 PROCEDURE — 2580000003 HC RX 258: Performed by: PSYCHIATRY & NEUROLOGY

## 2023-07-10 PROCEDURE — 84520 ASSAY OF UREA NITROGEN: CPT

## 2023-07-10 PROCEDURE — 84295 ASSAY OF SERUM SODIUM: CPT

## 2023-07-10 PROCEDURE — 7100000010 HC PHASE II RECOVERY - FIRST 15 MIN

## 2023-07-10 PROCEDURE — C1769 GUIDE WIRE: HCPCS

## 2023-07-10 PROCEDURE — 82565 ASSAY OF CREATININE: CPT

## 2023-07-10 PROCEDURE — 85014 HEMATOCRIT: CPT

## 2023-07-10 RX ORDER — ONDANSETRON 2 MG/ML
4 INJECTION INTRAMUSCULAR; INTRAVENOUS EVERY 6 HOURS PRN
Status: DISCONTINUED | OUTPATIENT
Start: 2023-07-10 | End: 2023-07-13 | Stop reason: HOSPADM

## 2023-07-10 RX ORDER — ONDANSETRON 4 MG/1
4 TABLET, ORALLY DISINTEGRATING ORAL EVERY 8 HOURS PRN
Status: DISCONTINUED | OUTPATIENT
Start: 2023-07-10 | End: 2023-07-13 | Stop reason: HOSPADM

## 2023-07-10 RX ORDER — SODIUM CHLORIDE 9 MG/ML
INJECTION, SOLUTION INTRAVENOUS CONTINUOUS
Status: DISCONTINUED | OUTPATIENT
Start: 2023-07-10 | End: 2023-07-13 | Stop reason: HOSPADM

## 2023-07-10 RX ORDER — ACETAMINOPHEN 325 MG/1
650 TABLET ORAL EVERY 4 HOURS PRN
Status: DISCONTINUED | OUTPATIENT
Start: 2023-07-10 | End: 2023-07-13 | Stop reason: HOSPADM

## 2023-07-10 RX ORDER — SODIUM CHLORIDE 0.9 % (FLUSH) 0.9 %
5-40 SYRINGE (ML) INJECTION EVERY 12 HOURS SCHEDULED
Status: DISCONTINUED | OUTPATIENT
Start: 2023-07-10 | End: 2023-07-13 | Stop reason: HOSPADM

## 2023-07-10 RX ORDER — IODIXANOL 270 MG/ML
100 INJECTION, SOLUTION INTRAVASCULAR
Status: DISCONTINUED | OUTPATIENT
Start: 2023-07-10 | End: 2023-07-13 | Stop reason: HOSPADM

## 2023-07-10 RX ORDER — GLUCOSAM/CHON-MSM1/C/MANG/BOSW 500-416.6
TABLET ORAL
Qty: 100 EACH | Refills: 2 | Status: SHIPPED | OUTPATIENT
Start: 2023-07-10

## 2023-07-10 RX ORDER — SODIUM CHLORIDE 0.9 % (FLUSH) 0.9 %
5-40 SYRINGE (ML) INJECTION PRN
Status: DISCONTINUED | OUTPATIENT
Start: 2023-07-10 | End: 2023-07-13 | Stop reason: HOSPADM

## 2023-07-10 RX ORDER — SODIUM CHLORIDE 9 MG/ML
INJECTION, SOLUTION INTRAVENOUS PRN
Status: DISCONTINUED | OUTPATIENT
Start: 2023-07-10 | End: 2023-07-13 | Stop reason: HOSPADM

## 2023-07-10 RX ADMIN — ACETAMINOPHEN 650 MG: 325 TABLET ORAL at 14:11

## 2023-07-10 RX ADMIN — SODIUM CHLORIDE: 9 INJECTION, SOLUTION INTRAVENOUS at 14:11

## 2023-07-10 ASSESSMENT — ENCOUNTER SYMPTOMS
NAUSEA: 0
VOMITING: 0
COUGH: 0
PHOTOPHOBIA: 0
VOICE CHANGE: 0
COLOR CHANGE: 0
SHORTNESS OF BREATH: 0

## 2023-07-10 ASSESSMENT — PAIN DESCRIPTION - LOCATION: LOCATION: HEAD

## 2023-07-10 ASSESSMENT — PAIN DESCRIPTION - DESCRIPTORS: DESCRIPTORS: ACHING

## 2023-07-10 ASSESSMENT — PAIN SCALES - GENERAL: PAINLEVEL_OUTOF10: 3

## 2023-07-10 NOTE — PROGRESS NOTES
Ambulated to bathroom and in halls. Gait steady. . Right radial puncture site and right radial pulse assessment unchanged.

## 2023-07-10 NOTE — H&P
Endovascular Neurosurgery - [facility]  [address], [address]  P: [phone]  F: [phone]          HPI:     VARUN Zurita is a 68 y.o. 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 Doing well on aspirin. Not on Plavix anymore. No neuro deficits. No new headaches. Plan for diagnostic cerebral angiogram.     Allergies   Allergen Reactions    Cialis [Tadalafil]      Leg pain/cramps    Ranitidine      Leg spasms     Current Outpatient Medications   Medication Sig Dispense Refill    clopidogrel (PLAVIX) 75 MG tablet TAKE 1 TABLET BY MOUTH ONCE DAILY START  TAKING  PLAVIX  75MG  FROM  1/17/2023 30 tablet 0    tamsulosin (FLOMAX) 0.4 MG capsule Take 1 capsule by mouth once daily 90 capsule 0    TRUE METRIX BLOOD GLUCOSE TEST strip 1 ONCE DAILY USE AS DIRECTED 50 each 2    amLODIPine-benazepril (LOTREL) 5-10 MG per capsule Take 1 capsule by mouth once daily 30 capsule 4    simvastatin (ZOCOR) 80 MG tablet Take 1 tablet by mouth nightly 90 tablet 1    aspirin 81 MG chewable tablet Take 1 tablet by mouth daily 30 tablet 3    Lancets MISC USE 1  TO CHECK GLUCOSE ONCE DAILY 100 each 1    metFORMIN (GLUCOPHAGE) 500 MG tablet Take 1 tablet by mouth once daily with breakfast 90 tablet 2    blood glucose monitor strips Test one times a day & as needed for symptoms of irregular blood glucose. Type 2 DM not on insulin 50 strip 3    TRUEplus Lancets 30G MISC USE AS DIRECTED ONCE DAILY 100 each 3    Multiple Vitamin (MULTI VITAMIN DAILY) TABS Take 1 tablet by mouth daily       No current facility-administered medications for this visit.      Past Medical History:   Diagnosis Date    Aneurysm ([address])     rt superior hypophyseal    Diabetes mellitus ([address])     H/O hepatitis     Hyperlipidemia     Hypertension     Sinus infection     Wellness examination     PCP Dr Sanchez Ibanez; last visit 11/14/22

## 2023-07-10 NOTE — BRIEF OP NOTE
discharge.         Saurabh Alvarez MD  Endovascular Neurosurgery Fellow     Stroke, [address] [facility] Stroke [address] [facility]      Lynn Mejia MD   Pager [phone]  Stroke, [address] [facility] Stroke Network  [facility]  Electronically signed 7/10/2023 at 12:12 PM

## 2023-07-10 NOTE — PROGRESS NOTES
Received post cerebral procedure to [facility] room 12. Assessment obtained. Restrictions reviewed with patient. Post procedure pathway initiated. Rt. radial site soft (radial compression device intact), No hematoma noted. Family at side. Patient without complaints.

## 2023-07-10 NOTE — DISCHARGE INSTRUCTIONS
decreased. You will be able to swallow and breathe on your own. During sedation/analgesia your blood pressure, heart and breathing will be watched closely. After the procedure, you may not remember what was said or done. You may have the following effects from the medication. \" Drowsiness, dizziness, sleepiness or confusion. \" Difficulty remembering or delayed reaction times. \" Loss of fine muscle control or difficulty with your balance especially while walking. \" Difficulty focusing or blurred vision. You may not be aware of slight changes in your behavior and/or your reaction time because of the medication used during the procedure. Therefore you should follow these instructions. \" Have someone responsible help you with your care. \" Do not drive for 24 hours. \" Do not operate equipment for 24 hours (lawnmowers, power tools, kitchen accessories, stove). \" Do not drink any alcoholic beverages for a minimum of 24 hours. \" Do not make important personal, legal or business decisions for 24 hours. \" You may experience dizziness or lightheadedness. Move slowly and carefully, do not make sudden position changes. \" Drink extra amounts of fluids today. \" Increase your diet as tolerated (unless you have received specific instructions from your doctor). \" If you feel nauseated, continue with liquids until the nausea is gone. \" Notify your physician if you have not urinated within 8 hours after the procedure. \" Resume your medications unless otherwise instructed. Coronary artery disease (CAD) occurs when plaque builds up in the arteries that bring oxygen-rich blood to your heart. Plaque is a fatty substance made of cholesterol, calcium, and other substances in the blood. This process is called hardening of the arteries, or atherosclerosis. What happens when you have coronary artery disease? Plaque may narrow the coronary arteries. Narrowed arteries cause poor blood flow.  This can lead to angina

## 2023-07-10 NOTE — TELEPHONE ENCOUNTER
LAST VISIT:   4/10/2023     Future Appointments   Date Time Provider [address]   7/10/2023  9:00 AM STV BI-PLANE RM 1 [facility] Radiolog   8/17/2023  2:20 PM MD ALANA Braun

## 2023-07-11 RX ORDER — AMLODIPINE BESYLATE AND BENAZEPRIL HYDROCHLORIDE 5; 10 MG/1; MG/1
1 CAPSULE ORAL DAILY
Qty: 30 CAPSULE | Refills: 4 | Status: SHIPPED | OUTPATIENT
Start: 2023-07-11

## 2023-07-18 DIAGNOSIS — E78.49 OTHER HYPERLIPIDEMIA: ICD-10-CM

## 2023-07-18 DIAGNOSIS — E11.9 TYPE 2 DIABETES MELLITUS WITHOUT COMPLICATION, WITHOUT LONG-TERM CURRENT USE OF INSULIN (HCC): ICD-10-CM

## 2023-07-19 RX ORDER — SIMVASTATIN 80 MG
80 TABLET ORAL NIGHTLY
Qty: 90 TABLET | Refills: 2 | Status: SHIPPED | OUTPATIENT
Start: 2023-07-19

## 2023-08-04 ENCOUNTER — OFFICE VISIT (OUTPATIENT)
Dept: NEUROLOGY | Age: 73
End: 2023-08-04

## 2023-08-04 VITALS
DIASTOLIC BLOOD PRESSURE: 76 MMHG | WEIGHT: 180 LBS | BODY MASS INDEX: 28.93 KG/M2 | HEIGHT: 66 IN | HEART RATE: 80 BPM | OXYGEN SATURATION: 95 % | SYSTOLIC BLOOD PRESSURE: 153 MMHG

## 2023-08-04 DIAGNOSIS — I72.9 ANEURYSM (HCC): ICD-10-CM

## 2023-08-04 DIAGNOSIS — I67.1 RIGHT INTERNAL CAROTID ARTERY ANEURYSM: Primary | ICD-10-CM

## 2023-08-04 DIAGNOSIS — I67.1 CEREBRAL ANEURYSM: ICD-10-CM

## 2023-08-04 DIAGNOSIS — R42 LIGHTHEADEDNESS: ICD-10-CM

## 2023-08-04 ASSESSMENT — ENCOUNTER SYMPTOMS
VOICE CHANGE: 0
NAUSEA: 0
VOMITING: 0
COUGH: 0
COLOR CHANGE: 0
SHORTNESS OF BREATH: 0
PHOTOPHOBIA: 0

## 2023-08-04 NOTE — PROGRESS NOTES
Endovascular Neurosurgery - [facility]  [address], [address]  P: [phone]  F: [phone]          HPI:     VARUN Long is a 68 y.o. 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 Doing well on aspirin. Not on Plavix anymore. No neuro deficits. No new headaches. Underwent diagnostic cerebral angiogram on July 10 with uneventful course. He is doing well and denies any new neurological symptoms    Allergies   Allergen Reactions    Cialis [Tadalafil]      Leg pain/cramps    Ranitidine      Leg spasms     Current Outpatient Medications   Medication Sig Dispense Refill    simvastatin (ZOCOR) 80 MG tablet Take 1 tablet by mouth nightly 90 tablet 2    amLODIPine-benazepril (LOTREL) 5-10 MG per capsule Take 1 capsule by mouth daily 30 capsule 4    TRUEplus Lancets 28G MISC USE 1 LANCET TO CHECK GLUCOSE ONCE DAILY 100 each 2    tamsulosin (FLOMAX) 0.4 MG capsule Take 1 capsule by mouth daily 90 capsule 3    clopidogrel (PLAVIX) 75 MG tablet TAKE 1 TABLET BY MOUTH ONCE DAILY START  TAKING  PLAVIX  75MG  FROM  1/17/2023 30 tablet 0    TRUE METRIX BLOOD GLUCOSE TEST strip 1 ONCE DAILY USE AS DIRECTED 50 each 2    aspirin 81 MG chewable tablet Take 1 tablet by mouth daily 30 tablet 3    metFORMIN (GLUCOPHAGE) 500 MG tablet Take 1 tablet by mouth once daily with breakfast 90 tablet 2    blood glucose monitor strips Test one times a day & as needed for symptoms of irregular blood glucose. Type 2 DM not on insulin 50 strip 3    TRUEplus Lancets 30G MISC USE AS DIRECTED ONCE DAILY 100 each 3    Multiple Vitamin (MULTI VITAMIN DAILY) TABS Take 1 tablet by mouth daily       No current facility-administered medications for this visit.      Past Medical History:   Diagnosis Date    Aneurysm ([address])     rt superior hypophyseal    Diabetes mellitus ([address])     H/O hepatitis     Hyperlipidemia

## 2023-08-04 NOTE — PATIENT INSTRUCTIONS
Dear Mr  Anthony Stands taking baby aspirin     Follow up with Dr Rigo Blake in 3 months     Sonia Thompson MD  Endovascular Neurosurgery Fellow     Stroke, [address] [facility] Stroke Network  [facility]

## 2023-08-17 ENCOUNTER — OFFICE VISIT (OUTPATIENT)
Dept: PRIMARY CARE CLINIC | Age: 73
End: 2023-08-17
Payer: COMMERCIAL

## 2023-08-17 VITALS
DIASTOLIC BLOOD PRESSURE: 62 MMHG | HEART RATE: 98 BPM | HEIGHT: 66 IN | SYSTOLIC BLOOD PRESSURE: 136 MMHG | OXYGEN SATURATION: 94 % | BODY MASS INDEX: 28.96 KG/M2 | WEIGHT: 180.2 LBS

## 2023-08-17 DIAGNOSIS — Z00.00 MEDICARE ANNUAL WELLNESS VISIT, SUBSEQUENT: Primary | ICD-10-CM

## 2023-08-17 DIAGNOSIS — E11.9 TYPE 2 DIABETES MELLITUS WITHOUT COMPLICATION, WITHOUT LONG-TERM CURRENT USE OF INSULIN (HCC): ICD-10-CM

## 2023-08-17 LAB — HBA1C MFR BLD: 7.2 %

## 2023-08-17 PROCEDURE — 3075F SYST BP GE 130 - 139MM HG: CPT | Performed by: FAMILY MEDICINE

## 2023-08-17 PROCEDURE — 3051F HG A1C>EQUAL 7.0%<8.0%: CPT | Performed by: FAMILY MEDICINE

## 2023-08-17 PROCEDURE — 3078F DIAST BP <80 MM HG: CPT | Performed by: FAMILY MEDICINE

## 2023-08-17 PROCEDURE — 83037 HB GLYCOSYLATED A1C HOME DEV: CPT | Performed by: FAMILY MEDICINE

## 2023-08-17 PROCEDURE — G0439 PPPS, SUBSEQ VISIT: HCPCS | Performed by: FAMILY MEDICINE

## 2023-08-17 PROCEDURE — 1123F ACP DISCUSS/DSCN MKR DOCD: CPT | Performed by: FAMILY MEDICINE

## 2023-08-17 ASSESSMENT — LIFESTYLE VARIABLES
HOW MANY STANDARD DRINKS CONTAINING ALCOHOL DO YOU HAVE ON A TYPICAL DAY: PATIENT DOES NOT DRINK
HOW OFTEN DO YOU HAVE A DRINK CONTAINING ALCOHOL: NEVER

## 2023-08-17 ASSESSMENT — PATIENT HEALTH QUESTIONNAIRE - PHQ9
1. LITTLE INTEREST OR PLEASURE IN DOING THINGS: 0
2. FEELING DOWN, DEPRESSED OR HOPELESS: 0
SUM OF ALL RESPONSES TO PHQ QUESTIONS 1-9: 0
SUM OF ALL RESPONSES TO PHQ QUESTIONS 1-9: 0
SUM OF ALL RESPONSES TO PHQ9 QUESTIONS 1 & 2: 0
SUM OF ALL RESPONSES TO PHQ QUESTIONS 1-9: 0
SUM OF ALL RESPONSES TO PHQ QUESTIONS 1-9: 0

## 2023-08-18 DIAGNOSIS — E11.9 TYPE 2 DIABETES MELLITUS WITHOUT COMPLICATION (HCC): ICD-10-CM

## 2023-08-18 RX ORDER — CALCIUM CITRATE/VITAMIN D3 200MG-6.25
TABLET ORAL
Qty: 50 EACH | Refills: 4 | Status: SHIPPED | OUTPATIENT
Start: 2023-08-18

## 2023-08-22 DIAGNOSIS — E11.9 TYPE 2 DIABETES MELLITUS WITHOUT COMPLICATION, UNSPECIFIED WHETHER LONG TERM INSULIN USE (HCC): ICD-10-CM

## 2023-08-22 NOTE — TELEPHONE ENCOUNTER
LAST VISIT:   8/17/2023     Future Appointments   Date Time Provider [address]   12/20/2023  9:30 AM Chris English MD Neuro Endo [facility]   12/22/2023  3:20 PM Kelly Wyatt MD [facility] Gislon Olvera

## 2024-01-06 LAB
ALBUMIN SERPL-MCNC: NORMAL G/DL
ALP BLD-CCNC: NORMAL U/L
ALT SERPL-CCNC: NORMAL U/L
AST SERPL-CCNC: NORMAL U/L
BILIRUB SERPL-MCNC: NORMAL MG/DL
BUN BLDV-MCNC: NORMAL MG/DL
CALCIUM SERPL-MCNC: NORMAL MG/DL
CHLORIDE BLD-SCNC: NORMAL MMOL/L
CHOLESTEROL, TOTAL: 142 MG/DL
CHOLESTEROL/HDL RATIO: 3.1
CO2: NORMAL
CREAT SERPL-MCNC: NORMAL MG/DL
GLUCOSE FASTING: 135 MG/DL
HDLC SERPL-MCNC: 46 MG/DL (ref 35–70)
LDL CHOLESTEROL CALCULATED: 72 MG/DL (ref 0–160)
NONHDLC SERPL-MCNC: NORMAL MG/DL
POTASSIUM SERPL-SCNC: NORMAL MMOL/L
SODIUM BLD-SCNC: NORMAL MMOL/L
TOTAL PROTEIN: NORMAL
TRIGL SERPL-MCNC: 119 MG/DL
VITAMIN B-12: NORMAL
VLDLC SERPL CALC-MCNC: 24 MG/DL

## 2024-01-16 DIAGNOSIS — R20.2 TINGLING SENSATION: Chronic | ICD-10-CM

## 2024-01-16 DIAGNOSIS — I10 ESSENTIAL HYPERTENSION: ICD-10-CM

## 2024-01-16 DIAGNOSIS — E78.49 OTHER HYPERLIPIDEMIA: ICD-10-CM

## 2024-01-16 DIAGNOSIS — E11.9 TYPE 2 DIABETES MELLITUS WITHOUT COMPLICATION, UNSPECIFIED WHETHER LONG TERM INSULIN USE (HCC): ICD-10-CM

## 2024-01-17 DIAGNOSIS — I10 ESSENTIAL HYPERTENSION: ICD-10-CM

## 2024-01-17 RX ORDER — AMLODIPINE BESYLATE AND BENAZEPRIL HYDROCHLORIDE 5; 10 MG/1; MG/1
1 CAPSULE ORAL DAILY
Qty: 90 CAPSULE | Refills: 1 | Status: SHIPPED | OUTPATIENT
Start: 2024-01-17

## 2024-02-19 DIAGNOSIS — M17.10 ARTHRITIS OF KNEE: ICD-10-CM

## 2024-02-20 RX ORDER — NAPROXEN 500 MG/1
500 TABLET ORAL 2 TIMES DAILY PRN
Qty: 60 TABLET | Refills: 0 | Status: SHIPPED | OUTPATIENT
Start: 2024-02-20

## 2024-02-26 DIAGNOSIS — E11.9 TYPE 2 DIABETES MELLITUS WITHOUT COMPLICATION, UNSPECIFIED WHETHER LONG TERM INSULIN USE (HCC): ICD-10-CM

## 2024-03-29 DIAGNOSIS — E11.9 TYPE 2 DIABETES MELLITUS WITHOUT COMPLICATION (HCC): ICD-10-CM

## 2024-03-29 RX ORDER — CALCIUM CITRATE/VITAMIN D3 200MG-6.25
TABLET ORAL
Qty: 50 EACH | Refills: 3 | Status: SHIPPED | OUTPATIENT
Start: 2024-03-29

## 2024-04-16 DIAGNOSIS — E11.9 TYPE 2 DIABETES MELLITUS WITHOUT COMPLICATION, WITHOUT LONG-TERM CURRENT USE OF INSULIN (HCC): ICD-10-CM

## 2024-04-16 DIAGNOSIS — E78.49 OTHER HYPERLIPIDEMIA: ICD-10-CM

## 2024-04-16 RX ORDER — SIMVASTATIN 80 MG
80 TABLET ORAL NIGHTLY
Qty: 90 TABLET | Refills: 0 | Status: SHIPPED | OUTPATIENT
Start: 2024-04-16

## 2024-04-24 ENCOUNTER — OFFICE VISIT (OUTPATIENT)
Dept: PRIMARY CARE CLINIC | Age: 74
End: 2024-04-24
Payer: COMMERCIAL

## 2024-04-24 VITALS
HEART RATE: 90 BPM | WEIGHT: 177 LBS | SYSTOLIC BLOOD PRESSURE: 110 MMHG | OXYGEN SATURATION: 93 % | HEIGHT: 66 IN | DIASTOLIC BLOOD PRESSURE: 60 MMHG | BODY MASS INDEX: 28.45 KG/M2

## 2024-04-24 DIAGNOSIS — I10 ESSENTIAL HYPERTENSION: ICD-10-CM

## 2024-04-24 DIAGNOSIS — E11.9 TYPE 2 DIABETES MELLITUS WITHOUT COMPLICATION, WITHOUT LONG-TERM CURRENT USE OF INSULIN (HCC): Primary | ICD-10-CM

## 2024-04-24 LAB — HBA1C MFR BLD: 6.9 %

## 2024-04-24 PROCEDURE — 1123F ACP DISCUSS/DSCN MKR DOCD: CPT | Performed by: FAMILY MEDICINE

## 2024-04-24 PROCEDURE — 83036 HEMOGLOBIN GLYCOSYLATED A1C: CPT | Performed by: FAMILY MEDICINE

## 2024-04-24 PROCEDURE — 99214 OFFICE O/P EST MOD 30 MIN: CPT | Performed by: FAMILY MEDICINE

## 2024-04-24 PROCEDURE — 3044F HG A1C LEVEL LT 7.0%: CPT | Performed by: FAMILY MEDICINE

## 2024-04-24 PROCEDURE — 3078F DIAST BP <80 MM HG: CPT | Performed by: FAMILY MEDICINE

## 2024-04-24 PROCEDURE — 3074F SYST BP LT 130 MM HG: CPT | Performed by: FAMILY MEDICINE

## 2024-04-24 SDOH — ECONOMIC STABILITY: FOOD INSECURITY: WITHIN THE PAST 12 MONTHS, YOU WORRIED THAT YOUR FOOD WOULD RUN OUT BEFORE YOU GOT MONEY TO BUY MORE.: NEVER TRUE

## 2024-04-24 SDOH — ECONOMIC STABILITY: INCOME INSECURITY: HOW HARD IS IT FOR YOU TO PAY FOR THE VERY BASICS LIKE FOOD, HOUSING, MEDICAL CARE, AND HEATING?: NOT HARD AT ALL

## 2024-04-24 SDOH — ECONOMIC STABILITY: HOUSING INSECURITY
IN THE LAST 12 MONTHS, WAS THERE A TIME WHEN YOU DID NOT HAVE A STEADY PLACE TO SLEEP OR SLEPT IN A SHELTER (INCLUDING NOW)?: NO

## 2024-04-24 SDOH — ECONOMIC STABILITY: FOOD INSECURITY: WITHIN THE PAST 12 MONTHS, THE FOOD YOU BOUGHT JUST DIDN'T LAST AND YOU DIDN'T HAVE MONEY TO GET MORE.: NEVER TRUE

## 2024-04-24 SDOH — HEALTH STABILITY: PHYSICAL HEALTH: ON AVERAGE, HOW MANY MINUTES DO YOU ENGAGE IN EXERCISE AT THIS LEVEL?: 60 MIN

## 2024-04-24 SDOH — HEALTH STABILITY: PHYSICAL HEALTH: ON AVERAGE, HOW MANY DAYS PER WEEK DO YOU ENGAGE IN MODERATE TO STRENUOUS EXERCISE (LIKE A BRISK WALK)?: 4 DAYS

## 2024-04-24 ASSESSMENT — PATIENT HEALTH QUESTIONNAIRE - PHQ9
SUM OF ALL RESPONSES TO PHQ QUESTIONS 1-9: 0
SUM OF ALL RESPONSES TO PHQ9 QUESTIONS 1 & 2: 0
2. FEELING DOWN, DEPRESSED OR HOPELESS: NOT AT ALL
SUM OF ALL RESPONSES TO PHQ QUESTIONS 1-9: 0
SUM OF ALL RESPONSES TO PHQ QUESTIONS 1-9: 0
1. LITTLE INTEREST OR PLEASURE IN DOING THINGS: NOT AT ALL
SUM OF ALL RESPONSES TO PHQ QUESTIONS 1-9: 0

## 2024-04-24 NOTE — PROGRESS NOTES
[facility]  [facility]  [address]  [facility] [address]  Dept: [phone]    Duane T Bancroft is a 74 y.o. male Established patient, who presents today for his medical conditions/complaintsas noted below.      Chief Complaint   Patient presents with    Diabetes     Pt is here for a 3 Month f/u. Last A1c was 7.4.       HPI:     HPI  A1C  Home sugars  135-145  No low sugar reactions  Works 4 days a week, for [employer] 4 hrs per shift. Loads and unloads trucks for them.     Reviewed prior notes None  Reviewed previous Labs      LDL Calculated (mg/dL)   Date Value   01/06/2024 72   11/25/2022 96   07/19/2021 80       (goal LDL is <100)   BUN (mg/dL)   Date Value   01/25/2023 10     Hemoglobin A1C (%)   Date Value   12/22/2023 7.4     BP Readings from Last 3 Encounters:   04/24/24 110/60   12/22/23 (!) 120/58   08/17/23 136/62          (goal 120/80)    Past Medical History:   Diagnosis Date    Aneurysm (HCC)     rt superior hypophyseal    Diabetes mellitus (HCC)     H/O hepatitis     Hyperlipidemia     Hypertension     Sinus infection     Wellness examination     PCP Dr Roma Ramos; last visit 11/14/22      Past Surgical History:   Procedure Laterality Date    CEREBRAL ANGIOGRAM      CEREBRAL ANGIOGRAM Bilateral 01/24/2023    CAROTID CEREBRAL BILATERAL    CEREBRAL ANGIOGRAM N/A 07/10/2023    DR. NUNEZ    EYE SURGERY Bilateral 01/01/2011    cataract    TONSILLECTOMY  1976       Family History   Problem Relation Age of Onset    Cancer Mother         breast cancer    Diabetes Mother     Other Mother         renal disease    Diabetes Father     Diabetes Sister     Diabetes Brother     Kidney stones Brother     Diabetes Maternal Aunt        Social History     Tobacco Use    Smoking status: Former     Current packs/day: 0.00     Average packs/day: 0.1 packs/day for 1 year (0.1 ttl pk-yrs)     Types: Cigarettes     Start date: 1/1/1980     Quit date: 1/1/1981     Years

## 2024-05-20 LAB
CREATININE URINE: 237.63 MG/DL
MICROALBUMIN/CREAT 24H UR: 1.7 MG/G{CREAT}
PROSTATE SPECIFIC ANTIGEN: 1.04 NG/ML

## 2024-05-22 DIAGNOSIS — E11.9 TYPE 2 DIABETES MELLITUS WITHOUT COMPLICATION, WITHOUT LONG-TERM CURRENT USE OF INSULIN (HCC): ICD-10-CM

## 2024-05-24 DIAGNOSIS — E11.9 TYPE 2 DIABETES MELLITUS WITHOUT COMPLICATION, UNSPECIFIED WHETHER LONG TERM INSULIN USE (HCC): ICD-10-CM

## 2024-05-30 ENCOUNTER — OFFICE VISIT (OUTPATIENT)
Dept: NEUROLOGY | Age: 74
End: 2024-05-30
Payer: COMMERCIAL

## 2024-05-30 VITALS
HEART RATE: 85 BPM | SYSTOLIC BLOOD PRESSURE: 144 MMHG | BODY MASS INDEX: 29.32 KG/M2 | WEIGHT: 176 LBS | HEIGHT: 65 IN | DIASTOLIC BLOOD PRESSURE: 71 MMHG

## 2024-05-30 DIAGNOSIS — I67.1 RIGHT INTERNAL CAROTID ARTERY ANEURYSM: Primary | ICD-10-CM

## 2024-05-30 DIAGNOSIS — I65.22 ASYMPTOMATIC STENOSIS OF LEFT CAROTID ARTERY: ICD-10-CM

## 2024-05-30 PROCEDURE — 3078F DIAST BP <80 MM HG: CPT | Performed by: PSYCHIATRY & NEUROLOGY

## 2024-05-30 PROCEDURE — 3077F SYST BP >= 140 MM HG: CPT | Performed by: PSYCHIATRY & NEUROLOGY

## 2024-05-30 PROCEDURE — 99215 OFFICE O/P EST HI 40 MIN: CPT | Performed by: PSYCHIATRY & NEUROLOGY

## 2024-05-30 PROCEDURE — 1123F ACP DISCUSS/DSCN MKR DOCD: CPT | Performed by: PSYCHIATRY & NEUROLOGY

## 2024-05-30 ASSESSMENT — ENCOUNTER SYMPTOMS
COLOR CHANGE: 0
NAUSEA: 0
PHOTOPHOBIA: 0
SHORTNESS OF BREATH: 0
VOMITING: 0
COUGH: 0
VOICE CHANGE: 0

## 2024-05-30 NOTE — PROGRESS NOTES
Endovascular Neurosurgery - [place] [address]  [place] [address]  P: [phone]  F: [phone]      Dear Dr. Ramos    HPI:     HPI Duane T Bancroft is a 74 y.o. male with a history of DM, htn and right ica superior hypophyseal artery aneurysm s/p Jan 24, 2023 Surpass Evolve flow diversion. Doing well on aspirin.  Last DSA 7- with small aneurysm residual filling.  No further imaging since that visit.       No new vision changes, headache.noted dry eyes requiring droops.     Allergies   Allergen Reactions    Cialis [Tadalafil]      Leg pain/cramps    Ranitidine      Leg spasms     Current Outpatient Medications   Medication Sig Dispense Refill    metFORMIN (GLUCOPHAGE) 500 MG tablet Take 1 tablet by mouth once daily with breakfast 90 tablet 2    simvastatin (ZOCOR) 80 MG tablet Take 1 tablet by mouth nightly 90 tablet 0    blood glucose test strips (TRUE METRIX BLOOD GLUCOSE TEST) strip USE 1 STRIP TO CHECK GLUCOSE ONCE DAILY AS DIRECTED 50 each 3    naproxen (NAPROSYN) 500 MG tablet Take 1 tablet by mouth twice daily as needed for pain 60 tablet 0    amLODIPine-benazepril (LOTREL) 5-10 MG per capsule Take 1 capsule by mouth once daily 90 capsule 1    TRUEplus Lancets 28G MISC USE 1 LANCET TO CHECK GLUCOSE ONCE DAILY 100 each 2    tamsulosin (FLOMAX) 0.4 MG capsule Take 1 capsule by mouth daily 90 capsule 3    aspirin 81 MG chewable tablet Take 1 tablet by mouth daily 30 tablet 3    TRUEplus Lancets 30G MISC USE AS DIRECTED ONCE DAILY 100 each 3    Multiple Vitamin (MULTI VITAMIN DAILY) TABS Take 1 tablet by mouth daily       No current facility-administered medications for this visit.     Past Medical History:   Diagnosis Date    Aneurysm (HCC)     rt superior hypophyseal    Diabetes mellitus (HCC)     H/O hepatitis     Hyperlipidemia     Hypertension     Sinus infection     Wellness examination     PCP Dr Roma Ramos; last visit 11/14/22      Past Surgical History:

## 2024-06-06 DIAGNOSIS — E11.21 TYPE 2 DIABETES WITH NEPHROPATHY (HCC): ICD-10-CM

## 2024-06-06 RX ORDER — GLUCOSAM/CHON-MSM1/C/MANG/BOSW 500-416.6
TABLET ORAL
Qty: 100 EACH | Refills: 1 | Status: SHIPPED | OUTPATIENT
Start: 2024-06-06

## 2024-06-06 NOTE — TELEPHONE ENCOUNTER
LAST VISIT:   4/24/2024     Future Appointments   Date Time Provider Department Center   6/24/2024  8:30 AM Caesar Menrad MD [facility]   9/25/2024  1:20 PM Roma Ramos MD [facility]   5/29/2025  2:30 PM Matt Rice MD Neuro Endo [facility]       Pharmacy verified? Yes    [facility] [address] [place] [address] -  [phone] - F [phone]  [address] [place] [address]  Phone: [phone] Fax: [phone]

## 2024-06-12 DIAGNOSIS — E11.21 TYPE 2 DIABETES WITH NEPHROPATHY (HCC): ICD-10-CM

## 2024-06-12 RX ORDER — GLUCOSAM/CHON-MSM1/C/MANG/BOSW 500-416.6
TABLET ORAL
Qty: 100 EACH | Refills: 1 | Status: SHIPPED | OUTPATIENT
Start: 2024-06-12

## 2024-06-12 NOTE — TELEPHONE ENCOUNTER
LAST VISIT:   4/24/2024     Future Appointments   Date Time Provider Department Center   6/24/2024  8:30 AM Caesar Menard MD [facility]   9/25/2024  1:20 PM Roma Ramos MD [facility]   5/29/2025  2:30 PM Matt Rice MD Neuro Endo [facility]

## 2024-06-17 DIAGNOSIS — Z12.5 PROSTATE CANCER SCREENING: ICD-10-CM

## 2024-06-24 ENCOUNTER — OFFICE VISIT (OUTPATIENT)
Dept: UROLOGY | Age: 74
End: 2024-06-24
Payer: COMMERCIAL

## 2024-06-24 VITALS
OXYGEN SATURATION: 98 % | WEIGHT: 176 LBS | BODY MASS INDEX: 29.32 KG/M2 | TEMPERATURE: 98.1 F | DIASTOLIC BLOOD PRESSURE: 72 MMHG | SYSTOLIC BLOOD PRESSURE: 130 MMHG | HEART RATE: 86 BPM | HEIGHT: 65 IN

## 2024-06-24 DIAGNOSIS — R35.1 NOCTURIA: ICD-10-CM

## 2024-06-24 DIAGNOSIS — N40.1 HYPERTROPHY OF PROSTATE WITH URINARY OBSTRUCTION: Primary | ICD-10-CM

## 2024-06-24 DIAGNOSIS — R35.0 FREQUENCY OF URINATION: ICD-10-CM

## 2024-06-24 DIAGNOSIS — N52.01 ERECTILE DYSFUNCTION DUE TO ARTERIAL INSUFFICIENCY: ICD-10-CM

## 2024-06-24 DIAGNOSIS — R39.12 WEAK URINARY STREAM: ICD-10-CM

## 2024-06-24 DIAGNOSIS — N13.8 HYPERTROPHY OF PROSTATE WITH URINARY OBSTRUCTION: Primary | ICD-10-CM

## 2024-06-24 DIAGNOSIS — Z12.5 PROSTATE CANCER SCREENING: ICD-10-CM

## 2024-06-24 PROCEDURE — 3075F SYST BP GE 130 - 139MM HG: CPT | Performed by: UROLOGY

## 2024-06-24 PROCEDURE — 1123F ACP DISCUSS/DSCN MKR DOCD: CPT | Performed by: UROLOGY

## 2024-06-24 PROCEDURE — 3078F DIAST BP <80 MM HG: CPT | Performed by: UROLOGY

## 2024-06-24 PROCEDURE — 99214 OFFICE O/P EST MOD 30 MIN: CPT | Performed by: UROLOGY

## 2024-06-24 RX ORDER — SILDENAFIL 100 MG/1
100 TABLET, FILM COATED ORAL DAILY PRN
Qty: 30 TABLET | Refills: 1 | Status: SHIPPED | OUTPATIENT
Start: 2024-06-24

## 2024-06-24 RX ORDER — TAMSULOSIN HYDROCHLORIDE 0.4 MG/1
0.4 CAPSULE ORAL DAILY
Qty: 90 CAPSULE | Refills: 3 | Status: SHIPPED | OUTPATIENT
Start: 2024-06-24

## 2024-06-24 ASSESSMENT — ENCOUNTER SYMPTOMS
SHORTNESS OF BREATH: 0
COLOR CHANGE: 0
NAUSEA: 0
ALLERGIC/IMMUNOLOGIC NEGATIVE: 1
RESPIRATORY NEGATIVE: 1
VOMITING: 0
EYE REDNESS: 0
EYES NEGATIVE: 1
ABDOMINAL PAIN: 0
GASTROINTESTINAL NEGATIVE: 1
WHEEZING: 0
EYE PAIN: 0
COUGH: 0
BACK PAIN: 0

## 2024-06-24 NOTE — PROGRESS NOTES
Review of Systems   Constitutional: Negative.  Negative for appetite change, chills and fever.   HENT: Negative.     Eyes: Negative.  Negative for pain, redness and visual disturbance.   Respiratory: Negative.  Negative for cough, shortness of breath and wheezing.    Cardiovascular: Negative.  Negative for chest pain and leg swelling.   Gastrointestinal: Negative.  Negative for abdominal pain, nausea and vomiting.   Endocrine: Negative.    Genitourinary: Negative.  Negative for difficulty urinating, dysuria, flank pain, frequency, hematuria, testicular pain and urgency.   Musculoskeletal: Negative.  Negative for back pain, joint swelling and myalgias.   Skin: Negative.  Negative for color change, rash and wound.   Allergic/Immunologic: Negative.    Neurological: Negative.  Negative for dizziness, tremors, weakness, numbness and headaches.   Hematological: Negative.  Negative for adenopathy. Does not bruise/bleed easily.   Psychiatric/Behavioral: Negative.       
cataract    TONSILLECTOMY       Family History   Problem Relation Age of Onset    Cancer Mother         breast cancer    Diabetes Mother     Other Mother         renal disease    Diabetes Father     Diabetes Sister     Diabetes Brother     Kidney stones Brother     Diabetes Maternal Aunt      Outpatient Medications Marked as Taking for the 24 encounter (Office Visit) with Caesar Menard MD   Medication Sig Dispense Refill    tamsulosin (FLOMAX) 0.4 MG capsule Take 1 capsule by mouth daily 90 capsule 3    sildenafil (VIAGRA) 100 MG tablet Take 1 tablet by mouth daily as needed for Erectile Dysfunction 30 tablet 1    TRUEplus Lancets 28G MISC USE 1  TO CHECK GLUCOSE ONCE DAILY 100 each 1    metFORMIN (GLUCOPHAGE) 500 MG tablet Take 1 tablet by mouth once daily with breakfast 90 tablet 2    simvastatin (ZOCOR) 80 MG tablet Take 1 tablet by mouth nightly 90 tablet 0    blood glucose test strips (TRUE METRIX BLOOD GLUCOSE TEST) strip USE 1 STRIP TO CHECK GLUCOSE ONCE DAILY AS DIRECTED 50 each 3    naproxen (NAPROSYN) 500 MG tablet Take 1 tablet by mouth twice daily as needed for pain 60 tablet 0    amLODIPine-benazepril (LOTREL) 5-10 MG per capsule Take 1 capsule by mouth once daily 90 capsule 1    aspirin 81 MG chewable tablet Take 1 tablet by mouth daily 30 tablet 3    TRUEplus Lancets 30G MISC USE AS DIRECTED ONCE DAILY 100 each 3    Multiple Vitamin (MULTI VITAMIN DAILY) TABS Take 1 tablet by mouth daily         Cialis [tadalafil] and Ranitidine  Social History     Tobacco Use   Smoking Status Former    Current packs/day: 0.00    Average packs/day: 0.1 packs/day for 1 year (0.1 ttl pk-yrs)    Types: Cigarettes    Start date: 1980    Quit date: 1981    Years since quittin.5   Smokeless Tobacco Never     (Ifpatient a smoker, smoking cessation counseling offered)    Social History     Substance and Sexual Activity   Alcohol Use No    Alcohol/week: 0.0 standard drinks of alcohol       REVIEW OF

## 2024-07-13 DIAGNOSIS — E11.9 TYPE 2 DIABETES MELLITUS WITHOUT COMPLICATION, WITHOUT LONG-TERM CURRENT USE OF INSULIN (HCC): ICD-10-CM

## 2024-07-13 DIAGNOSIS — E78.49 OTHER HYPERLIPIDEMIA: ICD-10-CM

## 2024-07-15 DIAGNOSIS — I10 ESSENTIAL HYPERTENSION: ICD-10-CM

## 2024-07-15 RX ORDER — AMLODIPINE BESYLATE AND BENAZEPRIL HYDROCHLORIDE 5; 10 MG/1; MG/1
1 CAPSULE ORAL DAILY
Qty: 90 CAPSULE | Refills: 0 | Status: SHIPPED | OUTPATIENT
Start: 2024-07-15

## 2024-07-15 RX ORDER — SIMVASTATIN 80 MG
80 TABLET ORAL NIGHTLY
Qty: 90 TABLET | Refills: 0 | Status: SHIPPED | OUTPATIENT
Start: 2024-07-15

## 2024-07-15 NOTE — TELEPHONE ENCOUNTER
LAST VISIT:   4/24/2024     Future Appointments   Date Time Provider Department Center   9/25/2024  1:20 PM Roma Ramos MD [facility]   5/29/2025  2:30 PM [facility]

## 2024-09-22 DIAGNOSIS — E11.9 TYPE 2 DIABETES MELLITUS WITHOUT COMPLICATION (HCC): ICD-10-CM

## 2024-09-23 RX ORDER — CALCIUM CITRATE/VITAMIN D3 200MG-6.25
TABLET ORAL
Qty: 50 EACH | Refills: 2 | Status: SHIPPED | OUTPATIENT
Start: 2024-09-23

## 2024-09-25 ENCOUNTER — OFFICE VISIT (OUTPATIENT)
Dept: PRIMARY CARE CLINIC | Age: 74
End: 2024-09-25
Payer: COMMERCIAL

## 2024-09-25 VITALS
WEIGHT: 172.8 LBS | HEART RATE: 94 BPM | OXYGEN SATURATION: 96 % | HEIGHT: 65 IN | BODY MASS INDEX: 28.79 KG/M2 | SYSTOLIC BLOOD PRESSURE: 122 MMHG | DIASTOLIC BLOOD PRESSURE: 70 MMHG

## 2024-09-25 DIAGNOSIS — I10 ESSENTIAL HYPERTENSION: ICD-10-CM

## 2024-09-25 DIAGNOSIS — E11.21 TYPE 2 DIABETES WITH NEPHROPATHY (HCC): Primary | ICD-10-CM

## 2024-09-25 LAB — HBA1C MFR BLD: 7.2 %

## 2024-09-25 PROCEDURE — 3078F DIAST BP <80 MM HG: CPT | Performed by: FAMILY MEDICINE

## 2024-09-25 PROCEDURE — 3074F SYST BP LT 130 MM HG: CPT | Performed by: FAMILY MEDICINE

## 2024-09-25 PROCEDURE — 99214 OFFICE O/P EST MOD 30 MIN: CPT | Performed by: FAMILY MEDICINE

## 2024-09-25 PROCEDURE — 83036 HEMOGLOBIN GLYCOSYLATED A1C: CPT | Performed by: FAMILY MEDICINE

## 2024-09-25 PROCEDURE — 3051F HG A1C>EQUAL 7.0%<8.0%: CPT | Performed by: FAMILY MEDICINE

## 2024-09-25 PROCEDURE — 1123F ACP DISCUSS/DSCN MKR DOCD: CPT | Performed by: FAMILY MEDICINE

## 2024-09-25 ASSESSMENT — ENCOUNTER SYMPTOMS: COUGH: 1

## 2024-10-09 DIAGNOSIS — M17.10 ARTHRITIS OF KNEE: ICD-10-CM

## 2024-10-09 RX ORDER — NAPROXEN 500 MG/1
500 TABLET ORAL 2 TIMES DAILY PRN
Qty: 60 TABLET | Refills: 0 | Status: SHIPPED | OUTPATIENT
Start: 2024-10-09

## 2024-10-11 DIAGNOSIS — E78.49 OTHER HYPERLIPIDEMIA: ICD-10-CM

## 2024-10-11 DIAGNOSIS — E11.9 TYPE 2 DIABETES MELLITUS WITHOUT COMPLICATION, WITHOUT LONG-TERM CURRENT USE OF INSULIN (HCC): ICD-10-CM

## 2024-10-11 RX ORDER — SIMVASTATIN 80 MG
80 TABLET ORAL NIGHTLY
Qty: 90 TABLET | Refills: 1 | Status: SHIPPED | OUTPATIENT
Start: 2024-10-11

## 2024-10-23 DIAGNOSIS — I10 ESSENTIAL HYPERTENSION: ICD-10-CM

## 2024-10-23 RX ORDER — AMLODIPINE AND BENAZEPRIL HYDROCHLORIDE 5; 10 MG/1; MG/1
1 CAPSULE ORAL DAILY
Qty: 90 CAPSULE | Refills: 1 | Status: SHIPPED | OUTPATIENT
Start: 2024-10-23

## 2025-01-01 DIAGNOSIS — E11.21 TYPE 2 DIABETES WITH NEPHROPATHY (HCC): ICD-10-CM

## 2025-01-02 RX ORDER — GLUCOSAM/CHON-MSM1/C/MANG/BOSW 500-416.6
TABLET ORAL
Qty: 100 EACH | Refills: 0 | Status: SHIPPED | OUTPATIENT
Start: 2025-01-02

## 2025-01-30 ENCOUNTER — TELEPHONE (OUTPATIENT)
Dept: PRIMARY CARE CLINIC | Age: 75
End: 2025-01-30

## 2025-01-30 DIAGNOSIS — E11.21 TYPE 2 DIABETES WITH NEPHROPATHY (HCC): Primary | ICD-10-CM

## 2025-01-30 RX ORDER — BLOOD-GLUCOSE METER
1 EACH MISCELLANEOUS DAILY
Qty: 1 KIT | Refills: 0 | Status: SHIPPED | OUTPATIENT
Start: 2025-01-30

## 2025-01-30 NOTE — TELEPHONE ENCOUNTER
True Metrix testing supplies no longer are covered by patient's insurance.     Patients wife was told thefollowing are covered:  OneTouch, True-Track, or ReliOn.    If ok, please send new prescriptions for these items to [facility] in [place].

## 2025-01-31 ENCOUNTER — TELEPHONE (OUTPATIENT)
Dept: PRIMARY CARE CLINIC | Age: 75
End: 2025-01-31

## 2025-01-31 DIAGNOSIS — E11.21 TYPE 2 DIABETES WITH NEPHROPATHY (HCC): ICD-10-CM

## 2025-01-31 RX ORDER — LANCETS 30 GAUGE
1 EACH MISCELLANEOUS DAILY
Qty: 100 EACH | Refills: 5 | Status: SHIPPED | OUTPATIENT
Start: 2025-01-31

## 2025-01-31 NOTE — TELEPHONE ENCOUNTER
Needs lancets and test strips for OneTouch Ultra 2.    The have gotten the glucometers, however no additional testing strips were ordered, per patient's wife.    Pharmacy verified.

## 2025-02-16 DIAGNOSIS — E11.9 TYPE 2 DIABETES MELLITUS WITHOUT COMPLICATION, UNSPECIFIED WHETHER LONG TERM INSULIN USE (HCC): ICD-10-CM

## 2025-02-20 ASSESSMENT — ENCOUNTER SYMPTOMS
SORE THROAT: 1
COUGH: 1

## 2025-02-20 NOTE — PROGRESS NOTES
[facility]  [address]  [facility] [address]  Phone: [phone]  Fax: [phone]    Duane T Bancroft is a 75 y.o. male who presents today for his medical conditions/complaintsas noted below.  Chief Complaint   Patient presents with    Cough     Patient c/o sx started 2/18.     Pharyngitis        HPI:     HPI  Started 2/18/25  Taking Coricidan HBP  Sugars are slightly high with this  Coughing a lot, but not SOB or wheezing  No fever    Current Outpatient Medications   Medication Sig Dispense Refill    oseltamivir (TAMIFLU) 75 MG capsule Take 1 capsule by mouth 2 times daily for 5 days 10 capsule 0    benzonatate (TESSALON) 100 MG capsule Take 1-2 capsules by mouth 3 times daily as needed for Cough 42 capsule 0    metFORMIN (GLUCOPHAGE) 500 MG tablet Take 1 tablet by mouth once daily with breakfast 90 tablet 0    blood glucose test strips (ASCENSIA AUTODISC VI;ONE TOUCH ULTRA TEST VI) strip Use with associated glucose meter. Test daily, not on insulin type 2  strip 11    Lancets MISC 1 each by Does not apply route daily 100 each 5    Blood Glucose Monitoring Suppl (ONE TOUCH ULTRA 2) w/Device KIT 1 kit by Does not apply route daily Test daily Type 2 DM not on insuln 1 kit 0    TRUEplus Lancets 28G MISC USE 1  TO CHECK GLUCOSE ONCE DAILY 100 each 0    amLODIPine-benazepril (LOTREL) 5-10 MG per capsule Take 1 capsule by mouth once daily 90 capsule 1    simvastatin (ZOCOR) 80 MG tablet Take 1 tablet by mouth nightly 90 tablet 1    naproxen (NAPROSYN) 500 MG tablet Take 1 tablet by mouth 2 times daily as needed for Pain 60 tablet 0    tamsulosin (FLOMAX) 0.4 MG capsule Take 1 capsule by mouth daily 90 capsule 3    sildenafil (VIAGRA) 100 MG tablet Take 1 tablet by mouth daily as needed for Erectile Dysfunction 30 tablet 1    aspirin 81 MG chewable tablet Take 1 tablet by mouth daily 30 tablet 3    TRUEplus Lancets 30G MISC USE AS DIRECTED ONCE DAILY 100 each 3

## 2025-02-21 ENCOUNTER — OFFICE VISIT (OUTPATIENT)
Dept: PRIMARY CARE CLINIC | Age: 75
End: 2025-02-21

## 2025-02-21 VITALS
WEIGHT: 166.8 LBS | HEART RATE: 98 BPM | TEMPERATURE: 99 F | DIASTOLIC BLOOD PRESSURE: 70 MMHG | HEIGHT: 65 IN | OXYGEN SATURATION: 96 % | SYSTOLIC BLOOD PRESSURE: 140 MMHG | BODY MASS INDEX: 27.79 KG/M2

## 2025-02-21 DIAGNOSIS — R05.1 ACUTE COUGH: ICD-10-CM

## 2025-02-21 DIAGNOSIS — E11.21 TYPE 2 DIABETES WITH NEPHROPATHY (HCC): ICD-10-CM

## 2025-02-21 DIAGNOSIS — J10.1 INFLUENZA A: Primary | ICD-10-CM

## 2025-02-21 LAB
INFLUENZA A ANTIGEN, POC: POSITIVE
INFLUENZA B ANTIGEN, POC: NEGATIVE
Lab: ABNORMAL
QC PASS/FAIL: ABNORMAL
SARS-COV-2 RDRP RESP QL NAA+PROBE: NEGATIVE
VALID INTERNAL CONTROL, POC: YES

## 2025-02-21 RX ORDER — BENZONATATE 100 MG/1
100-200 CAPSULE ORAL 3 TIMES DAILY PRN
Qty: 42 CAPSULE | Refills: 0 | Status: SHIPPED | OUTPATIENT
Start: 2025-02-21 | End: 2025-02-28

## 2025-02-21 RX ORDER — OSELTAMIVIR PHOSPHATE 75 MG/1
75 CAPSULE ORAL 2 TIMES DAILY
Qty: 10 CAPSULE | Refills: 0 | Status: SHIPPED | OUTPATIENT
Start: 2025-02-21 | End: 2025-02-26

## 2025-02-21 SDOH — ECONOMIC STABILITY: FOOD INSECURITY: WITHIN THE PAST 12 MONTHS, YOU WORRIED THAT YOUR FOOD WOULD RUN OUT BEFORE YOU GOT MONEY TO BUY MORE.: NEVER TRUE

## 2025-02-21 SDOH — ECONOMIC STABILITY: FOOD INSECURITY: WITHIN THE PAST 12 MONTHS, THE FOOD YOU BOUGHT JUST DIDN'T LAST AND YOU DIDN'T HAVE MONEY TO GET MORE.: NEVER TRUE

## 2025-02-21 ASSESSMENT — PATIENT HEALTH QUESTIONNAIRE - PHQ9
2. FEELING DOWN, DEPRESSED OR HOPELESS: NOT AT ALL
SUM OF ALL RESPONSES TO PHQ QUESTIONS 1-9: 0
1. LITTLE INTEREST OR PLEASURE IN DOING THINGS: NOT AT ALL
SUM OF ALL RESPONSES TO PHQ QUESTIONS 1-9: 0
SUM OF ALL RESPONSES TO PHQ9 QUESTIONS 1 & 2: 0
SUM OF ALL RESPONSES TO PHQ QUESTIONS 1-9: 0
SUM OF ALL RESPONSES TO PHQ QUESTIONS 1-9: 0

## 2025-02-21 ASSESSMENT — ENCOUNTER SYMPTOMS
WHEEZING: 0
VOMITING: 0
NAUSEA: 0
SHORTNESS OF BREATH: 0
RHINORRHEA: 1
DIARRHEA: 0

## 2025-03-05 ENCOUNTER — OFFICE VISIT (OUTPATIENT)
Dept: PRIMARY CARE CLINIC | Age: 75
End: 2025-03-05
Payer: COMMERCIAL

## 2025-03-05 VITALS
DIASTOLIC BLOOD PRESSURE: 60 MMHG | HEART RATE: 87 BPM | SYSTOLIC BLOOD PRESSURE: 130 MMHG | BODY MASS INDEX: 28.56 KG/M2 | HEIGHT: 65 IN | WEIGHT: 171.4 LBS | OXYGEN SATURATION: 94 %

## 2025-03-05 DIAGNOSIS — E78.49 OTHER HYPERLIPIDEMIA: ICD-10-CM

## 2025-03-05 DIAGNOSIS — I10 ESSENTIAL HYPERTENSION: ICD-10-CM

## 2025-03-05 DIAGNOSIS — E11.21 TYPE 2 DIABETES WITH NEPHROPATHY (HCC): Primary | ICD-10-CM

## 2025-03-05 LAB — HBA1C MFR BLD: 7.4 %

## 2025-03-05 PROCEDURE — 99214 OFFICE O/P EST MOD 30 MIN: CPT | Performed by: FAMILY MEDICINE

## 2025-03-05 PROCEDURE — 3075F SYST BP GE 130 - 139MM HG: CPT | Performed by: FAMILY MEDICINE

## 2025-03-05 PROCEDURE — 1159F MED LIST DOCD IN RCRD: CPT | Performed by: FAMILY MEDICINE

## 2025-03-05 PROCEDURE — 3051F HG A1C>EQUAL 7.0%<8.0%: CPT | Performed by: FAMILY MEDICINE

## 2025-03-05 PROCEDURE — 83036 HEMOGLOBIN GLYCOSYLATED A1C: CPT | Performed by: FAMILY MEDICINE

## 2025-03-05 PROCEDURE — 3078F DIAST BP <80 MM HG: CPT | Performed by: FAMILY MEDICINE

## 2025-03-05 PROCEDURE — 1123F ACP DISCUSS/DSCN MKR DOCD: CPT | Performed by: FAMILY MEDICINE

## 2025-03-05 RX ORDER — BENZONATATE 100 MG/1
100 CAPSULE ORAL PRN
COMMUNITY

## 2025-03-05 ASSESSMENT — ENCOUNTER SYMPTOMS
SHORTNESS OF BREATH: 0
COUGH: 1

## 2025-03-05 NOTE — PROGRESS NOTES
Neurological:      Mental Status: He is alert and oriented to person, place, and time.   Psychiatric:         Mood and Affect: Mood normal.         Behavior: Behavior normal.         Thought Content: Thought content normal.         Assessment:       Diagnosis Orders   1. Type 2 diabetes with nephropathy (HCC)        2. Essential hypertension        3. Other hyperlipidemia             Plan:   Assessment & Plan   No follow-ups on file.    No orders of the defined types were placed in this encounter.    No orders of the defined types were placed in this encounter.      Patient given educationalmaterials - see patient instructions.  Discussed use, benefit, and side effectsof prescribed medications.  All patient questions answered. Pt voiced understanding.Reviewed health maintenance.  Instructed to continue current medications, diet andexercise.  Patient agreed with treatment plan. Follow up as directed.     Electronicallysigned by Roma Ramos MD on 3/5/2025 at 1:49 PM

## 2025-03-08 DIAGNOSIS — M17.10 ARTHRITIS OF KNEE: ICD-10-CM

## 2025-03-10 RX ORDER — NAPROXEN 500 MG/1
500 TABLET ORAL 2 TIMES DAILY PRN
Qty: 60 TABLET | Refills: 0 | Status: SHIPPED | OUTPATIENT
Start: 2025-03-10

## 2025-03-15 LAB
ALBUMIN: NORMAL
ALP BLD-CCNC: NORMAL U/L
ALT SERPL-CCNC: NORMAL U/L
AST SERPL-CCNC: NORMAL U/L
BILIRUB SERPL-MCNC: NORMAL MG/DL
BUN BLDV-MCNC: NORMAL MG/DL
CALCIUM SERPL-MCNC: NORMAL MG/DL
CHLORIDE BLD-SCNC: NORMAL MMOL/L
CHOLESTEROL, TOTAL: 137 MG/DL
CHOLESTEROL/HDL RATIO: 3
CO2: NORMAL
CREAT SERPL-MCNC: NORMAL MG/DL
GFR, ESTIMATED: NORMAL
GLUCOSE FASTING: 116 MG/DL
HDLC SERPL-MCNC: 45 MG/DL (ref 35–70)
LDL CHOLESTEROL: 70
NONHDLC SERPL-MCNC: NORMAL MG/DL
POTASSIUM SERPL-SCNC: NORMAL MMOL/L
SODIUM BLD-SCNC: NORMAL MMOL/L
TOTAL PROTEIN: NORMAL
TRIGL SERPL-MCNC: 111 MG/DL
VLDLC SERPL CALC-MCNC: 22 MG/DL

## 2025-03-17 LAB
CREATININE URINE: 152.26 MG/DL
MICROALBUMIN/CREAT 24H UR: <0.7 MG/DL
MICROALBUMIN/CREAT UR-RTO: NORMAL

## 2025-03-24 ENCOUNTER — RESULTS FOLLOW-UP (OUTPATIENT)
Dept: PRIMARY CARE CLINIC | Age: 75
End: 2025-03-24

## 2025-03-24 DIAGNOSIS — E78.49 OTHER HYPERLIPIDEMIA: ICD-10-CM

## 2025-03-24 DIAGNOSIS — E11.21 TYPE 2 DIABETES WITH NEPHROPATHY (HCC): ICD-10-CM

## 2025-03-24 DIAGNOSIS — I10 ESSENTIAL HYPERTENSION: ICD-10-CM

## 2025-04-04 DIAGNOSIS — M17.10 ARTHRITIS OF KNEE: ICD-10-CM

## 2025-04-04 RX ORDER — NAPROXEN 500 MG/1
500 TABLET ORAL 2 TIMES DAILY PRN
Qty: 60 TABLET | Refills: 5 | Status: SHIPPED | OUTPATIENT
Start: 2025-04-04

## 2025-04-13 DIAGNOSIS — E11.9 TYPE 2 DIABETES MELLITUS WITHOUT COMPLICATION, WITHOUT LONG-TERM CURRENT USE OF INSULIN: ICD-10-CM

## 2025-04-13 DIAGNOSIS — E78.49 OTHER HYPERLIPIDEMIA: ICD-10-CM

## 2025-04-14 RX ORDER — SIMVASTATIN 80 MG
80 TABLET ORAL NIGHTLY
Qty: 90 TABLET | Refills: 0 | Status: SHIPPED | OUTPATIENT
Start: 2025-04-14

## 2025-04-18 DIAGNOSIS — I10 ESSENTIAL HYPERTENSION: ICD-10-CM

## 2025-04-18 RX ORDER — AMLODIPINE AND BENAZEPRIL HYDROCHLORIDE 5; 10 MG/1; MG/1
1 CAPSULE ORAL DAILY
Qty: 90 CAPSULE | Refills: 0 | Status: SHIPPED | OUTPATIENT
Start: 2025-04-18

## 2025-05-17 DIAGNOSIS — E11.9 TYPE 2 DIABETES MELLITUS WITHOUT COMPLICATION, UNSPECIFIED WHETHER LONG TERM INSULIN USE (HCC): ICD-10-CM

## 2025-05-20 DIAGNOSIS — Z12.5 PROSTATE CANCER SCREENING: Primary | ICD-10-CM

## 2025-05-29 ENCOUNTER — OFFICE VISIT (OUTPATIENT)
Dept: NEUROLOGY | Age: 75
End: 2025-05-29
Payer: COMMERCIAL

## 2025-05-29 VITALS
BODY MASS INDEX: 28.92 KG/M2 | WEIGHT: 173.6 LBS | SYSTOLIC BLOOD PRESSURE: 124 MMHG | HEART RATE: 74 BPM | DIASTOLIC BLOOD PRESSURE: 61 MMHG | HEIGHT: 65 IN

## 2025-05-29 DIAGNOSIS — I67.1 RIGHT INTERNAL CAROTID ARTERY ANEURYSM: Primary | ICD-10-CM

## 2025-05-29 PROCEDURE — 1159F MED LIST DOCD IN RCRD: CPT | Performed by: PSYCHIATRY & NEUROLOGY

## 2025-05-29 PROCEDURE — 1123F ACP DISCUSS/DSCN MKR DOCD: CPT | Performed by: PSYCHIATRY & NEUROLOGY

## 2025-05-29 PROCEDURE — 3078F DIAST BP <80 MM HG: CPT | Performed by: PSYCHIATRY & NEUROLOGY

## 2025-05-29 PROCEDURE — 3074F SYST BP LT 130 MM HG: CPT | Performed by: PSYCHIATRY & NEUROLOGY

## 2025-05-29 PROCEDURE — 99214 OFFICE O/P EST MOD 30 MIN: CPT | Performed by: PSYCHIATRY & NEUROLOGY

## 2025-05-29 NOTE — PROGRESS NOTES
Endovascular Neurosurgery - [place] [address]  [place] [address]  P: [phone]  F: [phone]      Dear Dr. Roma Ramos    HPI:     History of Present Illness  Duane T Bancroft  has a history of DM, htn and right ica superior hypophyseal artery aneurysm s/p Jan 24, 2023 Surpass Evolve flow diversion. Doing well on aspirin.     The 4- mra head  performed prior to this visit shows that the stent remains open and the treated aneurysm has reduced in size compared to the initial imaging in 2023. The stent appears stable, and there is no evidence of growth or rupture. The patient reports no stroke deficits, left-sided weakness, vision changes, or facial droop, indicating the stent is functioning well.    He has been adhering to a regimen of daily aspirin intake.    INTERVAL: Since the last visit, the patient has continued taking daily aspirin and has had no new symptoms or complications.    PAST SURGICAL HISTORY: Stent placement in 2023    MEDICATIONS  CURRENT MEDS:  Aspirin Oral Daily  PREVIOUS MEDS:  Plavix  Reason for Discontinuation: No longer needed      Allergies   Allergen Reactions    Cialis [Tadalafil]      Leg pain/cramps    Ranitidine      Leg spasms     Current Outpatient Medications   Medication Sig Dispense Refill    metFORMIN (GLUCOPHAGE) 500 MG tablet Take 1 tablet by mouth once daily with breakfast 90 tablet 0    amLODIPine-benazepril (LOTREL) 5-10 MG per capsule Take 1 capsule by mouth once daily 90 capsule 0    simvastatin (ZOCOR) 80 MG tablet Take 1 tablet by mouth nightly 90 tablet 0    naproxen (NAPROSYN) 500 MG tablet Take 1 tablet by mouth twice daily as needed for pain 60 tablet 5    benzonatate (TESSALON) 100 MG capsule Take 1 capsule by mouth as needed for Cough      blood glucose test strips (ASCENSIA AUTODISC VI;ONE TOUCH ULTRA TEST VI) strip Use with associated glucose meter. Test daily, not on insulin type 2  strip 11    Lancets MISC 1

## 2025-06-30 ENCOUNTER — OFFICE VISIT (OUTPATIENT)
Dept: UROLOGY | Age: 75
End: 2025-06-30
Payer: COMMERCIAL

## 2025-06-30 VITALS — SYSTOLIC BLOOD PRESSURE: 138 MMHG | HEART RATE: 80 BPM | TEMPERATURE: 97.8 F | DIASTOLIC BLOOD PRESSURE: 48 MMHG

## 2025-06-30 DIAGNOSIS — N13.8 HYPERTROPHY OF PROSTATE WITH URINARY OBSTRUCTION: Primary | ICD-10-CM

## 2025-06-30 DIAGNOSIS — N40.1 HYPERTROPHY OF PROSTATE WITH URINARY OBSTRUCTION: Primary | ICD-10-CM

## 2025-06-30 DIAGNOSIS — N52.01 ERECTILE DYSFUNCTION DUE TO ARTERIAL INSUFFICIENCY: ICD-10-CM

## 2025-06-30 DIAGNOSIS — R35.1 NOCTURIA: ICD-10-CM

## 2025-06-30 DIAGNOSIS — R39.12 WEAK URINARY STREAM: ICD-10-CM

## 2025-06-30 DIAGNOSIS — R35.0 FREQUENCY OF URINATION: ICD-10-CM

## 2025-06-30 DIAGNOSIS — Z12.5 PROSTATE CANCER SCREENING: ICD-10-CM

## 2025-06-30 PROCEDURE — 3075F SYST BP GE 130 - 139MM HG: CPT | Performed by: UROLOGY

## 2025-06-30 PROCEDURE — 1123F ACP DISCUSS/DSCN MKR DOCD: CPT | Performed by: UROLOGY

## 2025-06-30 PROCEDURE — 1159F MED LIST DOCD IN RCRD: CPT | Performed by: UROLOGY

## 2025-06-30 PROCEDURE — 3078F DIAST BP <80 MM HG: CPT | Performed by: UROLOGY

## 2025-06-30 PROCEDURE — 99213 OFFICE O/P EST LOW 20 MIN: CPT | Performed by: UROLOGY

## 2025-06-30 RX ORDER — TAMSULOSIN HYDROCHLORIDE 0.4 MG/1
0.4 CAPSULE ORAL DAILY
Qty: 90 CAPSULE | Refills: 3 | Status: SHIPPED | OUTPATIENT
Start: 2025-06-30

## 2025-06-30 ASSESSMENT — ENCOUNTER SYMPTOMS
BACK PAIN: 0
ABDOMINAL PAIN: 0
SHORTNESS OF BREATH: 0

## 2025-06-30 NOTE — PROGRESS NOTES
[facility]  [address]  [facility] [address]  Dept: [phone]    [facility] Office Note - Established    Patient:  Duane T Bancroft  YOB: 1950  Date: 6/30/2025    The patient is a 75 y.o. male who presents todayfor evaluation of the following problems:   Chief Complaint   Patient presents with    Follow-up     1 year with PSA         HPI  This is a very pleasant 75-year-old gentleman who has a history of BPH.  He does continue to take tamsulosin with good results.  His recent PSA is 0.74.  He does also have erectile dysfunction for which we prescribed sildenafil last year. He hasn't had an opportunity to try it yet but wishes to try.    Summary of old records: N/A    Additional History: N/A    Procedures Today: N/A    Urinalysis today:  No results found for this visit on 06/30/25.  Last several PSA's:  Lab Results   Component Value Date    PSA 1.04 05/20/2024     Last total testosterone:  No results found for: \"TESTOSTERONE\"    AUA Symptom Score (6/30/2025):                               Last BUN and creatinine:  Lab Results   Component Value Date    BUN 10 01/25/2023     Lab Results   Component Value Date    CREATININE 0.54 07/10/2023       Additional Lab/Culture results: none    Imaging Reviewed during this Office Visit: none  (results were independently reviewed by physician and radiology report verified)    PAST MEDICAL, FAMILY AND SOCIAL HISTORY UPDATE:  Past Medical History:   Diagnosis Date    Aneurysm     rt superior hypophyseal    Diabetes mellitus (HCC)     H/O hepatitis     Hyperlipidemia     Hypertension     Sinus infection     Wellness examination     PCP Dr Roma Ramos; last visit 11/14/22     Past Surgical History:   Procedure Laterality Date    CEREBRAL ANGIOGRAM      CEREBRAL ANGIOGRAM Bilateral 01/24/2023    CAROTID CEREBRAL BILATERAL    CEREBRAL ANGIOGRAM N/A 07/10/2023    DR. NUNEZ

## 2025-07-07 ENCOUNTER — OFFICE VISIT (OUTPATIENT)
Dept: PRIMARY CARE CLINIC | Age: 75
End: 2025-07-07
Payer: COMMERCIAL

## 2025-07-07 ENCOUNTER — HOSPITAL ENCOUNTER (OUTPATIENT)
Age: 75
Setting detail: SPECIMEN
Discharge: HOME | End: 2025-07-07

## 2025-07-07 VITALS
SYSTOLIC BLOOD PRESSURE: 138 MMHG | DIASTOLIC BLOOD PRESSURE: 60 MMHG | WEIGHT: 171 LBS | HEIGHT: 65 IN | OXYGEN SATURATION: 92 % | BODY MASS INDEX: 28.49 KG/M2 | HEART RATE: 105 BPM

## 2025-07-07 DIAGNOSIS — M17.10 ARTHRITIS OF KNEE: ICD-10-CM

## 2025-07-07 DIAGNOSIS — I10 ESSENTIAL HYPERTENSION: ICD-10-CM

## 2025-07-07 DIAGNOSIS — E11.21 TYPE 2 DIABETES WITH NEPHROPATHY (HCC): ICD-10-CM

## 2025-07-07 DIAGNOSIS — E11.9 TYPE 2 DIABETES MELLITUS WITHOUT COMPLICATION, WITHOUT LONG-TERM CURRENT USE OF INSULIN (HCC): Primary | ICD-10-CM

## 2025-07-07 DIAGNOSIS — Z13.6 SCREENING FOR CARDIOVASCULAR CONDITION: ICD-10-CM

## 2025-07-07 DIAGNOSIS — I67.1 RIGHT INTERNAL CAROTID ARTERY ANEURYSM: ICD-10-CM

## 2025-07-07 DIAGNOSIS — E11.9 TYPE 2 DIABETES MELLITUS WITHOUT COMPLICATION, UNSPECIFIED WHETHER LONG TERM INSULIN USE (HCC): ICD-10-CM

## 2025-07-07 DIAGNOSIS — Z00.00 MEDICARE ANNUAL WELLNESS VISIT, SUBSEQUENT: ICD-10-CM

## 2025-07-07 DIAGNOSIS — R30.0 DYSURIA: ICD-10-CM

## 2025-07-07 DIAGNOSIS — E78.49 OTHER HYPERLIPIDEMIA: ICD-10-CM

## 2025-07-07 LAB
BILIRUBIN, POC: NEGATIVE
BLOOD URINE, POC: NEGATIVE
CLARITY, POC: CLEAR
COLOR, POC: YELLOW
CREAT UR-MCNC: 140 MG/DL (ref 39–259)
GLUCOSE URINE, POC: NEGATIVE MG/DL
HBA1C MFR BLD: 7.1 %
KETONES, POC: NEGATIVE MG/DL
LEUKOCYTE EST, POC: NORMAL
MICROALBUMIN UR-MCNC: 20 MG/L (ref 0–20)
MICROALBUMIN/CREAT UR-RTO: 14 MCG/MG CREAT (ref 0–17)
NITRITE, POC: NEGATIVE
PH, POC: 6.5
PROTEIN, POC: NORMAL MG/DL
SPECIFIC GRAVITY, POC: 1.02
UROBILINOGEN, POC: 2 MG/DL

## 2025-07-07 PROCEDURE — 99214 OFFICE O/P EST MOD 30 MIN: CPT | Performed by: PHYSICIAN ASSISTANT

## 2025-07-07 PROCEDURE — 1159F MED LIST DOCD IN RCRD: CPT | Performed by: PHYSICIAN ASSISTANT

## 2025-07-07 PROCEDURE — 3075F SYST BP GE 130 - 139MM HG: CPT | Performed by: PHYSICIAN ASSISTANT

## 2025-07-07 PROCEDURE — G0446 INTENS BEHAVE THER CARDIO DX: HCPCS | Performed by: PHYSICIAN ASSISTANT

## 2025-07-07 PROCEDURE — G0439 PPPS, SUBSEQ VISIT: HCPCS | Performed by: PHYSICIAN ASSISTANT

## 2025-07-07 PROCEDURE — 3051F HG A1C>EQUAL 7.0%<8.0%: CPT | Performed by: PHYSICIAN ASSISTANT

## 2025-07-07 PROCEDURE — 83036 HEMOGLOBIN GLYCOSYLATED A1C: CPT | Performed by: PHYSICIAN ASSISTANT

## 2025-07-07 PROCEDURE — 3078F DIAST BP <80 MM HG: CPT | Performed by: PHYSICIAN ASSISTANT

## 2025-07-07 PROCEDURE — 81003 URINALYSIS AUTO W/O SCOPE: CPT | Performed by: PHYSICIAN ASSISTANT

## 2025-07-07 PROCEDURE — 1123F ACP DISCUSS/DSCN MKR DOCD: CPT | Performed by: PHYSICIAN ASSISTANT

## 2025-07-07 RX ORDER — AVOBENZONE, HOMOSALATE, OCTISALATE, OCTOCRYLENE 30; 40; 45; 26 MG/ML; MG/ML; MG/ML; MG/ML
1 CREAM TOPICAL DAILY
Qty: 100 EACH | Refills: 5 | Status: SHIPPED | OUTPATIENT
Start: 2025-07-07

## 2025-07-07 RX ORDER — AMLODIPINE AND BENAZEPRIL HYDROCHLORIDE 5; 10 MG/1; MG/1
1 CAPSULE ORAL DAILY
Qty: 90 CAPSULE | Refills: 2 | Status: SHIPPED | OUTPATIENT
Start: 2025-07-07

## 2025-07-07 RX ORDER — SIMVASTATIN 80 MG
80 TABLET ORAL NIGHTLY
Qty: 90 TABLET | Refills: 2 | Status: SHIPPED | OUTPATIENT
Start: 2025-07-07

## 2025-07-07 SDOH — ECONOMIC STABILITY: FOOD INSECURITY: WITHIN THE PAST 12 MONTHS, THE FOOD YOU BOUGHT JUST DIDN'T LAST AND YOU DIDN'T HAVE MONEY TO GET MORE.: NEVER TRUE

## 2025-07-07 SDOH — ECONOMIC STABILITY: FOOD INSECURITY: WITHIN THE PAST 12 MONTHS, YOU WORRIED THAT YOUR FOOD WOULD RUN OUT BEFORE YOU GOT MONEY TO BUY MORE.: NEVER TRUE

## 2025-07-07 ASSESSMENT — PATIENT HEALTH QUESTIONNAIRE - PHQ9
SUM OF ALL RESPONSES TO PHQ QUESTIONS 1-9: 0
1. LITTLE INTEREST OR PLEASURE IN DOING THINGS: NOT AT ALL
SUM OF ALL RESPONSES TO PHQ QUESTIONS 1-9: 0
2. FEELING DOWN, DEPRESSED OR HOPELESS: NOT AT ALL
DEPRESSION UNABLE TO ASSESS: FUNCTIONAL CAPACITY MOTIVATION LIMITS ACCURACY

## 2025-07-07 NOTE — PROGRESS NOTES
capsule Take 1 capsule by mouth daily Yes Caesar Menard MD   naproxen (NAPROSYN) 500 MG tablet Take 1 tablet by mouth twice daily as needed for pain Yes Adalgisa Cortez MD   blood glucose test strips (ASCENSIA AUTODISC VI;ONE TOUCH ULTRA TEST VI) strip Use with associated glucose meter. Test daily, not on insulin type 2 DM Yes Roma Ramos MD   Blood Glucose Monitoring Suppl (ONE TOUCH ULTRA 2) w/Device KIT 1 kit by Does not apply route daily Test daily Type 2 DM not on insuln Yes Roma Ramos MD   TRUEplus Lancets 28G MISC USE 1  TO CHECK GLUCOSE ONCE DAILY Yes Roma Ramos MD   sildenafil (VIAGRA) 100 MG tablet Take 1 tablet by mouth daily as needed for Erectile Dysfunction Yes Caesar Menard MD   aspirin 81 MG chewable tablet Take 1 tablet by mouth daily Yes Purnima Cho APRN - CNP   TRUEplus Lancets 30G MISC USE AS DIRECTED ONCE DAILY Yes Roma Ramos MD   Multiple Vitamin (MULTI VITAMIN DAILY) TABS Take 1 tablet by mouth daily Yes Provider, Historical, MD       CareTeam (Including outside providers/suppliers regularly involved in providing care):   Patient Care Team:  Trenton Horan PA as PCP - General (Family Medicine)  Roma Ramos MD as PCP - Empaneled Provider  Caesar Menard MD as Consulting Physician (Urology)     Recommendations for Preventive Services Due: see orders and patient instructions/AVS.  Recommended screening schedule for the next 5-10 years is provided to the patient in written form: see Patient Instructions/AVS.     Reviewed and updated this visit:  Tobacco  Allergies  Meds  Problems  Med Hx  Surg Hx  Fam Hx  Sexual   Hx      Sexual History: Patient declined to answer.           The patient (or guardian, if applicable) and other individuals in attendance with the patient were advised that Artificial Intelligence will be utilized during this visit to record and process the conversation to generate a clinical note. The patient (or guardian,

## 2025-07-08 ENCOUNTER — RESULTS FOLLOW-UP (OUTPATIENT)
Dept: PRIMARY CARE CLINIC | Age: 75
End: 2025-07-08

## 2025-07-08 DIAGNOSIS — E11.21 TYPE 2 DIABETES WITH NEPHROPATHY (HCC): Primary | ICD-10-CM

## 2025-07-08 NOTE — RESULT ENCOUNTER NOTE
The results showed microalbumin to creatinine ratio of 14.  Continue with good control blood pressure and blood sugars to prevent chronic kidney disease.  If medication is covered and patient would like we can try to add in Jardiance or Farxiga to help protect kidneys and lower blood sugar.

## 2025-07-09 RX ORDER — DAPAGLIFLOZIN 10 MG/1
10 TABLET, FILM COATED ORAL EVERY MORNING
Qty: 90 TABLET | Refills: 0 | Status: SHIPPED | OUTPATIENT
Start: 2025-07-09

## 2025-07-12 LAB
ALBUMIN: NORMAL
ALP BLD-CCNC: NORMAL U/L
ALT SERPL-CCNC: NORMAL U/L
ANION GAP SERPL CALCULATED.3IONS-SCNC: NORMAL MMOL/L
AST SERPL-CCNC: NORMAL U/L
BILIRUB SERPL-MCNC: NORMAL MG/DL
BUN BLDV-MCNC: NORMAL MG/DL
CALCIUM SERPL-MCNC: NORMAL MG/DL
CHLORIDE BLD-SCNC: NORMAL MMOL/L
CO2: NORMAL
CREAT SERPL-MCNC: NORMAL MG/DL
GFR, ESTIMATED: >90
GLUCOSE BLD-MCNC: 149 MG/DL
POTASSIUM SERPL-SCNC: NORMAL MMOL/L
SODIUM BLD-SCNC: NORMAL MMOL/L
TOTAL PROTEIN: NORMAL

## 2025-07-15 DIAGNOSIS — E11.21 TYPE 2 DIABETES WITH NEPHROPATHY (HCC): ICD-10-CM
